# Patient Record
Sex: MALE | Race: WHITE | NOT HISPANIC OR LATINO | Employment: OTHER | URBAN - METROPOLITAN AREA
[De-identification: names, ages, dates, MRNs, and addresses within clinical notes are randomized per-mention and may not be internally consistent; named-entity substitution may affect disease eponyms.]

---

## 2017-01-03 ENCOUNTER — GENERIC CONVERSION - ENCOUNTER (OUTPATIENT)
Dept: OTHER | Facility: OTHER | Age: 67
End: 2017-01-03

## 2017-01-27 ENCOUNTER — ALLSCRIPTS OFFICE VISIT (OUTPATIENT)
Dept: OTHER | Facility: OTHER | Age: 67
End: 2017-01-27

## 2017-04-11 ENCOUNTER — GENERIC CONVERSION - ENCOUNTER (OUTPATIENT)
Dept: OTHER | Facility: OTHER | Age: 67
End: 2017-04-11

## 2017-05-19 ENCOUNTER — TRANSCRIBE ORDERS (OUTPATIENT)
Dept: ADMINISTRATIVE | Facility: HOSPITAL | Age: 67
End: 2017-05-19

## 2017-05-19 ENCOUNTER — APPOINTMENT (OUTPATIENT)
Dept: LAB | Facility: MEDICAL CENTER | Age: 67
End: 2017-05-19
Payer: COMMERCIAL

## 2017-05-19 DIAGNOSIS — R53.83 FATIGUE, UNSPECIFIED TYPE: ICD-10-CM

## 2017-05-19 DIAGNOSIS — M35.3 POLYMYALGIA RHEUMATICA (HCC): ICD-10-CM

## 2017-05-19 DIAGNOSIS — M35.3 POLYMYALGIA RHEUMATICA (HCC): Primary | ICD-10-CM

## 2017-05-19 DIAGNOSIS — E55.9 UNSPECIFIED VITAMIN D DEFICIENCY: ICD-10-CM

## 2017-05-19 LAB
25(OH)D3 SERPL-MCNC: 30.5 NG/ML (ref 30–100)
ALBUMIN SERPL BCP-MCNC: 3.8 G/DL (ref 3.5–5)
ALP SERPL-CCNC: 63 U/L (ref 46–116)
ALT SERPL W P-5'-P-CCNC: 21 U/L (ref 12–78)
ANION GAP SERPL CALCULATED.3IONS-SCNC: 6 MMOL/L (ref 4–13)
AST SERPL W P-5'-P-CCNC: 19 U/L (ref 5–45)
BASOPHILS # BLD AUTO: 0.04 THOUSANDS/ΜL (ref 0–0.1)
BASOPHILS NFR BLD AUTO: 1 % (ref 0–1)
BILIRUB SERPL-MCNC: 0.38 MG/DL (ref 0.2–1)
BUN SERPL-MCNC: 12 MG/DL (ref 5–25)
CALCIUM SERPL-MCNC: 9.1 MG/DL (ref 8.3–10.1)
CHLORIDE SERPL-SCNC: 104 MMOL/L (ref 100–108)
CO2 SERPL-SCNC: 26 MMOL/L (ref 21–32)
CREAT SERPL-MCNC: 0.77 MG/DL (ref 0.6–1.3)
CRP SERPL QL: 7.3 MG/L
EOSINOPHIL # BLD AUTO: 0.18 THOUSAND/ΜL (ref 0–0.61)
EOSINOPHIL NFR BLD AUTO: 3 % (ref 0–6)
ERYTHROCYTE [DISTWIDTH] IN BLOOD BY AUTOMATED COUNT: 14.2 % (ref 11.6–15.1)
ERYTHROCYTE [SEDIMENTATION RATE] IN BLOOD: 7 MM/HOUR (ref 0–10)
GFR SERPL CREATININE-BSD FRML MDRD: >60 ML/MIN/1.73SQ M
GLUCOSE P FAST SERPL-MCNC: 87 MG/DL (ref 65–99)
HCT VFR BLD AUTO: 43.4 % (ref 36.5–49.3)
HGB BLD-MCNC: 14.9 G/DL (ref 12–17)
LYMPHOCYTES # BLD AUTO: 1.29 THOUSANDS/ΜL (ref 0.6–4.47)
LYMPHOCYTES NFR BLD AUTO: 20 % (ref 14–44)
MCH RBC QN AUTO: 30.3 PG (ref 26.8–34.3)
MCHC RBC AUTO-ENTMCNC: 34.3 G/DL (ref 31.4–37.4)
MCV RBC AUTO: 88 FL (ref 82–98)
MONOCYTES # BLD AUTO: 0.85 THOUSAND/ΜL (ref 0.17–1.22)
MONOCYTES NFR BLD AUTO: 13 % (ref 4–12)
NEUTROPHILS # BLD AUTO: 4.21 THOUSANDS/ΜL (ref 1.85–7.62)
NEUTS SEG NFR BLD AUTO: 63 % (ref 43–75)
NRBC BLD AUTO-RTO: 0 /100 WBCS
PLATELET # BLD AUTO: 230 THOUSANDS/UL (ref 149–390)
PMV BLD AUTO: 11.9 FL (ref 8.9–12.7)
POTASSIUM SERPL-SCNC: 4.2 MMOL/L (ref 3.5–5.3)
PROT SERPL-MCNC: 7.2 G/DL (ref 6.4–8.2)
RBC # BLD AUTO: 4.92 MILLION/UL (ref 3.88–5.62)
SODIUM SERPL-SCNC: 136 MMOL/L (ref 136–145)
TSH SERPL DL<=0.05 MIU/L-ACNC: 1.25 UIU/ML (ref 0.36–3.74)
WBC # BLD AUTO: 6.58 THOUSAND/UL (ref 4.31–10.16)

## 2017-05-19 PROCEDURE — 85025 COMPLETE CBC W/AUTO DIFF WBC: CPT

## 2017-05-19 PROCEDURE — 80053 COMPREHEN METABOLIC PANEL: CPT

## 2017-05-19 PROCEDURE — 85652 RBC SED RATE AUTOMATED: CPT

## 2017-05-19 PROCEDURE — 86430 RHEUMATOID FACTOR TEST QUAL: CPT

## 2017-05-19 PROCEDURE — 86140 C-REACTIVE PROTEIN: CPT

## 2017-05-19 PROCEDURE — 84443 ASSAY THYROID STIM HORMONE: CPT

## 2017-05-19 PROCEDURE — 82306 VITAMIN D 25 HYDROXY: CPT

## 2017-05-19 PROCEDURE — 36415 COLL VENOUS BLD VENIPUNCTURE: CPT

## 2017-05-19 PROCEDURE — 86200 CCP ANTIBODY: CPT

## 2017-05-22 LAB
CCP IGA+IGG SERPL IA-ACNC: 6 UNITS (ref 0–19)
RHEUMATOID FACT SER QL LA: NEGATIVE

## 2017-06-30 ENCOUNTER — GENERIC CONVERSION - ENCOUNTER (OUTPATIENT)
Dept: OTHER | Facility: OTHER | Age: 67
End: 2017-06-30

## 2017-07-20 ENCOUNTER — TRANSCRIBE ORDERS (OUTPATIENT)
Dept: ADMINISTRATIVE | Facility: HOSPITAL | Age: 67
End: 2017-07-20

## 2017-07-20 ENCOUNTER — APPOINTMENT (OUTPATIENT)
Dept: LAB | Facility: MEDICAL CENTER | Age: 67
End: 2017-07-20
Payer: COMMERCIAL

## 2017-07-20 DIAGNOSIS — M35.3 POLYMYALGIA RHEUMATICA (HCC): ICD-10-CM

## 2017-07-20 DIAGNOSIS — M35.3 POLYMYALGIA RHEUMATICA (HCC): Primary | ICD-10-CM

## 2017-07-20 LAB
CRP SERPL QL: <3 MG/L
ERYTHROCYTE [SEDIMENTATION RATE] IN BLOOD: 1 MM/HOUR (ref 0–10)

## 2017-07-20 PROCEDURE — 85652 RBC SED RATE AUTOMATED: CPT

## 2017-07-20 PROCEDURE — 36415 COLL VENOUS BLD VENIPUNCTURE: CPT

## 2017-07-20 PROCEDURE — 86140 C-REACTIVE PROTEIN: CPT

## 2017-08-10 ENCOUNTER — ALLSCRIPTS OFFICE VISIT (OUTPATIENT)
Dept: OTHER | Facility: OTHER | Age: 67
End: 2017-08-10

## 2017-09-06 ENCOUNTER — GENERIC CONVERSION - ENCOUNTER (OUTPATIENT)
Dept: OTHER | Facility: OTHER | Age: 67
End: 2017-09-06

## 2017-09-27 ENCOUNTER — TRANSCRIBE ORDERS (OUTPATIENT)
Dept: ADMINISTRATIVE | Facility: HOSPITAL | Age: 67
End: 2017-09-27

## 2017-09-27 ENCOUNTER — APPOINTMENT (OUTPATIENT)
Dept: LAB | Facility: MEDICAL CENTER | Age: 67
End: 2017-09-27
Attending: UROLOGY
Payer: COMMERCIAL

## 2017-09-27 DIAGNOSIS — N40.1 ENLARGED PROSTATE WITH URINARY OBSTRUCTION: ICD-10-CM

## 2017-09-27 DIAGNOSIS — N13.8 ENLARGED PROSTATE WITH URINARY OBSTRUCTION: Primary | ICD-10-CM

## 2017-09-27 DIAGNOSIS — N40.1 ENLARGED PROSTATE WITH URINARY OBSTRUCTION: Primary | ICD-10-CM

## 2017-09-27 DIAGNOSIS — N13.8 ENLARGED PROSTATE WITH URINARY OBSTRUCTION: ICD-10-CM

## 2017-09-27 LAB — PSA SERPL-MCNC: 2.9 NG/ML (ref 0–4)

## 2017-09-27 PROCEDURE — 84153 ASSAY OF PSA TOTAL: CPT

## 2017-09-29 ENCOUNTER — GENERIC CONVERSION - ENCOUNTER (OUTPATIENT)
Dept: OTHER | Facility: OTHER | Age: 67
End: 2017-09-29

## 2017-10-12 ENCOUNTER — ALLSCRIPTS OFFICE VISIT (OUTPATIENT)
Dept: OTHER | Facility: OTHER | Age: 67
End: 2017-10-12

## 2017-10-12 DIAGNOSIS — N40.1 ENLARGED PROSTATE WITH LOWER URINARY TRACT SYMPTOMS (LUTS): ICD-10-CM

## 2017-10-12 LAB
BILIRUB UR QL STRIP: NORMAL
CLARITY UR: NORMAL
COLOR UR: YELLOW
GLUCOSE (HISTORICAL): NORMAL
HGB UR QL STRIP.AUTO: NORMAL
KETONES UR STRIP-MCNC: NORMAL MG/DL
LEUKOCYTE ESTERASE UR QL STRIP: NORMAL
NITRITE UR QL STRIP: NORMAL
PH UR STRIP.AUTO: 5.5 [PH]
PROT UR STRIP-MCNC: NORMAL MG/DL
SP GR UR STRIP.AUTO: 1
UROBILINOGEN UR QL STRIP.AUTO: 0.2

## 2018-01-10 NOTE — RESULT NOTES
Verified Results  (1) WOUND CULTURE [id number] 04:53PM Denae Junior    Order Number: [id number]     Test Name Result Flag Reference   CLINICAL REPORT (Report)     Test:        Wound culture  Specimen Type:    Wound  Specimen Date:   7/29/2016 4:53 PM  Result Date:    7/31/2016 12:00 PM  Result Status:   Final result  Resulting Lab:   [address] [address]            Tel: [phone]      CULTURE                                       ------------------                                   2+ Growth of Mixed Skin Jazmine      STAIN                                        ------------------                                   Rare Polys    No bacteria seen
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
## 2018-01-12 VITALS
BODY MASS INDEX: 21.86 KG/M2 | HEIGHT: 66 IN | WEIGHT: 136 LBS | DIASTOLIC BLOOD PRESSURE: 82 MMHG | SYSTOLIC BLOOD PRESSURE: 124 MMHG

## 2018-01-13 VITALS
HEART RATE: 64 BPM | RESPIRATION RATE: 18 BRPM | DIASTOLIC BLOOD PRESSURE: 80 MMHG | HEIGHT: 66 IN | WEIGHT: 135 LBS | BODY MASS INDEX: 21.69 KG/M2 | SYSTOLIC BLOOD PRESSURE: 120 MMHG

## 2018-01-13 NOTE — MISCELLANEOUS
Message   Recorded as Task   Date: 09/06/2017 01:12 PM, Created By: Regulo Lui   Task Name: Miscellaneous   Assigned To: Devang ERAZO,TEAM   Regarding Patient: Tatyana Blackwood, Status: Active   Comment:    Regulo Lui - 06 Sep 2017 1:12 PM     TASK CREATED  Caller: Self; [phone] (Home)  Pt has an appointment with Dr Swati Esquivel in October and is requesting a PSA lab order to be sent to his home address  Maral Collins - 06 Sep 2017 1:15 PM     TASK EDITED  MAILED  Active Problems    1  Acute bronchitis (466 0) (J20 9)   2  Arthralgia of multiple joints (719 49) (M25 50)   3  Bunion (727 1) (M21 619)   4  History of Chronic use of steroids (V58 65)   5  History of Closed Compression Fracture Of The Sacrum/Coccyx (805 6)   6  Colon cancer screening (V76 51) (Z12 11)   7  Degeneration of cervical intervertebral disc (722 4) (M50 90)   8  Dysphagia (787 20) (R13 10)   9  History of Emergency Tracheostomy   10  History of ENT Surgical Result - Throat Larynx Vocal Cord Mobility   11  Foreign body of right thumb (915 6) (S60 351A)   12  Fracture Of T11-T12 Vertebral Body (805 2)   13  History of pneumothorax (V12 69) (Z87 09)   14  History of polymyalgia rheumatica (V13 59) (Z87 39)   15  History of rheumatic fever (V12 09) (Z86 79)   16  Hoarseness (784 42) (R49 0)   17  Hyperlipidemia (272 4) (E78 5)   18  Insect bite of forearm (913 4,E906 4) (A75 721B,Y35  XXXA)   19  History of Nephrolithiasis (V13 01)   20  History of Subdural hematoma (432 1) (I62 00)    Current Meds   1  BL Vitamin E 400 UNIT CAPS; 1 DAILY Recorded   2  PredniSONE (Brian) 5 MG TABS; Therapy: (Recorded:10Aug2017) to Recorded   3  Vitamin C 500 MG Oral Tablet; Take 1 tablet daily Recorded   4  Vitamin D 1000 UNIT CAPS; 2 daily Recorded    Allergies    1   No Known Drug Allergies    Signatures   Electronically signed by : Sammy Vale RN; Sep  6 2017  1:15PM EST                       (Author)

## 2018-01-14 VITALS
BODY MASS INDEX: 23.5 KG/M2 | TEMPERATURE: 97 F | HEART RATE: 68 BPM | WEIGHT: 146.25 LBS | DIASTOLIC BLOOD PRESSURE: 72 MMHG | SYSTOLIC BLOOD PRESSURE: 130 MMHG | HEIGHT: 66 IN

## 2018-01-17 NOTE — RESULT NOTES
Verified Results  (1) URIC ACID 21Jun2016 08:09AM [address] [employer] Order Number: [id number]  TW Order Number: [id number] Order Number: [id number] Order Number: [id number]     Test Name Result Flag Reference   URIC ACID 4 8 mg/dL  4 2-8 0   Specimen collection should occur prior to Metamizole administration due to the potential for falsely depressed results  (1) COMPREHENSIVE METABOLIC PANEL 41HXG0431 55:85FK [address] [employer] Order Number: [id number]  TW Order Number: [id number] Order Number: [id number] Order Number: [id number]     Test Name Result Flag Reference   GLUCOSE,RANDM 91 mg/dL     If the patient is fasting, the ADA then defines impaired fasting glucose as > 100 mg/dL and diabetes as > or equal to 123 mg/dL  SODIUM 139 mmol/L  136-145   POTASSIUM 4 1 mmol/L  3 5-5 3   CHLORIDE 103 mmol/L  100-108   CARBON DIOXIDE 27 mmol/L  21-32   ANION GAP (CALC) 9 mmol/L  4-13   BLOOD UREA NITROGEN 19 mg/dL  5-25   CREATININE 1 00 mg/dL  0 60-1 30   Standardized to IDMS reference method   CALCIUM 9 0 mg/dL  8 3-10 1   BILI, TOTAL 0 61 mg/dL  0 20-1 00   ALK PHOSPHATAS 50 U/L     ALT (SGPT) 27 U/L  12-78   AST(SGOT) 28 U/L  5-45   ALBUMIN 4 0 g/dL  3 5-5 0   TOTAL PROTEIN 6 9 g/dL  6 4-8 2   eGFR Non-African American      >60 0 ml/min/1 73sq [facility] Disease Education Program recommendations are as follows:  GFR calculation is accurate only with a steady state creatinine  Chronic Kidney disease less than 60 ml/min/1 73 sq  meters  Kidney failure less than 15 ml/min/1 73 sq  meters       (1) LIPID PANEL, FASTING 21Jun2016 08:09AM [address] [employer] Order Number: [id number]  TW Order Number: [id number] Order Number: [id number] Order Number: [id number]     Test Name Result Flag Reference   CHOLESTEROL 212 mg/dL H    HDL,DIRECT 56 mg/dL  40-60   Specimen collection should occur prior to Metamizole administration due to the potential for falsely depressed results  LDL CHOLESTEROL CALCULATED 141 mg/dL H 0-100   Triglyceride:         Normal              <150 mg/dl       Borderline High    150-199 mg/dl       High               200-499 mg/dl       Very High          >499 mg/dl  Cholesterol:         Desirable        <200 mg/dl      Borderline High  200-239 mg/dl      High             >239 mg/dl  HDL Cholesterol:        High    >59 mg/dL      Low     <41 mg/dL  LDL CALCULATED:    This screening LDL is a calculated result  It does not have the accuracy of the Direct Measured LDL in the monitoring of patients with hyperlipidemia and/or statin therapy  Direct Measure LDL (BXX524) must be ordered separately in these patients  TRIGLYCERIDES 74 mg/dL  <=150   Specimen collection should occur prior to N-Acetylcysteine or Metamizole administration due to the potential for falsely depressed results

## 2018-01-17 NOTE — RESULT NOTES
Verified Results  * XR CHEST PA & LATERAL [id number] 11:10AM [facility] Order Number: [id number]   Performing Comments: past hx pneumothorax right w current bronchitis/ rhonchi right mid lung     Test Name Result Flag Reference   XR CHEST PA & LATERAL (Report)     CHEST - DUAL ENERGY     INDICATION: Cough and chest tightness  Paralyzed vocal cords  COMPARISON: None     VIEWS: PA (including soft tissue/bone algorithms) and lateral projections; 4 images     FINDINGS:        Cardiomediastinal silhouette appears unremarkable  The lungs are clear  No pneumothorax or pleural effusion  Visualized osseous structures demonstrate old healed right rib fractures  IMPRESSION:     No active pulmonary disease  Workstation performed: [id number]     Signed by:    Ciarra Gonzalez MD   1/3/17

## 2018-08-15 ENCOUNTER — OFFICE VISIT (OUTPATIENT)
Dept: FAMILY MEDICINE CLINIC | Facility: CLINIC | Age: 68
End: 2018-08-15
Payer: COMMERCIAL

## 2018-08-15 VITALS
RESPIRATION RATE: 18 BRPM | TEMPERATURE: 98.1 F | DIASTOLIC BLOOD PRESSURE: 80 MMHG | BODY MASS INDEX: 21.69 KG/M2 | SYSTOLIC BLOOD PRESSURE: 138 MMHG | OXYGEN SATURATION: 97 % | HEART RATE: 72 BPM | WEIGHT: 135 LBS | HEIGHT: 66 IN

## 2018-08-15 DIAGNOSIS — Z11.59 NEED FOR HEPATITIS C SCREENING TEST: ICD-10-CM

## 2018-08-15 DIAGNOSIS — R10.84 GENERALIZED ABDOMINAL PAIN: Primary | ICD-10-CM

## 2018-08-15 DIAGNOSIS — E78.2 MIXED HYPERLIPIDEMIA: ICD-10-CM

## 2018-08-15 DIAGNOSIS — R97.20 ELEVATED PSA: ICD-10-CM

## 2018-08-15 DIAGNOSIS — M35.3 PMR (POLYMYALGIA RHEUMATICA) (HCC): ICD-10-CM

## 2018-08-15 PROBLEM — R35.1 NOCTURIA: Status: ACTIVE | Noted: 2017-10-12

## 2018-08-15 PROBLEM — N13.8 BENIGN PROSTATIC HYPERPLASIA WITH URINARY OBSTRUCTION: Status: ACTIVE | Noted: 2017-09-06

## 2018-08-15 PROBLEM — M25.50 ARTHRALGIA OF MULTIPLE JOINTS: Status: ACTIVE | Noted: 2017-01-27

## 2018-08-15 PROBLEM — N40.1 BENIGN PROSTATIC HYPERPLASIA WITH URINARY OBSTRUCTION: Status: ACTIVE | Noted: 2017-09-06

## 2018-08-15 PROBLEM — J38.3 VOCAL CORD DYSFUNCTION: Status: ACTIVE | Noted: 2018-08-15

## 2018-08-15 PROCEDURE — 99214 OFFICE O/P EST MOD 30 MIN: CPT | Performed by: FAMILY MEDICINE

## 2018-08-15 PROCEDURE — 1101F PT FALLS ASSESS-DOCD LE1/YR: CPT | Performed by: FAMILY MEDICINE

## 2018-08-15 PROCEDURE — 3725F SCREEN DEPRESSION PERFORMED: CPT | Performed by: FAMILY MEDICINE

## 2018-08-15 PROCEDURE — 3008F BODY MASS INDEX DOCD: CPT | Performed by: FAMILY MEDICINE

## 2018-08-15 PROCEDURE — 1160F RVW MEDS BY RX/DR IN RCRD: CPT | Performed by: FAMILY MEDICINE

## 2018-08-15 RX ORDER — CHOLECALCIFEROL (VITAMIN D3) 25 MCG
CAPSULE ORAL DAILY
COMMUNITY

## 2018-08-15 RX ORDER — MULTIVIT-MIN/IRON/FOLIC ACID/K 18-600-40
1 CAPSULE ORAL DAILY
COMMUNITY

## 2018-08-15 RX ORDER — VITAMIN E 268 MG
CAPSULE ORAL DAILY
COMMUNITY

## 2018-08-15 NOTE — PROGRESS NOTES
Assessment/Plan:    Elevated PSA  Follow-up with Urology    Mixed hyperlipidemia  Check a fasting lipid    PMR (polymyalgia rheumatica) (HCC)  Clinically stable  Continue to monitor clinically  Diagnoses and all orders for this visit:    Generalized abdominal pain  -     CT abdomen pelvis w contrast; Future  -     CBC and differential; Future  -     Comprehensive metabolic panel; Future  -     Amylase; Future    Need for hepatitis C screening test  -     Hepatitis C antibody; Future    Mixed hyperlipidemia  -     Lipid Panel with Direct LDL reflex; Future    PMR (polymyalgia rheumatica) (HCC)  -     CBC and differential; Future    Elevated PSA    Other orders  -     Cholecalciferol (VITAMIN D-3) 1000 units CAPS; Take by mouth daily  -     Ascorbic Acid (VITAMIN C) 500 MG CAPS; Take 1 tablet by mouth daily  -     vitamin E, tocopherol, (vitamin E) 400 units capsule; Take by mouth daily          Subjective:      Patient ID: Gaby Hernandez is a 69 y o  male  HPI  Patient presents today for complaint of a 6 month history of abdominal pain  This has been intermittent  It seems to be worse when he is moving positions and engaging his core  He remains extremely active with all types of physical activities and has been doing that without any type of chest pain, shortness of breath, palpitations or lightheadedness  He has had no change in bowel habits such as constipation or diarrhea  He has had no melena or bright red blood per rectum  He has had some weight loss recently which may be more than he anticipated  He admits to having a very good diet and exercising routinely so this was not very concerning to him  He is extremely concerned about pancreatic cancer as a friend of his just passed away from this  He did have an ultrasound of his right upper quadrant done by his dermatologist yesterday which was normal   He has history of hyperlipidemia but denies chest pain, shortness of breath or palpitations  He has a history of PMR and currently denies any diffuse myalgias  He has seen Rheumatology in the past  The following portions of the patient's history were reviewed and updated as appropriate: allergies, current medications, past family history, past medical history, past social history, past surgical history and problem list     Review of Systems   Constitutional: Positive for unexpected weight change  Negative for appetite change, chills, fatigue and fever  HENT: Negative for trouble swallowing  Eyes: Negative for visual disturbance  Respiratory: Negative for cough, chest tightness, shortness of breath and wheezing  Cardiovascular: Negative for chest pain  Gastrointestinal: Positive for abdominal pain  Negative for abdominal distention, blood in stool, constipation, diarrhea, nausea and rectal pain  Endocrine: Negative for polyuria  Genitourinary: Negative for difficulty urinating and flank pain  Musculoskeletal: Negative for arthralgias and myalgias  Skin: Negative for rash  Neurological: Negative for dizziness and light-headedness  Hematological: Negative for adenopathy  Does not bruise/bleed easily  Psychiatric/Behavioral: Negative for sleep disturbance  Objective:      /80   Pulse 72   Temp 98 1 °F (36 7 °C)   Resp 18   Ht 5' 6" (1 676 m)   Wt 61 2 kg (135 lb)   SpO2 97%   BMI 21 79 kg/m²          Physical Exam   Constitutional: He is oriented to person, place, and time  He appears well-developed and well-nourished  No distress  HENT:   Head: Normocephalic and atraumatic  Right Ear: External ear normal    Left Ear: External ear normal    Mouth/Throat: Oropharynx is clear and moist  No oropharyngeal exudate  Eyes: EOM are normal  Pupils are equal, round, and reactive to light  Right eye exhibits no discharge  Left eye exhibits no discharge  No scleral icterus  Neck: Normal carotid pulses present  Carotid bruit is not present   No tracheal deviation, no edema and no erythema present  No thyromegaly present  Cardiovascular: Normal rate, regular rhythm and normal heart sounds  Exam reveals no gallop  No murmur heard  Pulmonary/Chest: Effort normal  No stridor  No tachypnea  No respiratory distress  He has no wheezes  He has no rales  Abdominal: Soft  Bowel sounds are normal  He exhibits no distension and no mass  There is no hepatosplenomegaly  There is tenderness (He has some pain to palpation in the right upper quadrant and epigastrium  )  There is no rebound, no guarding and no CVA tenderness  Musculoskeletal: Normal range of motion  He exhibits no edema, tenderness or deformity  Lymphadenopathy:     He has no cervical adenopathy  Neurological: He is alert and oriented to person, place, and time  He displays normal reflexes  No cranial nerve deficit  He exhibits normal muscle tone  Coordination normal    Skin: Skin is warm  No rash noted  He is not diaphoretic  No erythema  No pallor  Psychiatric: His speech is normal and behavior is normal  Judgment and thought content normal  His mood appears not anxious  Cognition and memory are normal  He does not exhibit a depressed mood

## 2018-08-16 ENCOUNTER — APPOINTMENT (OUTPATIENT)
Dept: LAB | Facility: CLINIC | Age: 68
End: 2018-08-16
Payer: COMMERCIAL

## 2018-08-16 DIAGNOSIS — M35.3 PMR (POLYMYALGIA RHEUMATICA) (HCC): ICD-10-CM

## 2018-08-16 DIAGNOSIS — E78.2 MIXED HYPERLIPIDEMIA: ICD-10-CM

## 2018-08-16 DIAGNOSIS — Z11.59 NEED FOR HEPATITIS C SCREENING TEST: ICD-10-CM

## 2018-08-16 DIAGNOSIS — R10.84 GENERALIZED ABDOMINAL PAIN: ICD-10-CM

## 2018-08-16 LAB
ALBUMIN SERPL BCP-MCNC: 4 G/DL (ref 3.5–5)
ALP SERPL-CCNC: 50 U/L (ref 46–116)
ALT SERPL W P-5'-P-CCNC: 35 U/L (ref 12–78)
AMYLASE SERPL-CCNC: 84 IU/L (ref 25–115)
ANION GAP SERPL CALCULATED.3IONS-SCNC: 4 MMOL/L (ref 4–13)
AST SERPL W P-5'-P-CCNC: 20 U/L (ref 5–45)
BASOPHILS # BLD AUTO: 0.04 THOUSANDS/ΜL (ref 0–0.1)
BASOPHILS NFR BLD AUTO: 1 % (ref 0–1)
BILIRUB SERPL-MCNC: 0.58 MG/DL (ref 0.2–1)
BUN SERPL-MCNC: 16 MG/DL (ref 5–25)
CALCIUM SERPL-MCNC: 9 MG/DL (ref 8.3–10.1)
CHLORIDE SERPL-SCNC: 105 MMOL/L (ref 100–108)
CHOLEST SERPL-MCNC: 201 MG/DL (ref 50–200)
CO2 SERPL-SCNC: 28 MMOL/L (ref 21–32)
CREAT SERPL-MCNC: 0.78 MG/DL (ref 0.6–1.3)
EOSINOPHIL # BLD AUTO: 0.08 THOUSAND/ΜL (ref 0–0.61)
EOSINOPHIL NFR BLD AUTO: 2 % (ref 0–6)
ERYTHROCYTE [DISTWIDTH] IN BLOOD BY AUTOMATED COUNT: 14.8 % (ref 11.6–15.1)
GFR SERPL CREATININE-BSD FRML MDRD: 92 ML/MIN/1.73SQ M
GLUCOSE P FAST SERPL-MCNC: 89 MG/DL (ref 65–99)
HCT VFR BLD AUTO: 44.5 % (ref 36.5–49.3)
HDLC SERPL-MCNC: 60 MG/DL (ref 40–60)
HGB BLD-MCNC: 14.5 G/DL (ref 12–17)
IMM GRANULOCYTES # BLD AUTO: 0.03 THOUSAND/UL (ref 0–0.2)
IMM GRANULOCYTES NFR BLD AUTO: 1 % (ref 0–2)
LDLC SERPL CALC-MCNC: 129 MG/DL (ref 0–100)
LYMPHOCYTES # BLD AUTO: 1.22 THOUSANDS/ΜL (ref 0.6–4.47)
LYMPHOCYTES NFR BLD AUTO: 24 % (ref 14–44)
MCH RBC QN AUTO: 29.2 PG (ref 26.8–34.3)
MCHC RBC AUTO-ENTMCNC: 32.6 G/DL (ref 31.4–37.4)
MCV RBC AUTO: 90 FL (ref 82–98)
MONOCYTES # BLD AUTO: 0.56 THOUSAND/ΜL (ref 0.17–1.22)
MONOCYTES NFR BLD AUTO: 11 % (ref 4–12)
NEUTROPHILS # BLD AUTO: 3.14 THOUSANDS/ΜL (ref 1.85–7.62)
NEUTS SEG NFR BLD AUTO: 61 % (ref 43–75)
NRBC BLD AUTO-RTO: 0 /100 WBCS
PLATELET # BLD AUTO: 218 THOUSANDS/UL (ref 149–390)
PMV BLD AUTO: 11.9 FL (ref 8.9–12.7)
POTASSIUM SERPL-SCNC: 4.1 MMOL/L (ref 3.5–5.3)
PROT SERPL-MCNC: 7.2 G/DL (ref 6.4–8.2)
RBC # BLD AUTO: 4.97 MILLION/UL (ref 3.88–5.62)
SODIUM SERPL-SCNC: 137 MMOL/L (ref 136–145)
TRIGL SERPL-MCNC: 58 MG/DL
WBC # BLD AUTO: 5.07 THOUSAND/UL (ref 4.31–10.16)

## 2018-08-16 PROCEDURE — 80053 COMPREHEN METABOLIC PANEL: CPT

## 2018-08-16 PROCEDURE — 85025 COMPLETE CBC W/AUTO DIFF WBC: CPT

## 2018-08-16 PROCEDURE — 80061 LIPID PANEL: CPT

## 2018-08-16 PROCEDURE — 36415 COLL VENOUS BLD VENIPUNCTURE: CPT

## 2018-08-16 PROCEDURE — 82150 ASSAY OF AMYLASE: CPT

## 2018-08-16 PROCEDURE — 86803 HEPATITIS C AB TEST: CPT

## 2018-08-17 LAB — HCV AB SER QL: NORMAL

## 2018-08-29 ENCOUNTER — HOSPITAL ENCOUNTER (OUTPATIENT)
Dept: CT IMAGING | Facility: HOSPITAL | Age: 68
Discharge: HOME | End: 2018-08-29
Payer: COMMERCIAL

## 2018-08-29 DIAGNOSIS — R10.84 GENERALIZED ABDOMINAL PAIN: ICD-10-CM

## 2018-08-29 PROCEDURE — 74177 CT ABD & PELVIS W/CONTRAST: CPT

## 2018-08-29 RX ADMIN — IOHEXOL 100 ML: 350 INJECTION, SOLUTION INTRAVENOUS at 19:59

## 2018-09-05 DIAGNOSIS — R10.9 ABDOMINAL PAIN, UNSPECIFIED ABDOMINAL LOCATION: Primary | ICD-10-CM

## 2018-09-15 PROBLEM — M25.519 SHOULDER JOINT PAIN: Status: ACTIVE | Noted: 2018-09-15

## 2018-09-15 PROBLEM — R79.89 ABNORMAL C-REACTIVE PROTEIN: Status: ACTIVE | Noted: 2018-09-15

## 2018-09-15 PROBLEM — M25.559 HIP PAIN: Status: ACTIVE | Noted: 2018-09-15

## 2018-09-15 PROBLEM — M65.30 ACQUIRED TRIGGER FINGER: Status: ACTIVE | Noted: 2018-09-15

## 2018-09-15 PROBLEM — M17.10 OSTEOARTHRITIS OF KNEE: Status: ACTIVE | Noted: 2018-09-15

## 2018-09-15 PROBLEM — M21.619 BUNION: Status: ACTIVE | Noted: 2018-09-15

## 2018-09-15 PROBLEM — R70.0 ESR RAISED: Status: ACTIVE | Noted: 2018-09-15

## 2018-09-15 PROBLEM — M22.40 CHONDROMALACIA PATELLAE: Status: ACTIVE | Noted: 2018-09-15

## 2018-09-15 PROBLEM — M17.9 OSTEOARTHRITIS OF KNEE: Status: ACTIVE | Noted: 2018-09-15

## 2018-09-15 PROBLEM — R26.2 DISABILITY OF WALKING: Status: ACTIVE | Noted: 2018-09-15

## 2018-09-15 PROBLEM — M11.9: Status: ACTIVE | Noted: 2018-09-15

## 2018-09-15 NOTE — PROGRESS NOTES
[place] [facility] [place] [facility]  FAMILY PRACTICE OFFICE VISIT       NAME: Ivis Vizcaino  AGE: 77 y o  SEX: male       : 1950        MRN: [id number]    DATE: 2018  TIME: 10:08 AM    Assessment and Plan     Problem List Items Addressed This Visit     Benign prostatic hyperplasia with urinary obstruction       He will continue to follow with Urology  He reports he will be getting labs done prior to his appointment next month  Mixed hyperlipidemia       We reviewed that his calculated ASCVD risk today is 13 5% based on last month's labs  He does not wish to take any medication for his cholesterol  He was encouraged to limit his intake of fatty food including red meat, cheese, fried food, and butter  We will continue to monitor  PMR (polymyalgia rheumatica) ([facility])       No recent issues per patient - will continue to monitor  He also continue to follow with Dr Chetna Coleman  Other Visit Diagnoses     Medicare annual wellness visit, subsequent    -  Primary    Flu vaccine need        Relevant Orders    influenza vaccine, [id number], high-dose, PF 0 5 mL, for patients 65 yr+ (FLUZONE HIGH-DOSE) (Completed)    Need for pneumococcal vaccination        Relevant Orders    PNEUMOCOCCAL POLYSACCHARIDE VACCINE 23-VALENT =>3YO SQ IM (Completed)    Need for shingles vaccine        Relevant Medications    Zoster Vac Recomb Adjuvanted ([address]) 50 MCG SUSR    Need for Tdap vaccination        Relevant Medications    tetanus-diphtheria-acellular pertussis (BOOSTRIX) injection          PMR (polymyalgia rheumatica) ([facility] [address] )    No recent issues per patient - will continue to monitor  He also continue to follow with Dr Chetna Coleman  Mixed hyperlipidemia    We reviewed that his calculated ASCVD risk today is 13 5% based on last month's labs  He does not wish to take any medication for his cholesterol    He was encouraged to limit his intake of fatty food including red meat, cheese, fried food, and butter  We will continue to monitor  Benign prostatic hyperplasia with urinary obstruction    He will continue to follow with Urology  He reports he will be getting labs done prior to his appointment next month  Chief Complaint     Chief Complaint   Patient presents with    Medicare Wellness Visit       History of Present Illness   Michael Dodge is a 77y o -year-old male who presents for AWV  Seen last month by Dr Tj Ortiz and had labs ordered for screening - okay besides mild elevation of cholesterol with LDL of 129 - ASCVD risk is 13 5% though - he would prefer not to be     Has BPH but last PSA was a year ago and was 2 9 - will be seeing Urology next month and will be repeating the PSA    PMR has been okay - denies any recent flares    Seen about 1 month ago for abdominal pain by Dr Tj Ortiz - concerned about pancreatic cancer due to a friend passing from this - was sent for CT and was okay - notes that the pain has improved - notes that it had been going on for months but no noted trigger - notes that he had also a normal US checking for AAA     Has ongoing dysphagia - notes that he had esophagram 2 years ago that was okay - notes that he has learned to live with it to an extent           Review of Systems   Review of Systems   Constitutional: Negative for chills and fever  HENT: Negative for rhinorrhea and sore throat  Eyes: Negative for visual disturbance  Respiratory: Negative for cough, shortness of breath and wheezing  Cardiovascular: Negative for chest pain, palpitations and leg swelling  Gastrointestinal: Negative for abdominal pain, constipation, diarrhea, nausea and vomiting  Endocrine: Negative for polydipsia and polyuria  Genitourinary: Negative for dysuria  Musculoskeletal: Negative for arthralgias, myalgias and neck pain (but cracking sounds)  Skin: Negative for rash  Neurological: Negative for dizziness, syncope and headaches     Hematological: Does not bruise/bleed easily  Psychiatric/Behavioral: Negative for dysphoric mood  The patient is not nervous/anxious          Active Problem List     Patient Active Problem List   Diagnosis    Arthralgia of multiple joints    Benign prostatic hyperplasia with urinary obstruction    DDD (degenerative disc disease), cervical    Elevated PSA    Closed fracture of dorsal (thoracic) vertebra (HCC)    Mixed hyperlipidemia    Nocturia    Subarachnoid hemorrhage (HCC)    Vocal cord dysfunction    PM[facility]a) ([facility])    Acquired trigger finger    Bunion    Chondromalacia patellae    Abnormal C-reactive protein    Crystal arthropathy of ankle and foot    ESR raised    Hip pain    Osteoarthritis of knee    Shoulder joint pain    Disability of walking    Kidney stone    Shortness of breath    Vocal cord paralysis         Past Medical History:  Past Medical History:   Diagnosis Date    Abscess of right thigh     Last Assessed: 7/29/2016    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    Closed compression fracture of sacrum ([facility])     Contusion of lung     Current chronic use of steroids     off 9/14    Inguinal hernia     Last Assessed: 11/7/2014    Nephrolithiasis     Pneumothorax, right     Last Assessed: 12/30/2016    Polymyalgia rheumatica ([facility] [address] ) 2013    Rheumatic fever     on Pcn x yrs    Rib fracture     Subdural hematoma ([address] ) 02/2010       Past Surgical History:  Past Surgical History:   Procedure Laterality Date    COLONOSCOPY      Complete: Melodie Douglas    COLONOSCOPY  2010    ENDOTRACHEAL INTUBATION EMERGENT      INGUINAL HERNIA REPAIR Right     THROAT SURGERY      throat Larynx Vocal Cord Mobility - twice    TRACHEOSTOMY      Emergency       Family History:  Family History   Problem Relation Age of Onset    Diabetes Mother     Lung cancer Mother     Nephrolithiasis Father     Stomach cancer Maternal Grandmother        Social History:  Social History     Social History    Marital status: Single     Spouse name: N/A    Number of children: N/A    Years of education: N/A     Occupational History    Not on file  Social History Main Topics    Smoking status: Never Smoker    Smokeless tobacco: Never Used    Alcohol use Yes      Comment: social-a few beers per week    Drug use: No    Sexual activity: Not on file     Other Topics Concern    Not on file     Social History Narrative    Caffeine use     per [employer]    Exercises regularly           Objective     Vitals:    09/20/18 0903   BP: 112/80   BP Location: Left arm   Patient Position: Sitting   Cuff Size: Standard   Pulse: 62   Temp: 97 5 °F (36 4 °C)   SpO2: 98%   Weight: 61 8 kg (136 lb 4 oz)   Height: 5' 5 8" (1 671 m)     Wt Readings from Last 3 Encounters:   09/20/18 61 8 kg (136 lb 4 oz)   08/15/18 61 2 kg (135 lb)   10/12/17 61 7 kg (136 lb)       Physical Exam   Constitutional: He appears well-developed and well-nourished  HENT:   Head: Normocephalic and atraumatic  Right Ear: Hearing, tympanic membrane, external ear and ear canal normal    Left Ear: Hearing, tympanic membrane, external ear and ear canal normal    Nose: Nose normal    Mouth/Throat: Uvula is midline, oropharynx is clear and moist and mucous membranes are normal    PND noted, hoarse voice   Eyes: Conjunctivae and lids are normal  Pupils are equal, round, and reactive to light  Neck: Trachea normal and normal range of motion  Neck supple  Carotid bruit is not present  No thyroid mass and no thyromegaly present  Cardiovascular: Normal rate, regular rhythm, S1 normal, S2 normal, normal heart sounds, intact distal pulses and normal pulses  No murmur heard  Pulses:       Radial pulses are 2+ on the right side, and 2+ on the left side  Posterior tibial pulses are 2+ on the right side, and 2+ on the left side     Pulmonary/Chest: Effort normal and breath sounds normal  He has no decreased breath sounds  He has no wheezes  He has no rhonchi  He has no rales  Abdominal: Soft  Normal appearance and bowel sounds are normal  He exhibits no distension  There is no splenomegaly or hepatomegaly  There is no tenderness  No hernia  Musculoskeletal: He exhibits no edema  Lymphadenopathy:     He has no cervical adenopathy  Neurological: He is alert  He has normal strength  No sensory deficit  Skin: Skin is warm, dry and intact  No rash noted  Psychiatric: He has a normal mood and affect   His speech is normal and behavior is normal        Pertinent Laboratory/Diagnostic Studies:  Results for orders placed or performed in visit on 08/16/18   Hepatitis C antibody   Result Value Ref Range    Hepatitis C Ab Non-reactive Non-reactive   CBC and differential   Result Value Ref Range    WBC 5 07 4 31 - 10 16 Thousand/uL    RBC 4 97 3 88 - 5 62 Million/uL    Hemoglobin 14 5 12 0 - 17 0 g/dL    Hematocrit 44 5 36 5 - 49 3 %    MCV 90 82 - 98 fL    MCH 29 2 26 8 - 34 3 pg    MCHC 32 6 31 4 - 37 4 g/dL    RDW 14 8 11 6 - 15 1 %    MPV 11 9 8 9 - 12 7 fL    Platelets 256 788 - 516 Thousands/uL    nRBC 0 /100 WBCs    Neutrophils Relative 61 43 - 75 %    Immat GRANS % 1 0 - 2 %    Lymphocytes Relative 24 14 - 44 %    Monocytes Relative 11 4 - 12 %    Eosinophils Relative 2 0 - 6 %    Basophils Relative 1 0 - 1 %    Neutrophils Absolute 3 14 1 85 - 7 62 Thousands/µL    Immature Grans Absolute 0 03 0 00 - 0 20 Thousand/uL    Lymphocytes Absolute 1 22 0 60 - 4 47 Thousands/µL    Monocytes Absolute 0 56 0 17 - 1 22 Thousand/µL    Eosinophils Absolute 0 08 0 00 - 0 61 Thousand/µL    Basophils Absolute 0 04 0 00 - 0 10 Thousands/µL   Comprehensive metabolic panel   Result Value Ref Range    Sodium 137 136 - 145 mmol/L    Potassium 4 1 3 5 - 5 3 mmol/L    Chloride 105 100 - 108 mmol/L    CO2 28 21 - 32 mmol/L    ANION GAP 4 4 - 13 mmol/L    BUN 16 5 - 25 mg/dL    Creatinine 0 78 0 60 - 1 30 mg/dL    Glucose, Fasting 89 65 - 99 mg/dL    Calcium 9 0 8 3 - 10 1 mg/dL    AST 20 5 - 45 U/L    ALT 35 12 - 78 U/L    Alkaline Phosphatase 50 46 - 116 U/L    Total Protein 7 2 6 4 - 8 2 g/dL    Albumin 4 0 3 5 - 5 0 g/dL    Total Bilirubin 0 58 0 20 - 1 00 mg/dL    eGFR 92 ml/min/1 73sq m   Lipid Panel with Direct LDL reflex   Result Value Ref Range    Cholesterol 201 (H) 50 - 200 mg/dL    Triglycerides 58 <=150 mg/dL    HDL, Direct 60 40 - 60 mg/dL    LDL Calculated 129 (H) 0 - 100 mg/dL   Amylase   Result Value Ref Range    Amylase 84 25 - 115 IU/L       Orders Placed This Encounter   Procedures    influenza vaccine, [id number], high-dose, PF 0 5 mL, for patients 65 yr+ (FLUZONE HIGH-DOSE)    PNEUMOCOCCAL POLYSACCHARIDE VACCINE 23-VALENT =>3YO SQ IM       ALLERGIES:  No Known Allergies    Current Medications     Current Outpatient Prescriptions   Medication Sig Dispense Refill    Ascorbic Acid (VITAMIN C) 500 MG CAPS Take 1 tablet by mouth daily      Calcium-Magnesium-Vitamin D (CALCIUM 500 PO) 1000iu      Cholecalciferol (VITAMIN D-3) 1000 units CAPS Take by mouth daily      Cranberry 1000 MG CAPS Take 1 capsule by mouth daily      vitamin E, tocopherol, (vitamin E) 400 units capsule Take by mouth daily      tetanus-diphtheria-acellular pertussis (BOOSTRIX) injection Inject 0 5 mL into a muscle once for 1 dose 0 5 mL 0    Zoster Vac Recomb Adjuvanted (SHINGRIX) 50 MCG SUSR Inject 50 mcg into a muscle once for 1 dose 1 each 0     No current facility-administered medications for this visit            Health Maintenance     Health Maintenance   Topic Date Due    Medicare Annual Wellness Visit (AWV)  1950    DXA SCAN  1950    CRC Screening: Colonoscopy  1950    DTaP,Tdap,and Td Vaccines (1 - Tdap) 09/10/1969    Pneumococcal PPSV23/PCV13 65+ Years / Low and Medium Risk (2 of 2 - PPSV23) 01/16/2018    INFLUENZA VACCINE  09/01/2018    Fall Risk  09/20/2019    Depression Screening PHQ  09/20/2019     Immunization History Administered Date(s) Administered    Influenza, high dose seasonal 0 5 mL 09/20/2018    Pneumococcal Conjugate 13-Valent 01/16/2017    Pneumococcal Polysaccharide PPV23 09/20/2018    Zoster 06/09/2005       Qiana Lau PA-C  9/20/2018 10:08 AM  [facility]

## 2018-09-17 RX ORDER — AZELASTINE HCL 205.5 UG/1
SPRAY NASAL EVERY 12 HOURS
COMMUNITY
End: 2018-09-20

## 2018-09-17 RX ORDER — POLYETHYLENE GLYCOL 3350, SODIUM CHLORIDE, SODIUM BICARBONATE, POTASSIUM CHLORIDE 420; 11.2; 5.72; 1.48 G/4L; G/4L; G/4L; G/4L
POWDER, FOR SOLUTION ORAL
COMMUNITY
End: 2018-09-20

## 2018-09-17 RX ORDER — PREDNISONE 1 MG/1
TABLET ORAL
COMMUNITY
End: 2018-09-20

## 2018-09-17 RX ORDER — PREDNISONE 10 MG/1
TABLET ORAL
COMMUNITY
End: 2018-09-20

## 2018-09-17 RX ORDER — GUAIFENESIN AND CODEINE PHOSPHATE 100; 10 MG/5ML; MG/5ML
SOLUTION ORAL
COMMUNITY
End: 2018-09-20

## 2018-09-17 RX ORDER — HYDROCODONE BITARTRATE AND ACETAMINOPHEN 5; 325 MG/1; MG/1
TABLET ORAL
COMMUNITY
End: 2018-09-20

## 2018-09-17 RX ORDER — FLUTICASONE PROPIONATE 50 MCG
SPRAY, SUSPENSION (ML) NASAL DAILY
COMMUNITY
End: 2018-09-20

## 2018-09-17 RX ORDER — DOXYCYCLINE HYCLATE 100 MG/1
CAPSULE ORAL
COMMUNITY
End: 2018-09-20

## 2018-09-17 RX ORDER — COVID-19 ANTIGEN TEST
KIT MISCELLANEOUS
COMMUNITY
End: 2018-09-20

## 2018-09-20 ENCOUNTER — OFFICE VISIT (OUTPATIENT)
Dept: FAMILY MEDICINE CLINIC | Facility: CLINIC | Age: 68
End: 2018-09-20
Payer: COMMERCIAL

## 2018-09-20 VITALS
TEMPERATURE: 97.5 F | SYSTOLIC BLOOD PRESSURE: 112 MMHG | DIASTOLIC BLOOD PRESSURE: 80 MMHG | WEIGHT: 136.25 LBS | OXYGEN SATURATION: 98 % | BODY MASS INDEX: 21.9 KG/M2 | HEIGHT: 66 IN | HEART RATE: 62 BPM

## 2018-09-20 DIAGNOSIS — N13.8 BENIGN PROSTATIC HYPERPLASIA WITH URINARY OBSTRUCTION: ICD-10-CM

## 2018-09-20 DIAGNOSIS — N40.1 BENIGN PROSTATIC HYPERPLASIA WITH URINARY OBSTRUCTION: ICD-10-CM

## 2018-09-20 DIAGNOSIS — Z23 NEED FOR SHINGLES VACCINE: ICD-10-CM

## 2018-09-20 DIAGNOSIS — M35.3 PMR (POLYMYALGIA RHEUMATICA) (HCC): ICD-10-CM

## 2018-09-20 DIAGNOSIS — Z00.00 MEDICARE ANNUAL WELLNESS VISIT, SUBSEQUENT: Primary | ICD-10-CM

## 2018-09-20 DIAGNOSIS — Z23 FLU VACCINE NEED: ICD-10-CM

## 2018-09-20 DIAGNOSIS — E78.2 MIXED HYPERLIPIDEMIA: ICD-10-CM

## 2018-09-20 DIAGNOSIS — Z23 NEED FOR PNEUMOCOCCAL VACCINATION: ICD-10-CM

## 2018-09-20 DIAGNOSIS — Z23 NEED FOR TDAP VACCINATION: ICD-10-CM

## 2018-09-20 PROCEDURE — 90662 IIV NO PRSV INCREASED AG IM: CPT

## 2018-09-20 PROCEDURE — 90732 PPSV23 VACC 2 YRS+ SUBQ/IM: CPT

## 2018-09-20 PROCEDURE — G0008 ADMIN INFLUENZA VIRUS VAC: HCPCS

## 2018-09-20 PROCEDURE — 1170F FXNL STATUS ASSESSED: CPT | Performed by: PHYSICIAN ASSISTANT

## 2018-09-20 PROCEDURE — 1125F AMNT PAIN NOTED PAIN PRSNT: CPT | Performed by: PHYSICIAN ASSISTANT

## 2018-09-20 PROCEDURE — 1101F PT FALLS ASSESS-DOCD LE1/YR: CPT | Performed by: PHYSICIAN ASSISTANT

## 2018-09-20 PROCEDURE — G0009 ADMIN PNEUMOCOCCAL VACCINE: HCPCS

## 2018-09-20 PROCEDURE — 99213 OFFICE O/P EST LOW 20 MIN: CPT | Performed by: PHYSICIAN ASSISTANT

## 2018-09-20 PROCEDURE — G0439 PPPS, SUBSEQ VISIT: HCPCS | Performed by: PHYSICIAN ASSISTANT

## 2018-09-20 NOTE — PROGRESS NOTES
Assessment and Plan:    Problem List Items Addressed This Visit     Benign prostatic hyperplasia with urinary obstruction       He will continue to follow with Urology  He reports he will be getting labs done prior to his appointment next month  Mixed hyperlipidemia       We reviewed that his calculated ASCVD risk today is 13 5% based on last month's labs  He does not wish to take any medication for his cholesterol  He was encouraged to limit his intake of fatty food including red meat, cheese, fried food, and butter  We will continue to monitor  PMR (polymyalgia rheumatica) ([facility])       No recent issues per patient - will continue to monitor  He also continue to follow with Dr Saira Ruffin  Other Visit Diagnoses     Medicare annual wellness visit, subsequent    -  Primary    Flu vaccine need        Relevant Orders    influenza vaccine, [id number], high-dose, PF 0 5 mL, for patients 65 yr+ (FLUZONE HIGH-DOSE) (Completed)    Need for pneumococcal vaccination        Relevant Orders    PNEUMOCOCCAL POLYSACCHARIDE VACCINE 23-VALENT =>3YO SQ IM (Completed)    Need for shingles vaccine        Relevant Medications    Zoster Vac Recomb Adjuvanted ([address]) 50 MCG SUSR    Need for Tdap vaccination        Relevant Medications    tetanus-diphtheria-acellular pertussis ([address]) injection        Health Maintenance Due   Topic Date Due    Medicare Annual Wellness Visit (AWV)  1950    DXA SCAN  1950    CRC Screening: Colonoscopy  1950    DTaP,Tdap,and Td Vaccines (1 - Tdap) 09/10/1969    Pneumococcal PPSV23/PCV13 65+ Years / Low and Medium Risk (2 of 2 - PPSV23) 01/16/2018    INFLUENZA VACCINE  09/01/2018         HPI:  Gaby Hernandez is a 77 y o  male here for his Subsequent Wellness Visit      Patient Active Problem List   Diagnosis    Arthralgia of multiple joints    Benign prostatic hyperplasia with urinary obstruction    DDD (degenerative disc disease), cervical    Elevated PSA    Closed fracture of dorsal (thoracic) vertebra (HCC)    Mixed hyperlipidemia    Nocturia    Subarachnoid hemorrhage (HCC)    Vocal cord dysfunction    PMR (polymyalgia rheumatica) (HCC)    Acquired trigger finger    Bunion    Chondromalacia patellae    Abnormal C-reactive protein    Crystal arthropathy of ankle and foot    ESR raised    Hip pain    Osteoarthritis of knee    Shoulder joint pain    Disability of walking    Kidney stone    Shortness of breath    Vocal cord paralysis     Past Medical History:   Diagnosis Date    Abscess of right thigh     Last Assessed: 7/29/2016    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    Closed compression fracture of sacrum (HCC)     Contusion of lung     Current chronic use of steroids     off 9/14    Inguinal hernia     Last Assessed: 11/7/2014    Nephrolithiasis     Pneumothorax, right     Last Assessed: 12/30/2016    Polymyalgia rheumatica ([facility] [address] ) 2013    Rheumatic fever     on Pcn x yrs    Rib fracture     Subdural hematoma ([facility] [address] ) 02/2010     Past Surgical History:   Procedure Laterality Date    COLONOSCOPY      Complete: Elissa Kidd    COLONOSCOPY  2010    ENDOTRACHEAL INTUBATION EMERGENT      INGUINAL HERNIA REPAIR Right     THROAT SURGERY      throat Larynx Vocal Cord Mobility - twice    TRACHEOSTOMY      Emergency     Family History   Problem Relation Age of Onset    Diabetes Mother     Lung cancer Mother     Nephrolithiasis Father     Stomach cancer Maternal Grandmother      History   Smoking Status    Never Smoker   Smokeless Tobacco    Never Used     History   Alcohol Use    Yes     Comment: social-a few beers per week      History   Drug Use No       Current Outpatient Prescriptions   Medication Sig Dispense Refill    Ascorbic Acid (VITAMIN C) 500 MG CAPS Take 1 tablet by mouth daily      Calcium-Magnesium-Vitamin D (CALCIUM 500 PO) 1000iu      Cholecalciferol (VITAMIN D-3) 1000 units CAPS Take by mouth daily      Cranberry 1000 MG CAPS Take 1 capsule by mouth daily      vitamin E, tocopherol, (vitamin E) 400 units capsule Take by mouth daily      tetanus-diphtheria-acellular pertussis (BOOSTRIX) injection Inject 0 5 mL into a muscle once for 1 dose 0 5 mL 0    Zoster Vac Recomb Adjuvanted (SHINGRIX) 50 MCG SUSR Inject 50 mcg into a muscle once for 1 dose 1 each 0     No current facility-administered medications for this visit  No Known Allergies  Immunization History   Administered Date(s) Administered    Influenza, high dose seasonal 0 5 mL 09/20/2018    Pneumococcal Conjugate 13-Valent 01/16/2017    Pneumococcal Polysaccharide PPV23 09/20/2018    Zoster 06/09/2005       Patient Care Team:  Vega Donis MD as PCP - General  Carmelina Azul MD    Medicare Screening Tests and Risk Assessments:  Terri Wilkins is here for his Subsequent Wellness visit  Health Risk Assessment:  Patient rates overall health as very good  Patient feels that their physical health rating is Same  Eyesight was rated as Same  Hearing was rated as Slightly worse  Patient feels that their emotional and mental health rating is Same  Pain experienced by patient in the last 7 days has been None  Emotional/Mental Health:  Patient has been feeling nervous/anxious  PHQ-9 Depression Screening:    Frequency of the following problems over the past two weeks:      1  Little interest or pleasure in doing things: 0 - not at all      2  Feeling down, depressed, or hopeless: 0 - not at all  PHQ-2 Score: 0          Broken Bones/Falls: Fall Risk Assessment:    In the past year, patient has experienced: No history of falling in past year          Bladder/Bowel:  Patient reports no loss of bowel control  Immunizations:  Patient has not had a flu vaccination within the last year  Patient has not received a pneumonia shot  Patient has received a shingles shot        Home Safety:  Patient does not have trouble with stairs inside or outside of their home  Patient currently reports that there are no safety hazards present in home, working smoke alarms, working carbon monoxide detectors  Preventative Screenings:   prostate cancer screen performed, colon cancer screen completed, glaucoma eye exam completed,     Nutrition:  Current diet: Regular with servings of the following:    Medications:  Patient is currently taking over-the-counter supplements  Patient is able to manage medications  Lifestyle Choices:  Patient reports no tobacco use  Patient has not smoked or used tobacco in the past   Patient reports alcohol use  Alcohol use per week: 5  Patient drives a vehicle  Patient wears seat belt  Current level of exercise of physical activity described by patient as: High scuba dive, street bycicle, rowing machine, snow ski , water ski   Activities of Daily Living:  Can get out of bed by his or her self, able to dress self, able to make own meals, able to do own shopping, able to bathe self, can do own laundry/housekeeping, can manage own money, pay bills and track expenses    Previous Hospitalizations:  No hospitalization or ED visit in past 12 months        Advanced Directives:  Patient has decided on a power of   Patient has spoken to designated power of   Patient has completed advanced directive  Additional Comments: Mariangel Shows   Phone # [phone]    Preventative Screening/Counseling:      Cardiovascular:      General: Screening Current          Diabetes:      General: Screening Current          Colorectal Cancer:      Due for studies: Colonoscopy - Low Risk      Comments:  Will be going to Dr Jah Valadez in November        Prostate Cancer:      General: Screening Current      Comments: Sees Uro yearly        Osteoporosis:      General: Screening Not Indicated          AAA:      General: Screening Current      Comments: Reports recent AAA US that was negative        Glaucoma: General: Screening Current          Hepatitis C:      General: Screening Current        Advanced Directives:   Patient has living will for healthcare, Information on ACP and/or AD not provided  Immunizations:      Influenza: Influenza Due Today      Pneumococcal: Pneumococcal Due Today      Shingrix: Risks & Benefits Discussed      TDAP: Risks & Benefits Discussed  Additional Comments:   Recommendations for the new shingles vaccine, Shingrix, were discussed  The patient was encouraged to check with insurance for coverage of this vaccine and then have administered through pharmacy if able  Also given script for Tdap to get through pharmacy

## 2018-09-20 NOTE — ASSESSMENT & PLAN NOTE
No recent issues per patient - will continue to monitor  He also continue to follow with Dr Bianca Jacobson

## 2018-09-20 NOTE — ASSESSMENT & PLAN NOTE
He will continue to follow with Urology  He reports he will be getting labs done prior to his appointment next month

## 2018-09-20 NOTE — ASSESSMENT & PLAN NOTE
We reviewed that his calculated ASCVD risk today is 13 5% based on last month's labs  He does not wish to take any medication for his cholesterol  He was encouraged to limit his intake of fatty food including red meat, cheese, fried food, and butter  We will continue to monitor

## 2018-10-01 ENCOUNTER — APPOINTMENT (OUTPATIENT)
Dept: LAB | Facility: MEDICAL CENTER | Age: 68
End: 2018-10-01
Attending: UROLOGY
Payer: COMMERCIAL

## 2018-10-01 DIAGNOSIS — N40.1 ENLARGED PROSTATE WITH LOWER URINARY TRACT SYMPTOMS (LUTS): ICD-10-CM

## 2018-10-01 LAB — PSA SERPL-MCNC: 3.9 NG/ML (ref 0–4)

## 2018-10-01 PROCEDURE — 36415 COLL VENOUS BLD VENIPUNCTURE: CPT

## 2018-10-01 PROCEDURE — 84153 ASSAY OF PSA TOTAL: CPT

## 2018-10-12 ENCOUNTER — OFFICE VISIT (OUTPATIENT)
Dept: UROLOGY | Facility: MEDICAL CENTER | Age: 68
End: 2018-10-12
Payer: COMMERCIAL

## 2018-10-12 VITALS
DIASTOLIC BLOOD PRESSURE: 80 MMHG | WEIGHT: 137 LBS | BODY MASS INDEX: 22.02 KG/M2 | SYSTOLIC BLOOD PRESSURE: 122 MMHG | HEIGHT: 66 IN

## 2018-10-12 DIAGNOSIS — N40.1 BPH WITH OBSTRUCTION/LOWER URINARY TRACT SYMPTOMS: Primary | ICD-10-CM

## 2018-10-12 DIAGNOSIS — N13.8 BPH WITH OBSTRUCTION/LOWER URINARY TRACT SYMPTOMS: Primary | ICD-10-CM

## 2018-10-12 LAB
SL AMB  POCT GLUCOSE, UA: NORMAL
SL AMB LEUKOCYTE ESTERASE,UA: NORMAL
SL AMB POCT BILIRUBIN,UA: NORMAL
SL AMB POCT BLOOD,UA: NORMAL
SL AMB POCT CLARITY,UA: CLEAR
SL AMB POCT COLOR,UA: YELLOW
SL AMB POCT KETONES,UA: NORMAL
SL AMB POCT NITRITE,UA: NORMAL
SL AMB POCT PH,UA: 6
SL AMB POCT SPECIFIC GRAVITY,UA: 1
SL AMB POCT URINE PROTEIN: NORMAL
SL AMB POCT UROBILINOGEN: 0.2

## 2018-10-12 PROCEDURE — 81003 URINALYSIS AUTO W/O SCOPE: CPT | Performed by: UROLOGY

## 2018-10-12 PROCEDURE — 4040F PNEUMOC VAC/ADMIN/RCVD: CPT | Performed by: UROLOGY

## 2018-10-12 PROCEDURE — 99213 OFFICE O/P EST LOW 20 MIN: CPT | Performed by: UROLOGY

## 2018-10-12 NOTE — PROGRESS NOTES
Assessment/Plan:  Doing well  PSA normal  FU 1 yr  No problem-specific Assessment & Plan notes found for this encounter  Diagnoses and all orders for this visit:    BPH with obstruction/lower urinary tract symptoms  -     POCT urine dip auto non-scope          Subjective:      Patient ID: Davie Weems is a 77 y o  male  FU for moderate BPH, no signif  Change in sx since last visit  Noct x 1-2, daytime good stream, no incont or hesitancy  The following portions of the patient's history were reviewed and updated as appropriate: allergies, current medications, past family history, past medical history, past social history, past surgical history and problem list     Review of Systems   All other systems reviewed and are negative  Objective:      /80   Ht 5' 6" (1 676 m)   Wt 62 1 kg (137 lb)   BMI 22 11 kg/m²          Physical Exam   Constitutional: He is oriented to person, place, and time  He appears well-developed and well-nourished  No distress  HENT:   Head: Normocephalic and atraumatic  Eyes: Conjunctivae are normal    Cardiovascular: Normal rate and regular rhythm  Pulmonary/Chest: Effort normal and breath sounds normal  No respiratory distress  He has no wheezes  Abdominal: Soft  Bowel sounds are normal  He exhibits no distension and no mass  There is no tenderness  Genitourinary:   Genitourinary Comments: Penis and testes wnl  Prostate min enlarged, smooth   Neurological: He is alert and oriented to person, place, and time  Skin: Skin is warm and dry  He is not diaphoretic

## 2019-08-13 ENCOUNTER — OFFICE VISIT (OUTPATIENT)
Dept: FAMILY MEDICINE CLINIC | Facility: CLINIC | Age: 69
End: 2019-08-13
Payer: COMMERCIAL

## 2019-08-13 VITALS
OXYGEN SATURATION: 97 % | RESPIRATION RATE: 20 BRPM | HEART RATE: 70 BPM | TEMPERATURE: 98 F | WEIGHT: 134.4 LBS | SYSTOLIC BLOOD PRESSURE: 120 MMHG | HEIGHT: 66 IN | BODY MASS INDEX: 21.6 KG/M2 | DIASTOLIC BLOOD PRESSURE: 78 MMHG

## 2019-08-13 DIAGNOSIS — F41.9 ANXIETY: Primary | ICD-10-CM

## 2019-08-13 DIAGNOSIS — Z13.1 ENCOUNTER FOR SCREENING EXAMINATION FOR IMPAIRED GLUCOSE REGULATION AND DIABETES MELLITUS: ICD-10-CM

## 2019-08-13 DIAGNOSIS — R53.83 OTHER FATIGUE: ICD-10-CM

## 2019-08-13 PROCEDURE — 99213 OFFICE O/P EST LOW 20 MIN: CPT | Performed by: INTERNAL MEDICINE

## 2019-08-13 PROCEDURE — 1160F RVW MEDS BY RX/DR IN RCRD: CPT | Performed by: INTERNAL MEDICINE

## 2019-08-13 PROCEDURE — 1036F TOBACCO NON-USER: CPT | Performed by: INTERNAL MEDICINE

## 2019-08-13 PROCEDURE — 3008F BODY MASS INDEX DOCD: CPT | Performed by: INTERNAL MEDICINE

## 2019-08-13 RX ORDER — AMOXICILLIN 500 MG/1
CAPSULE ORAL
Refills: 0 | COMMUNITY
Start: 2019-06-06 | End: 2021-11-03 | Stop reason: CLARIF

## 2019-08-13 NOTE — PROGRESS NOTES
Assessment/Plan:       Diagnoses and all orders for this visit:    Anxiety  -     TSH, 3rd generation with Free T4 reflex    Encounter for screening examination for impaired glucose regulation and diabetes mellitus  -     Comprehensive metabolic panel    Other fatigue  -     TSH, 3rd generation with Free T4 reflex  -     Comprehensive metabolic panel  -     CBC and differential     Arnoldo Oneil was seen and examined in the office today  His symptoms could possibly represent anxiety with the upcoming closing he has  We discussed that we could get blood work as well as it has been some time  Will let him know of the results and he will monitor his symptoms  Closing on the property is soon and if this is related to anxiety, this hopefully will resolve as well  Otherwise no other changes were made  Subjective:      Patient ID: José Miguel Willson is a 77 y o  male  Arnoldo Oneil is here today for complaints of not feeling well  He reports noticing that he has been more anxious and not sure if it's related to this  He reports he is selling a property in the [facility] (closing is at the end of August)  He has been noticing this for the also 1-2 months  He reports trouble falling asleep and decreased appetite as well  He thought he lost weight  He reports being interested in testing for diabetes as he has a family history as well  He denies any other systemic complaints  The following portions of the patient's history were reviewed and updated as appropriate: allergies, current medications, past family history, past medical history, past social history, past surgical history and problem list     Review of Systems   Constitutional: Positive for appetite change and fatigue  Negative for fever  Respiratory: Negative for cough, choking, shortness of breath and wheezing  Cardiovascular: Negative for chest pain, palpitations and leg swelling  Gastrointestinal: Negative for abdominal pain, nausea and vomiting  Psychiatric/Behavioral: Positive for sleep disturbance  The patient is nervous/anxious  Objective:      /78   Pulse 70   Temp 98 °F (36 7 °C)   Resp 20   Ht 5' 6" (1 676 m)   Wt 61 kg (134 lb 6 4 oz)   SpO2 97%   BMI 21 69 kg/m²          Physical Exam   Constitutional: He is oriented to person, place, and time  He appears well-developed and well-nourished  No distress  HENT:   Head: Normocephalic and atraumatic  Eyes: Conjunctivae and EOM are normal  Right eye exhibits no discharge  Left eye exhibits no discharge  No scleral icterus  Neck: Normal range of motion  Cardiovascular: Normal rate, regular rhythm and normal heart sounds  No murmur heard  Pulmonary/Chest: Effort normal and breath sounds normal  No respiratory distress  He has no wheezes  Abdominal: Soft  Bowel sounds are normal  There is no tenderness  Musculoskeletal: Normal range of motion  He exhibits no edema  Neurological: He is alert and oriented to person, place, and time  Skin: Skin is warm and dry  He is not diaphoretic  Psychiatric: He has a normal mood and affect  His speech is normal and behavior is normal  Judgment and thought content normal    Vitals reviewed

## 2019-08-14 ENCOUNTER — APPOINTMENT (OUTPATIENT)
Dept: LAB | Facility: CLINIC | Age: 69
End: 2019-08-14
Payer: COMMERCIAL

## 2019-08-14 LAB
ALBUMIN SERPL BCP-MCNC: 3.8 G/DL (ref 3.5–5)
ALP SERPL-CCNC: 54 U/L (ref 46–116)
ALT SERPL W P-5'-P-CCNC: 26 U/L (ref 12–78)
ANION GAP SERPL CALCULATED.3IONS-SCNC: 6 MMOL/L (ref 4–13)
AST SERPL W P-5'-P-CCNC: 15 U/L (ref 5–45)
BASOPHILS # BLD AUTO: 0.04 THOUSANDS/ΜL (ref 0–0.1)
BASOPHILS NFR BLD AUTO: 1 % (ref 0–1)
BILIRUB SERPL-MCNC: 0.51 MG/DL (ref 0.2–1)
BUN SERPL-MCNC: 21 MG/DL (ref 5–25)
CALCIUM SERPL-MCNC: 8.7 MG/DL (ref 8.3–10.1)
CHLORIDE SERPL-SCNC: 104 MMOL/L (ref 100–108)
CO2 SERPL-SCNC: 26 MMOL/L (ref 21–32)
CREAT SERPL-MCNC: 0.91 MG/DL (ref 0.6–1.3)
EOSINOPHIL # BLD AUTO: 0.13 THOUSAND/ΜL (ref 0–0.61)
EOSINOPHIL NFR BLD AUTO: 2 % (ref 0–6)
ERYTHROCYTE [DISTWIDTH] IN BLOOD BY AUTOMATED COUNT: 14.8 % (ref 11.6–15.1)
GFR SERPL CREATININE-BSD FRML MDRD: 85 ML/MIN/1.73SQ M
GLUCOSE P FAST SERPL-MCNC: 96 MG/DL (ref 65–99)
HCT VFR BLD AUTO: 45 % (ref 36.5–49.3)
HGB BLD-MCNC: 15 G/DL (ref 12–17)
IMM GRANULOCYTES # BLD AUTO: 0.02 THOUSAND/UL (ref 0–0.2)
IMM GRANULOCYTES NFR BLD AUTO: 0 % (ref 0–2)
LYMPHOCYTES # BLD AUTO: 1.7 THOUSANDS/ΜL (ref 0.6–4.47)
LYMPHOCYTES NFR BLD AUTO: 30 % (ref 14–44)
MCH RBC QN AUTO: 29.8 PG (ref 26.8–34.3)
MCHC RBC AUTO-ENTMCNC: 33.3 G/DL (ref 31.4–37.4)
MCV RBC AUTO: 89 FL (ref 82–98)
MONOCYTES # BLD AUTO: 0.73 THOUSAND/ΜL (ref 0.17–1.22)
MONOCYTES NFR BLD AUTO: 13 % (ref 4–12)
NEUTROPHILS # BLD AUTO: 3.1 THOUSANDS/ΜL (ref 1.85–7.62)
NEUTS SEG NFR BLD AUTO: 54 % (ref 43–75)
NRBC BLD AUTO-RTO: 0 /100 WBCS
PLATELET # BLD AUTO: 230 THOUSANDS/UL (ref 149–390)
PMV BLD AUTO: 11.8 FL (ref 8.9–12.7)
POTASSIUM SERPL-SCNC: 3.9 MMOL/L (ref 3.5–5.3)
PROT SERPL-MCNC: 6.9 G/DL (ref 6.4–8.2)
RBC # BLD AUTO: 5.04 MILLION/UL (ref 3.88–5.62)
SODIUM SERPL-SCNC: 136 MMOL/L (ref 136–145)
TSH SERPL DL<=0.05 MIU/L-ACNC: 1.75 UIU/ML (ref 0.36–3.74)
WBC # BLD AUTO: 5.72 THOUSAND/UL (ref 4.31–10.16)

## 2019-08-14 PROCEDURE — 84443 ASSAY THYROID STIM HORMONE: CPT | Performed by: INTERNAL MEDICINE

## 2019-08-14 PROCEDURE — 80053 COMPREHEN METABOLIC PANEL: CPT | Performed by: INTERNAL MEDICINE

## 2019-08-14 PROCEDURE — 85025 COMPLETE CBC W/AUTO DIFF WBC: CPT | Performed by: INTERNAL MEDICINE

## 2019-08-14 PROCEDURE — 36415 COLL VENOUS BLD VENIPUNCTURE: CPT | Performed by: INTERNAL MEDICINE

## 2019-08-30 ENCOUNTER — OFFICE VISIT (OUTPATIENT)
Dept: FAMILY MEDICINE CLINIC | Facility: CLINIC | Age: 69
End: 2019-08-30
Payer: COMMERCIAL

## 2019-08-30 VITALS
HEIGHT: 66 IN | HEART RATE: 70 BPM | WEIGHT: 133.4 LBS | SYSTOLIC BLOOD PRESSURE: 148 MMHG | DIASTOLIC BLOOD PRESSURE: 82 MMHG | OXYGEN SATURATION: 98 % | BODY MASS INDEX: 21.44 KG/M2

## 2019-08-30 DIAGNOSIS — F41.9 ANXIETY: Primary | ICD-10-CM

## 2019-08-30 PROCEDURE — 99214 OFFICE O/P EST MOD 30 MIN: CPT | Performed by: FAMILY MEDICINE

## 2019-08-30 RX ORDER — ALPRAZOLAM 0.5 MG/1
0.5 TABLET ORAL EVERY 4 HOURS PRN
Qty: 20 TABLET | Refills: 0 | Status: SHIPPED | OUTPATIENT
Start: 2019-08-30 | End: 2019-10-07

## 2019-08-30 NOTE — PATIENT INSTRUCTIONS
We discussed acute on chronic anxiety, I did give prescription for alprazolam 0 5 mg to use every 4 hours as needed over the holiday weekend, we discussed that if he notes any worsening he should proceed to the emergency room  He will avoid alcohol, avoid driving while using medication  We reviewed his recent CMP/CBC/TSH were unremarkable  Re-evaluate here next week, we discussed possibly adding long-term medication, consider SSRI, consider low dose Seroquel, consider Psych consult  Consider brain imaging

## 2019-08-30 NOTE — PROGRESS NOTES
FAMILY PRACTICE OFFICE VISIT  [facility]  [id number]   [address] [place], [address]      NAME: Jade Medellin  AGE: 77 y o  SEX: male  : 1950   MRN: [id number]    DATE: 2019  TIME: 12:50 PM    Assessment and Plan     Problem List Items Addressed This Visit     None      Visit Diagnoses     Anxiety    -  Primary    Relevant Medications    ALPRAZolam (XANAX) 0 5 mg tablet          Patient Instructions   We discussed acute on chronic anxiety, I did give prescription for alprazolam 0 5 mg to use every 4 hours as needed over the holiday weekend, we discussed that if he notes any worsening he should proceed to the emergency room  He will avoid alcohol, avoid driving while using medication  We reviewed his recent CMP/CBC/TSH were unremarkable  Re-evaluate here next week, we discussed possibly adding long-term medication, consider SSRI, consider low dose Seroquel, consider Psych consult  Consider brain imaging  Chief Complaint     Chief Complaint   Patient presents with    Anxiety       History of Present Illness   Jade Medellin is a 77y o -year-old male who I had last seen in , he is in today for an acute visit, notes significant anxiety/can't sit still "  He had been seen here  by Dr Deanna Ward, he did do blood work which was unremarkable  Symptoms have been present for the last month, he was attributing symptoms to selling family home in the [place]  Did have closing the other day, symptoms persist   Was at breakfast with a friend who is a pharmacist who advised he get checked  Prior psychiatric history, relates in [facility] he had undergone a divorce, mom  any had seen Psychiatry, was treated with Prozac for unknown duration, did not like using it      Has some difficulty falling asleep but did sleep through the night last night  Has decreased appetite, has dropped a few lb    Denies significant alcohol use, no drug use, no new supplement use  No HI/SI-does not feel he is depressed  Has noted some issues also with balance, has had no falls or head trauma, no focal weakness  Support system is girlfriend along with best friend  Review of Systems   Review of Systems   Constitutional: Negative for chills, fatigue and fever  HENT: Negative for congestion and mouth sores  Chronic hoarseness related to remote skiing accident, unchanged  No new issues with swallowing   Eyes: Negative for visual disturbance  Respiratory: Negative for chest tightness and wheezing  No increased cough or with shortness of breath verses his baseline   Cardiovascular: Negative for chest pain and leg swelling  Gastrointestinal: Negative for abdominal pain, blood in stool, nausea and vomiting  No change in bowel   Genitourinary: Negative for dysuria and hematuria  Neurological: Negative for dizziness, seizures, syncope, weakness, light-headedness and headaches  Hematological: Does not bruise/bleed easily  Psychiatric/Behavioral: Negative for confusion, self-injury and suicidal ideas  The patient is nervous/anxious          Active Problem List     Patient Active Problem List   Diagnosis    Arthralgia of multiple joints    BPH with obstruction/lower urinary tract symptoms    DDD (degenerative disc disease), cervical    Elevated PSA    [facility]osed [facility] dorsal [facility]thoracic[facility] vertebra ([facility])    Mixed hyperlipidemia    Nocturia    Subarachnoid hemorrhage (HCC)    Vocal cord dysfunction    PMR [facility]polymyalgia rheumatica) ([facility])    Acquired trigger finger    Bunion    Chondromalacia patellae    Abnormal C-reactive protein    Crystal arthropathy of ankle and foot    ESR raised    Hip pain    Osteoarthritis of knee    Shoulder joint pain    Disability of walking    Kidney stone    Vocal cord paralysis       Past Medical History:  Reviewed    Past Surgical History:  Reviewed    Family History:  Reviewed    Social History:  Reviewed    Objective     Vitals:    08/30/19 1124   BP: 148/82   BP Location: Left arm   Patient Position: Sitting   Cuff Size: Large   Pulse: 70   SpO2: 98%   Weight: 60 5 kg (133 lb 6 4 oz)   Height: 5' 6" (1 676 m)     Body mass index is 21 53 kg/m²  BP Readings from Last 3 Encounters:   08/30/19 148/82   08/13/19 120/78   10/12/18 122/80       Wt Readings from Last 3 Encounters:   08/30/19 60 5 kg (133 lb 6 4 oz)   08/13/19 61 kg (134 lb 6 4 oz)   10/12/18 62 1 kg (137 lb)       Physical Exam   Constitutional: He is oriented to person, place, and time  Anxious 58-year-old male, occasionally arises from table to pace, at 1 point felt may need to leave room due to anxiety/closed in sensation  Answers appear appropriate, he is well-kept, has no focal neurological deficit but does have frequent position changes throughout visit  No cerebellar dysfunction  Denies harmful/suicidal intent  Eyes: Pupils are equal, round, and reactive to light  Conjunctivae and EOM are normal  No scleral icterus  Cardiovascular: Normal rate, regular rhythm and normal heart sounds  Pulmonary/Chest: Effort normal and breath sounds normal    Musculoskeletal: He exhibits no edema  Lymphadenopathy:     He has no cervical adenopathy  Neurological: He is alert and oriented to person, place, and time         ALLERGIES:  No Known Allergies    Current Medications     Current Outpatient Medications   Medication Sig Dispense Refill    Ascorbic Acid (VITAMIN C) 500 MG CAPS Take 1 tablet by mouth daily      Cholecalciferol (VITAMIN D-3) 1000 units CAPS Take by mouth daily      vitamin E, tocopherol, (vitamin E) 400 units capsule Take by mouth daily      ALPRAZolam (XANAX) 0 5 mg tablet Take 1 tablet (0 5 mg total) by mouth every 4 (four) hours as needed for anxiety 20 tablet 0    amoxicillin (AMOXIL) 500 mg capsule TAKE 4 CAPSULES BY MOUTH 1 HOUR PRIOR TO APPT  0     No current facility-administered medications for this visit  No orders of the defined types were placed in this encounter          Earnestine Robles DO

## 2019-09-03 ENCOUNTER — OFFICE VISIT (OUTPATIENT)
Dept: FAMILY MEDICINE CLINIC | Facility: CLINIC | Age: 69
End: 2019-09-03
Payer: COMMERCIAL

## 2019-09-03 VITALS
DIASTOLIC BLOOD PRESSURE: 80 MMHG | HEIGHT: 66 IN | SYSTOLIC BLOOD PRESSURE: 140 MMHG | HEART RATE: 80 BPM | WEIGHT: 132.8 LBS | RESPIRATION RATE: 18 BRPM | OXYGEN SATURATION: 98 % | BODY MASS INDEX: 21.34 KG/M2 | TEMPERATURE: 98 F

## 2019-09-03 DIAGNOSIS — F41.1 GENERALIZED ANXIETY DISORDER: Primary | ICD-10-CM

## 2019-09-03 PROCEDURE — 99213 OFFICE O/P EST LOW 20 MIN: CPT | Performed by: INTERNAL MEDICINE

## 2019-09-03 RX ORDER — SERTRALINE HYDROCHLORIDE 25 MG/1
25 TABLET, FILM COATED ORAL DAILY
Qty: 30 TABLET | Refills: 1 | Status: SHIPPED | OUTPATIENT
Start: 2019-09-03 | End: 2019-09-10 | Stop reason: SINTOL

## 2019-09-03 NOTE — PROGRESS NOTES
Assessment/Plan:     Diagnoses and all orders for this visit:    Generalized anxiety disorder  -     sertraline (ZOLOFT) 25 mg tablet; Take 1 tablet (25 mg total) by mouth daily      Mikaela Crenshaw was seen and examined in the office today  We discussed his overall anxiety and does note some low mood as well  He has been on Prozac in the past but will try Zoloft with a follow up in 4 weeks  He is not interested in talking to anyone at this time  Otherwise no other changes were made  Subjective:      Patient ID: Maldonado Alvarez is a 77 y o  male  Mikaela Crenshaw is here today for a follow up on his anxiety  He was seen recently by Dr Ruby Cheung and given the underlying anxiety that did not improve after closure, he was started on Xanax  He has been taking it sparingly  He did feel some relief from it but did note some sleepiness  The following portions of the patient's history were reviewed and updated as appropriate: allergies, current medications, past family history, past medical history, past social history, past surgical history and problem list     Review of Systems   Constitutional: Negative for unexpected weight change  Respiratory: Negative for cough and chest tightness  Cardiovascular: Negative for chest pain  Gastrointestinal: Negative for abdominal pain, diarrhea, nausea and vomiting  Neurological: Negative for dizziness and headaches  Psychiatric/Behavioral: Positive for dysphoric mood  The patient is nervous/anxious  Objective:      /80   Pulse 80   Temp 98 °F (36 7 °C)   Resp 18   Ht 5' 6" (1 676 m)   Wt 60 2 kg (132 lb 12 8 oz)   SpO2 98%   BMI 21 43 kg/m²          Physical Exam   Constitutional: He is oriented to person, place, and time  He appears well-developed and well-nourished  No distress  HENT:   Head: Normocephalic and atraumatic  Eyes: Conjunctivae and EOM are normal  Right eye exhibits no discharge  Left eye exhibits no discharge  No scleral icterus     Neck: Normal range of motion  Cardiovascular: Normal rate, regular rhythm and normal heart sounds  No murmur heard  Pulmonary/Chest: Effort normal and breath sounds normal  No respiratory distress  He has no wheezes  Lymphadenopathy:     He has no cervical adenopathy  Neurological: He is alert and oriented to person, place, and time  Skin: Skin is warm and dry  He is not diaphoretic  Psychiatric: He has a normal mood and affect  His speech is normal and behavior is normal  Judgment and thought content normal    Vitals reviewed

## 2019-09-09 ENCOUNTER — OFFICE VISIT (OUTPATIENT)
Dept: FAMILY MEDICINE CLINIC | Facility: CLINIC | Age: 69
End: 2019-09-09
Payer: COMMERCIAL

## 2019-09-09 VITALS
WEIGHT: 132 LBS | HEIGHT: 66 IN | OXYGEN SATURATION: 97 % | SYSTOLIC BLOOD PRESSURE: 122 MMHG | BODY MASS INDEX: 21.21 KG/M2 | DIASTOLIC BLOOD PRESSURE: 70 MMHG | HEART RATE: 66 BPM

## 2019-09-09 DIAGNOSIS — F41.1 GENERALIZED ANXIETY DISORDER: Primary | ICD-10-CM

## 2019-09-09 PROCEDURE — 99213 OFFICE O/P EST LOW 20 MIN: CPT | Performed by: PHYSICIAN ASSISTANT

## 2019-09-09 NOTE — ASSESSMENT & PLAN NOTE
Encouraged to continue with the sertraline 25 mg daily since it only caused dizziness one day  He has otherwise been fine with it  He was encouraged to use the alprazolam (1/2 tablet though) if needed for uncontrolled symptoms  He was encouraged to keep appointment in a few weeks for follow-up  He was directed to call if his symptoms worsen or fail to improve  We reviewed that the sertraline should start to improve his anxiety gradually over the next few weeks

## 2019-09-09 NOTE — PATIENT INSTRUCTIONS
Generalized anxiety disorder  Encouraged to continue with the sertraline 25 mg daily since it only caused dizziness one day  He has otherwise been fine with it  He was encouraged to use the alprazolam (1/2 tablet though) if needed for uncontrolled symptoms  He was encouraged to keep appointment in a few weeks for follow-up  He was directed to call if his symptoms worsen or fail to improve  We reviewed that the sertraline should start to improve his anxiety gradually over the next few weeks

## 2019-09-09 NOTE — PROGRESS NOTES
FAMILY PRACTICE OFFICE VISIT  [facility]       NAME: Kevin Martinez  AGE: 77 y o  SEX: male       : 1950        MRN: [id number]    DATE: 2019  TIME: 3:17 PM    Assessment and Plan     Problem List Items Addressed This Visit        Other    Generalized anxiety disorder - Primary     Encouraged to continue with the sertraline 25 mg daily since it only caused dizziness one day  He has otherwise been fine with it  He was encouraged to use the alprazolam (1/2 tablet though) if needed for uncontrolled symptoms  He was encouraged to keep appointment in a few weeks for follow-up  He was directed to call if his symptoms worsen or fail to improve  We reviewed that the sertraline should start to improve his anxiety gradually over the next few weeks  Chief Complaint     Chief Complaint   Patient presents with    Follow-up     Anxiety        History of Present Illness   Kevin Martinez is a 77y o -year-old male who presents for anxiety  Patient was seen several times in the last few weeks for anxiety  This was initially attributed to his selling of a home  He had labs to rule out a medical cause of the symptoms but these were unremarkable  On his second visit for this, he was given alprazolam to use as needed  Then when seen for the third time 6 days ago,  He was started on Zoloft  He reports that he has been getting some dizziness from it  He felt good on the second day of the medication but notes that he has         Review of Systems   Review of Systems   Respiratory: Negative for shortness of breath  Cardiovascular: Negative for chest pain and palpitations  Gastrointestinal: Negative for nausea and vomiting  Neurological: Negative for dizziness and headaches  Psychiatric/Behavioral: Negative for dysphoric mood  The patient is nervous/anxious          Active Problem List     Patient Active Problem List   Diagnosis    Arthralgia of multiple joints    BPH with obstruction/lower urinary tract symptoms    DDD (degenerative disc disease), cervical    Elevated PSA    Closed fracture of dorsal (thoracic) vertebra (HCC)    Mixed hyperlipidemia    Nocturia    Subarachnoid hemorrhage (HCC)    Vocal cord dysfunction    PMR [facility]algia [facility]umatica) ([facility])    Acquired trigger finger    Bunion    Chondromalacia patellae    Abnormal C-reactive protein    Crystal arthropathy of ankle and foot    ESR raised    Hip pain    Osteoarthritis of knee    Shoulder joint pain    Disability of walking    Kidney stone    Vocal cord paralysis    Generalized anxiety disorder         Past Medical History:  Past Medical History:   Diagnosis Date    Abdominal pain     Abscess of right thigh     Last Assessed: 7/29/2016    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    BPH with obstruction/lower urinary tract symptoms     Closed compression fracture of sacrum (HCC)     Contusion of lung     Current chronic use of steroids     off 9/14    Elevated PSA     Incomplete bladder emptying     Inguinal hernia     Last Assessed: 11/7/2014    Kidney stone     Nephrolithiasis     Nocturia     Pneumothorax, right     Last Assessed: 12/30/2016    Polymyalgia rheumatica ([address] ) 2013    Rheumatic fever     on Pcn x yrs    Rib fracture     Right flank pain     Subdural hematoma ([address] ) 02/2010    Vocal cord paralysis     Weak urinary stream        Past Surgical History:  Past Surgical History:   Procedure Laterality Date    COLONOSCOPY      Complete: Patience Lapidus    COLONOSCOPY  2010    ENDOTRACHEAL INTUBATION EMERGENT      INGUINAL HERNIA REPAIR Right     THROAT SURGERY      throat Larynx Vocal Cord Mobility - twice    TRACHEOSTOMY      Emergency       Family History:  Family History   Problem Relation Age of Onset    Diabetes Mother     Lung cancer Mother     Nephrolithiasis Father     Stomach cancer Maternal Grandmother     Cancer Family     Diabetes Family     Urolithiasis Family        Social History:  Social History     Socioeconomic History    Marital status:      Spouse name: Not on file    Number of children: Not on file    Years of education: Not on file    Highest education level: Not on file   Occupational History    Not on file   Social Needs    Financial resource strain: Not on file    Food insecurity:     Worry: Not on file     Inability: Not on file    Transportation needs:     Medical: Not on file     Non-medical: Not on file   Tobacco Use    Smoking status: Never Smoker    Smokeless tobacco: Never Used   Substance and Sexual Activity    Alcohol use: Yes     Comment: Drinks socially; "a few beers a week"      Drug use: No    Sexual activity: Not on file   Lifestyle    Physical activity:     Days per week: Not on file     Minutes per session: Not on file    Stress: Not on file   Relationships    Social connections:     Talks on phone: Not on file     Gets together: Not on file     Attends Christian service: Not on file     Active member of club or organization: Not on file     Attends meetings of clubs or organizations: Not on file     Relationship status: Not on file    Intimate partner violence:     Fear of current or ex partner: Not on file     Emotionally abused: Not on file     Physically abused: Not on file     Forced sexual activity: Not on file   Other Topics Concern    Not on file   Social History Narrative    Caffeine use     per [employer]    Exercises regularly       Objective     Vitals:    09/09/19 1439   BP: 122/70   BP Location: Left arm   Patient Position: Sitting   Cuff Size: Standard   Pulse: 66   SpO2: 97%   Weight: 59 9 kg (132 lb)   Height: 5' 6" (1 676 m)     Wt Readings from Last 3 Encounters:   09/09/19 59 9 kg (132 lb)   09/03/19 60 2 kg (132 lb 12 8 oz)   08/30/19 60 5 kg (133 lb 6 4 oz)       Physical Exam   Constitutional: He appears well-developed and well-nourished  No distress  Neck: Neck supple  No thyromegaly present  Cardiovascular: Normal rate, regular rhythm, normal heart sounds and intact distal pulses  No murmur heard  Pulmonary/Chest: Effort normal and breath sounds normal  He has no wheezes  He has no rales  Lymphadenopathy:     He has no cervical adenopathy  Psychiatric:   Mildly anxious   Vitals reviewed        Pertinent Laboratory/Diagnostic Studies:  Lab Results   Component Value Date    GLUCOSE 114 10/13/2014    BUN 21 08/14/2019    CREATININE 0 91 08/14/2019    CALCIUM 8 7 08/14/2019     10/13/2014    K 3 9 08/14/2019    CO2 26 08/14/2019     08/14/2019     Lab Results   Component Value Date    ALT 26 08/14/2019    AST 15 08/14/2019    ALKPHOS 54 08/14/2019    BILITOT 0 5 10/13/2014       Lab Results   Component Value Date    WBC 5 72 08/14/2019    HGB 15 0 08/14/2019    HCT 45 0 08/14/2019    MCV 89 08/14/2019     08/14/2019     Lab Results   Component Value Date    TRIG 58 08/16/2018     Lab Results   Component Value Date    HDL 60 08/16/2018     Lab Results   Component Value Date    LDLCALC 129 (H) 08/16/2018     Results for orders placed or performed in visit on 08/13/19   TSH, 3rd generation with Free T4 reflex   Result Value Ref Range    TSH 3RD GENERATON 1 750 0 358 - 3 740 uIU/mL   Comprehensive metabolic panel   Result Value Ref Range    Sodium 136 136 - 145 mmol/L    Potassium 3 9 3 5 - 5 3 mmol/L    Chloride 104 100 - 108 mmol/L    CO2 26 21 - 32 mmol/L    ANION GAP 6 4 - 13 mmol/L    BUN 21 5 - 25 mg/dL    Creatinine 0 91 0 60 - 1 30 mg/dL    Glucose, Fasting 96 65 - 99 mg/dL    Calcium 8 7 8 3 - 10 1 mg/dL    AST 15 5 - 45 U/L    ALT 26 12 - 78 U/L    Alkaline Phosphatase 54 46 - 116 U/L    Total Protein 6 9 6 4 - 8 2 g/dL    Albumin 3 8 3 5 - 5 0 g/dL    Total Bilirubin 0 51 0 20 - 1 00 mg/dL    eGFR 85 ml/min/1 73sq m   CBC and differential   Result Value Ref Range    WBC 5 72 4 31 - 10 16 Thousand/uL    RBC 5 04 3 88 - 5 62 Million/uL    Hemoglobin 15 0 12 0 - 17 0 g/dL    Hematocrit 45 0 36 5 - 49 3 %    MCV 89 82 - 98 fL    MCH 29 8 26 8 - 34 3 pg    MCHC 33 3 31 4 - 37 4 g/dL    RDW 14 8 11 6 - 15 1 %    MPV 11 8 8 9 - 12 7 fL    Platelets 125 557 - 206 Thousands/uL    nRBC 0 /100 WBCs    Neutrophils Relative 54 43 - 75 %    Immat GRANS % 0 0 - 2 %    Lymphocytes Relative 30 14 - 44 %    Monocytes Relative 13 (H) 4 - 12 %    Eosinophils Relative 2 0 - 6 %    Basophils Relative 1 0 - 1 %    Neutrophils Absolute 3 10 1 85 - 7 62 Thousands/µL    Immature Grans Absolute 0 02 0 00 - 0 20 Thousand/uL    Lymphocytes Absolute 1 70 0 60 - 4 47 Thousands/µL    Monocytes Absolute 0 73 0 17 - 1 22 Thousand/µL    Eosinophils Absolute 0 13 0 00 - 0 61 Thousand/µL    Basophils Absolute 0 04 0 00 - 0 10 Thousands/µL         ALLERGIES:  No Known Allergies    Current Medications     Current Outpatient Medications   Medication Sig Dispense Refill    amoxicillin (AMOXIL) 500 mg capsule TAKE 4 CAPSULES BY MOUTH 1 HOUR PRIOR TO APPT  0    Ascorbic Acid (VITAMIN C) 500 MG CAPS Take 1 tablet by mouth daily      Cholecalciferol (VITAMIN D-3) 1000 units CAPS Take by mouth daily      sertraline (ZOLOFT) 25 mg tablet Take 1 tablet (25 mg total) by mouth daily 30 tablet 1    vitamin E, tocopherol, (vitamin E) 400 units capsule Take by mouth daily      ALPRAZolam (XANAX) 0 5 mg tablet Take 1 tablet (0 5 mg total) by mouth every 4 (four) hours as needed for anxiety (Patient not taking: Reported on 9/9/2019) 20 tablet 0     No current facility-administered medications for this visit            Health Maintenance     Health Maintenance   Topic Date Due    DXA SCAN  1950    DTaP,Tdap,and Td Vaccines (1 - Tdap) 09/10/1969    INFLUENZA VACCINE  10/01/2019 (Originally 7/1/2019)    Fall Risk  09/20/2019    Depression Screening PHQ  09/20/2019    Medicare Annual Wellness Visit (AWV)  09/20/2019    BMI: Adult 09/09/2020    CRC Screening: Colonoscopy  12/15/2020    Hepatitis C Screening  Completed    Pneumococcal Vaccine: 65+ Years  Completed    Pneumococcal Vaccine: Pediatrics (0 to 5 Years) and At-Risk Patients (6 to 59 Years)  Aged Out    HEPATITIS B VACCINES  Aged [employer] History   Administered Date(s) Administered    INFLUENZA 09/20/2018    Influenza, high dose seasonal 0 5 mL 09/20/2018    Pneumococcal Conjugate 13-Valent 01/16/2017    Pneumococcal Polysaccharide PPV23 09/20/2018    Zoster 06/09/2005, 06/09/2005       Lesly Chavira PA-C  9/9/2019 3:17 PM  [facility]

## 2019-09-10 ENCOUNTER — TELEPHONE (OUTPATIENT)
Dept: FAMILY MEDICINE CLINIC | Facility: CLINIC | Age: 69
End: 2019-09-10

## 2019-09-10 ENCOUNTER — OFFICE VISIT (OUTPATIENT)
Dept: FAMILY MEDICINE CLINIC | Facility: CLINIC | Age: 69
End: 2019-09-10
Payer: COMMERCIAL

## 2019-09-10 VITALS
BODY MASS INDEX: 21.41 KG/M2 | HEIGHT: 66 IN | OXYGEN SATURATION: 99 % | WEIGHT: 133.25 LBS | SYSTOLIC BLOOD PRESSURE: 132 MMHG | DIASTOLIC BLOOD PRESSURE: 80 MMHG | HEART RATE: 62 BPM

## 2019-09-10 DIAGNOSIS — F41.1 GENERALIZED ANXIETY DISORDER: Primary | ICD-10-CM

## 2019-09-10 PROCEDURE — 99213 OFFICE O/P EST LOW 20 MIN: CPT | Performed by: PHYSICIAN ASSISTANT

## 2019-09-10 PROCEDURE — 3008F BODY MASS INDEX DOCD: CPT | Performed by: PHYSICIAN ASSISTANT

## 2019-09-10 NOTE — PATIENT INSTRUCTIONS
STOP taking sertraline  There is no need to wean since you took this medication so briefly  You can use a 1/2 tablet of alprazolam if needed for severe anxiety/agitation  Otherwise we will hold off on new medication to clear the slate and reassess  Please call with an update on Friday to let us know how you are doing

## 2019-09-10 NOTE — PROGRESS NOTES
FAMILY PRACTICE OFFICE VISIT  [facility] - [facility] PRIMARY CARE       NAME: Jefferson Hsu  AGE: 68 y o  SEX: male       : 1950        MRN: [id number]    DATE: 9/10/2019  TIME: 1:58 PM    Assessment and Plan     Problem List Items Addressed This Visit        Other    Generalized anxiety disorder - Primary     Patient continues with anxiety but has been having significant dizziness with the Zoloft  Will stop the Zoloft and see how he does over the next few days  Will look for resolution of the dizziness and also reassess level of anxiety  Patient was asked to call with an update in 3 days  He was told to take a 1/2 tablet of the Xanax if needed for relief of severe anxiety  He should not drive after taking this medication though  Generalized anxiety disorder  Patient continues with anxiety but has been having significant dizziness with the Zoloft  Will stop the Zoloft and see how he does over the next few days  Will look for resolution of the dizziness and also reassess level of anxiety  Patient was asked to call with an update in 3 days  He was told to take a 1/2 tablet of the Xanax if needed for relief of severe anxiety  He should not drive after taking this medication though  Patient Instructions   STOP taking sertraline  There is no need to wean since you took this medication so briefly  You can use a 1/2 tablet of alprazolam if needed for severe anxiety/agitation  Otherwise we will hold off on new medication to clear the slate and reassess  Please call with an update on Friday to let us know how you are doing  Chief Complaint     Chief Complaint   Patient presents with    Follow-up     Anxiety       History of Present Illness   Jefferson Hsu is a 68y o -year-old male who presents for follow-up on anxiety  He notes that he has trouble with ongoing dizziness   Notes that he had not been honest yesterday about how much dizziness he was experiencing  He notes that he has been dizzy Thursday, Friday, Saturday, and Monday  He reports that the first day of taking Zoloft, he felt good with the medication but after the first day and a half the dizziness began  It occurs after he takes the Zoloft in the morning at 9 am and then resolves towards the end of the day  He notes that some mornings, he wakes up okay and others he wakes up feeling anxious/agitated  He has not taken any xanax since starting the Zoloft last week  He had trouble with fatigue from the Xanax - felt very tired  Review of Systems   Review of Systems   Respiratory: Negative for shortness of breath  Cardiovascular: Negative for chest pain and palpitations  Neurological: Positive for dizziness (off balance)  Psychiatric/Behavioral: Positive for dysphoric mood  The patient is nervous/anxious          Active Problem List     Patient Active Problem List   Diagnosis    Arthralgia of multiple joints    BPH with obstruction/lower urinary tract symptoms    DDD (degenerative disc disease), cervical    Elevated PSA    Closed fracture of dorsal (thoracic) vertebra ([facility])    Mixed hyperlipidemia    Nocturia    Subarachnoid hemorrhage (HCC)    Vocal cord dysfunction    PMR (polymyalgia rheumatica) ([facility])    Acquired trigger finger    Bunion    Chondromalacia patellae    Abnormal C-reactive protein    Crystal arthropathy of ankle and foot    ESR raised    Hip pain    Osteoarthritis of knee    Shoulder joint pain    Disability of walking    Kidney stone    Vocal cord paralysis    Generalized anxiety disorder         Past Medical History:  Past Medical History:   Diagnosis Date    Abdominal pain     Abscess of right thigh     Last Assessed: 7/29/2016    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    BPH with obstruction/lower urinary tract symptoms     Closed compression fracture of sacrum (HCC)     Contusion of lung     Current chronic use of steroids     off 9/14    Elevated PSA     Incomplete bladder emptying     Inguinal hernia     Last Assessed: 11/7/2014    Kidney stone     Nephrolithiasis     Nocturia     Pneumothorax, right     Last Assessed: 12/30/2016    Polymyalgia rheumatica ([facility] [address] ) 2013    Rheumatic fever     on Pcn x yrs    Rib fracture     Right flank pain     Subdural hematoma ([facility] [address] ) 02/2010    Vocal cord paralysis     Weak urinary stream        Past Surgical History:  Past Surgical History:   Procedure Laterality Date    COLONOSCOPY      Complete: Joe Fede    COLONOSCOPY  2010    ENDOTRACHEAL INTUBATION EMERGENT      INGUINAL HERNIA REPAIR Right     THROAT SURGERY      throat Larynx Vocal Cord Mobility - twice    TRACHEOSTOMY      Emergency       Family History:  Family History   Problem Relation Age of Onset    Diabetes Mother     Lung cancer Mother     Nephrolithiasis Father     Stomach cancer Maternal Grandmother     Cancer Family     Diabetes Family     Urolithiasis Family        Social History:  Social History     Socioeconomic History    Marital status:      Spouse name: Not on file    Number of children: Not on file    Years of education: Not on file    Highest education level: Not on file   Occupational History    Not on file   Social Needs    Financial resource strain: Not on file    Food insecurity:     Worry: Not on file     Inability: Not on file    Transportation needs:     Medical: Not on file     Non-medical: Not on file   Tobacco Use    Smoking status: Never Smoker    Smokeless tobacco: Never Used   Substance and Sexual Activity    Alcohol use: Yes     Comment: Drinks socially; "a few beers a week"      Drug use: No    Sexual activity: Not on file   Lifestyle    Physical activity:     Days per week: Not on file     Minutes per session: Not on file    Stress: Not on file   Relationships    Social connections:     Talks on phone: Not on file     Gets together: Not on file Attends [employer] service: Not on file     Active member of club or organization: Not on file     Attends meetings of clubs or organizations: Not on file     Relationship status: Not on file    Intimate partner violence:     Fear of current or ex partner: Not on file     Emotionally abused: Not on file     Physically abused: Not on file     Forced sexual activity: Not on file   Other Topics Concern    Not on file   Social History Narrative    Caffeine use     per [employer]    Exercises regularly       Objective     Vitals:    09/10/19 1303   BP: 132/80   BP Location: Left arm   Patient Position: Sitting   Cuff Size: Standard   Pulse: 62   SpO2: 99%   Weight: 60 4 kg (133 lb 4 oz)   Height: 5' 6" (1 676 m)     Wt Readings from Last 3 Encounters:   09/10/19 60 4 kg (133 lb 4 oz)   09/09/19 59 9 kg (132 lb)   09/03/19 60 2 kg (132 lb 12 8 oz)       Physical Exam   Constitutional: He appears well-developed and well-nourished  No distress  Neck: Neck supple  No thyromegaly present  Cardiovascular: Normal rate, regular rhythm, normal heart sounds and intact distal pulses  No murmur heard  Pulmonary/Chest: Effort normal and breath sounds normal  He has no wheezes  He has no rales  Musculoskeletal: He exhibits no edema  Lymphadenopathy:     He has no cervical adenopathy  Neurological: He displays a negative Romberg sign  Gait (unsteady upon initially standing up then stabilizes) abnormal    Psychiatric:   Anxious   Vitals reviewed        Pertinent Laboratory/Diagnostic Studies:  Lab Results   Component Value Date    GLUCOSE 114 10/13/2014    BUN 21 08/14/2019    CREATININE 0 91 08/14/2019    CALCIUM 8 7 08/14/2019     10/13/2014    K 3 9 08/14/2019    CO2 26 08/14/2019     08/14/2019     Lab Results   Component Value Date    ALT 26 08/14/2019    AST 15 08/14/2019    ALKPHOS 54 08/14/2019    BILITOT 0 5 10/13/2014       Lab Results   Component Value Date    WBC 5 72 08/14/2019    HGB 15 0 08/14/2019    HCT 45 0 08/14/2019    MCV 89 08/14/2019     08/14/2019     Lab Results   Component Value Date    TRIG 58 08/16/2018     Lab Results   Component Value Date    HDL 60 08/16/2018     Lab Results   Component Value Date    LDLCALC 129 (H) 08/16/2018     Results for orders placed or performed in visit on 08/13/19   TSH, 3rd generation with Free T4 reflex   Result Value Ref Range    TSH 3RD GENERATON 1 750 0 358 - 3 740 uIU/mL   Comprehensive metabolic panel   Result Value Ref Range    Sodium 136 136 - 145 mmol/L    Potassium 3 9 3 5 - 5 3 mmol/L    Chloride 104 100 - 108 mmol/L    CO2 26 21 - 32 mmol/L    ANION GAP 6 4 - 13 mmol/L    BUN 21 5 - 25 mg/dL    Creatinine 0 91 0 60 - 1 30 mg/dL    Glucose, Fasting 96 65 - 99 mg/dL    Calcium 8 7 8 3 - 10 1 mg/dL    AST 15 5 - 45 U/L    ALT 26 12 - 78 U/L    Alkaline Phosphatase 54 46 - 116 U/L    Total Protein 6 9 6 4 - 8 2 g/dL    Albumin 3 8 3 5 - 5 0 g/dL    Total Bilirubin 0 51 0 20 - 1 00 mg/dL    eGFR 85 ml/min/1 73sq m   CBC and differential   Result Value Ref Range    WBC 5 72 4 31 - 10 16 Thousand/uL    RBC 5 04 3 88 - 5 62 Million/uL    Hemoglobin 15 0 12 0 - 17 0 g/dL    Hematocrit 45 0 36 5 - 49 3 %    MCV 89 82 - 98 fL    MCH 29 8 26 8 - 34 3 pg    MCHC 33 3 31 4 - 37 4 g/dL    RDW 14 8 11 6 - 15 1 %    MPV 11 8 8 9 - 12 7 fL    Platelets 648 464 - 585 Thousands/uL    nRBC 0 /100 WBCs    Neutrophils Relative 54 43 - 75 %    Immat GRANS % 0 0 - 2 %    Lymphocytes Relative 30 14 - 44 %    Monocytes Relative 13 (H) 4 - 12 %    Eosinophils Relative 2 0 - 6 %    Basophils Relative 1 0 - 1 %    Neutrophils Absolute 3 10 1 85 - 7 62 Thousands/µL    Immature Grans Absolute 0 02 0 00 - 0 20 Thousand/uL    Lymphocytes Absolute 1 70 0 60 - 4 47 Thousands/µL    Monocytes Absolute 0 73 0 17 - 1 22 Thousand/µL    Eosinophils Absolute 0 13 0 00 - 0 61 Thousand/µL    Basophils Absolute 0 04 0 00 - 0 10 Thousands/µL         ALLERGIES:  No Known Allergies    Current Medications     Current Outpatient Medications   Medication Sig Dispense Refill    amoxicillin (AMOXIL) 500 mg capsule TAKE 4 CAPSULES BY MOUTH 1 HOUR PRIOR TO APPT  0    Ascorbic Acid (VITAMIN C) 500 MG CAPS Take 1 tablet by mouth daily      Cholecalciferol (VITAMIN D-3) 1000 units CAPS Take by mouth daily      vitamin E, tocopherol, (vitamin E) 400 units capsule Take by mouth daily      ALPRAZolam (XANAX) 0 5 mg tablet Take 1 tablet (0 5 mg total) by mouth every 4 (four) hours as needed for anxiety (Patient not taking: Reported on 9/9/2019) 20 tablet 0     No current facility-administered medications for this visit            Health Maintenance     Health Maintenance   Topic Date Due    DXA SCAN  1950    DTaP,Tdap,and Td Vaccines (1 - Tdap) 09/10/1969    INFLUENZA VACCINE  10/01/2019 (Originally 7/1/2019)    Fall Risk  09/20/2019    Medicare Annual Wellness Visit (AWV)  09/20/2019    Depression Screening PHQ  09/09/2020    BMI: Adult  09/09/2020    CRC Screening: Colonoscopy  12/15/2020    Hepatitis C Screening  Completed    Pneumococcal Vaccine: 65+ Years  Completed    Pneumococcal Vaccine: Pediatrics (0 to 5 Years) and At-Risk Patients (6 to 59 Years)  Aged Out    HEPATITIS B VACCINES  Aged [employer] History   Administered Date(s) Administered    INFLUENZA 09/20/2018    Influenza, high dose seasonal 0 5 mL 09/20/2018    Pneumococcal Conjugate 13-Valent 01/16/2017    Pneumococcal Polysaccharide PPV23 09/20/2018    Zoster 06/09/2005, 06/09/2005       Noni Solis PA-C  9/10/2019 1:58 PM  [facility]

## 2019-09-10 NOTE — ASSESSMENT & PLAN NOTE
Patient continues with anxiety but has been having significant dizziness with the Zoloft  Will stop the Zoloft and see how he does over the next few days  Will look for resolution of the dizziness and also reassess level of anxiety  Patient was asked to call with an update in 3 days  He was told to take a 1/2 tablet of the Xanax if needed for relief of severe anxiety  He should not drive after taking this medication though

## 2019-09-13 ENCOUNTER — TELEPHONE (OUTPATIENT)
Dept: FAMILY MEDICINE CLINIC | Facility: CLINIC | Age: 69
End: 2019-09-13

## 2019-09-13 NOTE — TELEPHONE ENCOUNTER
----- Message from Ophelia Malhotra PA-C sent at 9/10/2019  2:02 PM EDT -----  Regarding: follow-up on anxiety  Please call the patient to follow-up on his anxiety and dizziness if he has not called us by Friday afternoon with an update  He was directed on Tuesday to stop taking Zoloft to see if the dizziness resolved  We had discussed considering a new daily medication for anxiety if needed based upon how his anxiety level is off of the Zoloft

## 2019-09-13 NOTE — TELEPHONE ENCOUNTER
Spoke with pt this morning, pt stopped his zoloft on Tuesday 09/10/2019,  Pt is doing better and he does not want try any other medication

## 2019-10-07 ENCOUNTER — TELEPHONE (OUTPATIENT)
Dept: FAMILY MEDICINE CLINIC | Facility: CLINIC | Age: 69
End: 2019-10-07

## 2019-10-07 ENCOUNTER — APPOINTMENT (OUTPATIENT)
Dept: LAB | Facility: CLINIC | Age: 69
End: 2019-10-07
Payer: COMMERCIAL

## 2019-10-07 ENCOUNTER — OFFICE VISIT (OUTPATIENT)
Dept: FAMILY MEDICINE CLINIC | Facility: CLINIC | Age: 69
End: 2019-10-07
Payer: COMMERCIAL

## 2019-10-07 VITALS
SYSTOLIC BLOOD PRESSURE: 128 MMHG | RESPIRATION RATE: 18 BRPM | HEART RATE: 68 BPM | HEIGHT: 66 IN | TEMPERATURE: 97.4 F | DIASTOLIC BLOOD PRESSURE: 82 MMHG | BODY MASS INDEX: 21.65 KG/M2 | WEIGHT: 134.7 LBS | OXYGEN SATURATION: 97 %

## 2019-10-07 DIAGNOSIS — Z23 NEED FOR INFLUENZA VACCINATION: ICD-10-CM

## 2019-10-07 DIAGNOSIS — Z23 NEED FOR TDAP VACCINATION: ICD-10-CM

## 2019-10-07 DIAGNOSIS — Z12.5 SCREENING PSA (PROSTATE SPECIFIC ANTIGEN): ICD-10-CM

## 2019-10-07 DIAGNOSIS — F41.1 GENERALIZED ANXIETY DISORDER: Primary | ICD-10-CM

## 2019-10-07 DIAGNOSIS — Z23 NEED FOR SHINGLES VACCINE: ICD-10-CM

## 2019-10-07 PROBLEM — M65.30 ACQUIRED TRIGGER FINGER: Status: RESOLVED | Noted: 2018-09-15 | Resolved: 2019-10-07

## 2019-10-07 LAB — PSA SERPL-MCNC: 3.8 NG/ML (ref 0–4)

## 2019-10-07 PROCEDURE — G0103 PSA SCREENING: HCPCS | Performed by: INTERNAL MEDICINE

## 2019-10-07 PROCEDURE — 99213 OFFICE O/P EST LOW 20 MIN: CPT | Performed by: INTERNAL MEDICINE

## 2019-10-07 PROCEDURE — 36415 COLL VENOUS BLD VENIPUNCTURE: CPT | Performed by: INTERNAL MEDICINE

## 2019-10-07 PROCEDURE — G0008 ADMIN INFLUENZA VIRUS VAC: HCPCS

## 2019-10-07 PROCEDURE — 90662 IIV NO PRSV INCREASED AG IM: CPT

## 2019-10-07 NOTE — TELEPHONE ENCOUNTER
Attempted to call pt at [phone] several times to let him know his two scripts, TDAP BOOSTER and SHINGRIX, are up front ready for  but his phone was busy

## 2019-10-07 NOTE — PROGRESS NOTES
Assessment/Plan:     Diagnoses and all orders for this visit:    Generalized anxiety disorder  -Feels much better and would like to remain off of medication for now  Will monitor  Need for influenza vaccination  -     influenza vaccine, 6290-2411, high-dose, PF 0 5 mL (FLUZONE HIGH-DOSE)    Screening PSA (prostate specific antigen)  -     PSA, Total Screen  Would like a PSA done as well  Need for Tdap vaccination  -     Cancel: TDAP VACCINE GREATER THAN OR EQUAL TO 8YO IM; Future  -     tetanus-diphtheria-acellular pertussis ([address]) injection; Inject 0 5 mL into a muscle once for 1 dose    Need for shingles vaccine  -     Cancel: Zoster Vaccine Recombinant IM; Future  -     Zoster Vac Recomb Adjuvanted (SHINGRIX) 50 MCG/0 5ML SUSR; Inject 0 5 mL into a muscle once for 1 dose Repeat dose in 2 to 6 months    Other orders  -     Cancel: PSA, Total Screen        Subjective:      Patient ID: Louisa Reardon is a 68 y o  male  Luisito No is here today for a short follow up on his anxiety  Recent notes were reviewed  He was taken off of Zoloft and reports that dizziness feels much better  He reports feeling that his anxiety is better as well and has not had to take the Xanax  He would like to continue to remain off of medication  He is largely without complaints today  The following portions of the patient's history were reviewed and updated as appropriate: allergies, current medications, past family history, past medical history, past social history, past surgical history and problem list     Review of Systems   Constitutional: Negative for chills  Respiratory: Negative for cough, chest tightness and shortness of breath  Cardiovascular: Negative for chest pain  Gastrointestinal: Negative for abdominal pain, diarrhea, nausea and vomiting  Psychiatric/Behavioral: Negative for dysphoric mood and sleep disturbance  The patient is not nervous/anxious            Objective:      /82   Pulse 68   Temp Sharda Villatoro ) 97 4 °F (36 3 °C)   Resp 18   Ht 5' 6" (1 676 m)   Wt 61 1 kg (134 lb 11 2 oz)   SpO2 97%   BMI 21 74 kg/m²          Physical Exam   Constitutional: He is oriented to person, place, and time  He appears well-developed and well-nourished  No distress  Cardiovascular: Normal rate, regular rhythm and normal heart sounds  Pulmonary/Chest: Effort normal and breath sounds normal    Neurological: He is alert and oriented to person, place, and time  Skin: Skin is warm and dry  He is not diaphoretic  Psychiatric: He has a normal mood and affect   His behavior is normal  Judgment and thought content normal

## 2019-10-08 ENCOUNTER — TELEPHONE (OUTPATIENT)
Dept: FAMILY MEDICINE CLINIC | Facility: CLINIC | Age: 69
End: 2019-10-08

## 2019-10-08 NOTE — TELEPHONE ENCOUNTER
Pt received his flu on 10/07 later on that day pt stated that he felt a rush of heat into his forehead then the white of his eye's became red , he got a low grade temp of 99 6 Tobias blunt's to thinks this is all from the flu shot I did tell him that I don't seem to think so but I will ask you    Pt does feel better today but the white of his eyes is still red pt wants to know what should he do for this

## 2019-10-08 NOTE — TELEPHONE ENCOUNTER
Its likely not all from the flu shot  Sometimes people will note a low grade fever but I would just watch symptoms for now

## 2019-11-07 NOTE — PROGRESS NOTES
FAMILY PRACTICE ACUTE OFFICE VISIT  [facility] - [facility] PRIMARY CARE       NAME: Eugenia Bennett  AGE: 68 y o  SEX: male       : 1950        MRN: [id number]    DATE: 2019  TIME: 5:51 PM    Assessment and Plan     Problem List Items Addressed This Visit     None      Visit Diagnoses     Benign paroxysmal positional vertigo, unspecified laterality    -  Primary    Not interested in meclizine  Try repositioning maneuvers at home  Referred to PT  Consider ENT if persists  Relevant Orders    Ambulatory referral to Physical Therapy              Chief Complaint     Chief Complaint   Patient presents with    Dizziness       History of Present Illness   Eugenia Bennett is a 68y o -year-old male who presents for dizziness  Patient presents today for evaluation of dizziness  Of note, he was having difficulty with dizziness a few months ago and it was thought to be possibly a side effect of Zoloft that he had recently been started on  He is now no longer taking Zoloft or alprazolam which he had previously also had available for use  He has not been using them for the last 2 months  He felt better about a week after stopping them  He notes that he had a flu shot a month ago on 10/7  He reports that he went to  bag of garbage that night and had heat rush to head and notes that the whites of his eyes were bloody looking  He reports that he had blurry vision and had trouble sleeping but the next day it resolved  He has not had any recurrence  He has since been exercising without problem  He saw the ophthalmologist 2 weeks ago and had full exam  He notes that he has had dizziness and nausea  He notes that it happens multiple times a day as well as times without the glasses or contacts  He reports that he has the episodes for a variety of time frames but usually brief  He is not spinning but does feel off balance   He is concerned because this never happened prior to trying the Zoloft and Xanax  Dizziness   Associated symptoms include nausea  Pertinent negatives include no chest pain, congestion, headaches, numbness or weakness  Review of Systems   Review of Systems   HENT: Positive for hearing loss (chronic), rhinorrhea (chronic for years in the morning primarily) and tinnitus (chronic for 35-40 years)  Negative for congestion, ear discharge, ear pain and postnasal drip  Eyes: Negative for visual disturbance (no recent change)  Respiratory: Negative for shortness of breath  Cardiovascular: Negative for chest pain and palpitations  Gastrointestinal: Positive for nausea  Musculoskeletal: Negative for gait problem  Neurological: Positive for dizziness  Negative for syncope, weakness, numbness and headaches  Psychiatric/Behavioral: The patient is not nervous/anxious          Active Problem List     Patient Active Problem List   Diagnosis    Arthralgia of multiple joints    BPH with obstruction/lower urinary tract symptoms    DDD (degenerative disc disease), cervical    Elevated PSA    Mixed hyperlipidemia    Nocturia    Subarachnoid hemorrhage (HCC)    Vocal cord dysfunction    PMR (polym[facility]algia rheumatica) ([facility])    Bunion    Chondromalacia patellae    Abnormal C-reactive protein    Crystal arthropathy of ankle and foot    ESR raised    Hip pain    Osteoarthritis of knee    Shoulder joint pain    Disability of walking    Kidney stone    Vocal cord paralysis    Generalized anxiety disorder         Social History:  Social History     Socioeconomic History    Marital status:      Spouse name: Not on file    Number of children: Not on file    Years of education: Not on file    Highest education level: Not on file   Occupational History    Not on file   Social Needs    Financial resource strain: Not on file    Food insecurity:     Worry: Not on file     Inability: Not on file    Transportation needs:     Medical: Not on file Non-medical: Not on file   Tobacco Use    Smoking status: Never Smoker    Smokeless tobacco: Never Used   Substance and Sexual Activity    Alcohol use: Yes     Comment: Drinks socially; "a few beers a week"   Drug use: No    Sexual activity: Not on file   Lifestyle    Physical activity:     Days per week: Not on file     Minutes per session: Not on file    Stress: Not on file   Relationships    Social connections:     Talks on phone: Not on file     Gets together: Not on file     Attends [employer] service: Not on file     Active member of club or organization: Not on file     Attends meetings of clubs or organizations: Not on file     Relationship status: Not on file    Intimate partner violence:     Fear of current or ex partner: Not on file     Emotionally abused: Not on file     Physically abused: Not on file     Forced sexual activity: Not on file   Other Topics Concern    Not on file   Social History Narrative    Caffeine use     per [employer]    Exercises regularly       Objective     Vitals:    11/08/19 1458   BP: 134/80   BP Location: Left arm   Patient Position: Sitting   Cuff Size: Standard   Pulse: 64   Temp: (!) 97 2 °F (36 2 °C)   SpO2: 97%   Weight: 65 1 kg (143 lb 8 oz)   Height: 5' 6" (1 676 m)     Wt Readings from Last 3 Encounters:   11/08/19 65 1 kg (143 lb 8 oz)   10/07/19 61 1 kg (134 lb 11 2 oz)   09/10/19 60 4 kg (133 lb 4 oz)       Physical Exam   Constitutional: He appears well-developed and well-nourished  No distress  Eyes: Pupils are equal, round, and reactive to light  Right eye exhibits no discharge  Left eye exhibits no discharge  Right conjunctiva is injected (mild)  Left conjunctiva is injected (mild)  Neck: Normal range of motion  Neck supple  No thyromegaly present  Cardiovascular: Normal rate, regular rhythm, normal heart sounds and intact distal pulses  No murmur heard  Pulmonary/Chest: Effort normal and breath sounds normal  He has no wheezes  He has no rales  Audible upper airway constriction noted from vocal cord paralysis   Musculoskeletal: Normal range of motion  He exhibits no edema  Lymphadenopathy:     He has no cervical adenopathy  Neurological: He is alert  He has normal strength  No sensory deficit  He displays a negative Romberg sign  Coordination and gait normal    Positive Jeannette-Hallpike bilaterally but worse on the right   Skin: Skin is warm and dry  Psychiatric: He has a normal mood and affect  Vitals reviewed  ALLERGIES:  Allergies   Allergen Reactions    Zoloft [Sertraline]        Current Medications     Current Outpatient Medications   Medication Sig Dispense Refill    amoxicillin (AMOXIL) 500 mg capsule TAKE 4 CAPSULES BY MOUTH 1 HOUR PRIOR TO APPT  0    Ascorbic Acid (VITAMIN C) 500 MG CAPS Take 1 tablet by mouth daily      Cholecalciferol (VITAMIN D-3) 1000 units CAPS Take by mouth daily      vitamin E, tocopherol, (vitamin E) 400 units capsule Take by mouth daily       No current facility-administered medications for this visit            Oksana De La Fuente PA-C  11/8/2019 5:51 PM  [facility]

## 2019-11-08 ENCOUNTER — OFFICE VISIT (OUTPATIENT)
Dept: FAMILY MEDICINE CLINIC | Facility: CLINIC | Age: 69
End: 2019-11-08
Payer: COMMERCIAL

## 2019-11-08 VITALS
BODY MASS INDEX: 23.06 KG/M2 | OXYGEN SATURATION: 97 % | HEART RATE: 64 BPM | SYSTOLIC BLOOD PRESSURE: 134 MMHG | TEMPERATURE: 97.2 F | HEIGHT: 66 IN | DIASTOLIC BLOOD PRESSURE: 80 MMHG | WEIGHT: 143.5 LBS

## 2019-11-08 DIAGNOSIS — H81.10 BENIGN PAROXYSMAL POSITIONAL VERTIGO, UNSPECIFIED LATERALITY: Primary | ICD-10-CM

## 2019-11-08 PROCEDURE — 99213 OFFICE O/P EST LOW 20 MIN: CPT | Performed by: PHYSICIAN ASSISTANT

## 2019-11-13 ENCOUNTER — EVALUATION (OUTPATIENT)
Dept: PHYSICAL THERAPY | Facility: CLINIC | Age: 69
End: 2019-11-13
Payer: COMMERCIAL

## 2019-11-13 DIAGNOSIS — H81.10 BENIGN PAROXYSMAL POSITIONAL VERTIGO, UNSPECIFIED LATERALITY: Primary | ICD-10-CM

## 2019-11-13 DIAGNOSIS — R42 DIZZINESS: ICD-10-CM

## 2019-11-13 PROCEDURE — 97161 PT EVAL LOW COMPLEX 20 MIN: CPT | Performed by: PHYSICAL THERAPIST
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
## 2019-11-14 ENCOUNTER — EVALUATION (OUTPATIENT)
Dept: PHYSICAL THERAPY | Facility: REHABILITATION | Age: 69
End: 2019-11-14
Payer: COMMERCIAL

## 2019-11-14 DIAGNOSIS — R42 DIZZINESS: ICD-10-CM

## 2019-11-14 DIAGNOSIS — H81.10 BPPV (BENIGN PAROXYSMAL POSITIONAL VERTIGO), UNSPECIFIED LATERALITY: Primary | ICD-10-CM

## 2019-11-14 PROCEDURE — 97112 NEUROMUSCULAR REEDUCATION: CPT
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
## 2019-11-21 ENCOUNTER — OFFICE VISIT (OUTPATIENT)
Dept: PHYSICAL THERAPY | Facility: REHABILITATION | Age: 69
End: 2019-11-21
Payer: COMMERCIAL

## 2019-11-21 DIAGNOSIS — H81.10 BENIGN PAROXYSMAL POSITIONAL VERTIGO, UNSPECIFIED LATERALITY: ICD-10-CM

## 2019-11-21 DIAGNOSIS — R42 DIZZINESS: Primary | ICD-10-CM

## 2019-11-21 DIAGNOSIS — H81.10 BPPV (BENIGN PAROXYSMAL POSITIONAL VERTIGO), UNSPECIFIED LATERALITY: ICD-10-CM

## 2019-11-21 PROCEDURE — 97112 NEUROMUSCULAR REEDUCATION: CPT

## 2019-11-21 NOTE — PROGRESS NOTES
Daily Note     Today's date: 2019  Patient name: Jamir Hsieh  : 1950  MRN: [id number]  Referring provider: Aren Sanz , *  Dx:   Encounter Diagnosis     ICD-10-CM    1  Dizziness R42    2  BPPV (benign paroxysmal positional vertigo), unspecified laterality H81 10    3  Benign paroxysmal positional vertigo, unspecified laterality H81 10      Start Time: 1108  Stop Time: 1150  Total time in clinic (min): 42 minutes     Subjective: Patient reports he is continuing to have the dizziness, only with certain movements  He went to his dermatologist and was telling him about what is going on  He is worried that there could be something else since he has never had dizziness before        Objective: See treatment diary below    VBI screening questions/test:   - negative for screening questions (diplopia, drop attacks, vertigo, dysphagia, dysarthria, numbness, nystagmus, headache, difficulty walking/ataxia)  - negative for testing (performed test in sitting)     Head shake test with goggles: (-) for nystagmus, (+) for symptoms after test     Orthostatic BP testing:   Sittin/82 mmHg  Standin/84 mmHg  Patient denies lightheadedness    Precautions: N/A    Manual                                                     Exercise Diary  19       Static balance FT EO with HT/HN 3 x 30 sec     Semi tandem EC on foam 3 x 30 sec        Dynamic balance Walking with HT/HN 2 x 60 ft each       Vestibular habituation exercises   VOR with ambulation    VOR cancel                                                                                                                                       HEP  FT firm with HT/HN 3 x 30 sec each    Walking with HT/HN     Education on vestibular disorders and treatment with physical therapy           Modalities                                     Assessment:  Performed VBI screening and BP assessment at start of session to rule out cardiovascular cause of dizziness, as patient was very concerned after researching possible causes for dizziness  Also performed head shake test to assess for vestibular hypofunction, noted no nystagmus with testing today however patient reported symptoms following test   Patient's symptoms still appear to be consistent with a hypofunction, with the mechanism of injury, history of hearing loss/tinnitius, and symptoms with exercises stimulating the vestibular system  Patient presents today with continued dizziness with static/dynamic balance exercises while challenging the vestibular system with EC/head turning activities  Spent time educating patient on vestibular disorders, specifically vestibular hypofunction and treatment  Patient would benefit from continued PT to improve balance, vestibular integration, and decrease dizziness symptoms especially in stimulating environments  Will continue to progress treatment as tolerated, if symptoms do not resolve in a few weeks will refer to ENT for further testing  Plan: Progress treatment as tolerated  Continue with vestibular habituation exercises  Progress static/dynamic balance

## 2019-11-27 ENCOUNTER — OFFICE VISIT (OUTPATIENT)
Dept: PHYSICAL THERAPY | Facility: REHABILITATION | Age: 69
End: 2019-11-27
Payer: COMMERCIAL

## 2019-11-27 DIAGNOSIS — R42 DIZZINESS: ICD-10-CM

## 2019-11-27 DIAGNOSIS — H81.10 BENIGN PAROXYSMAL POSITIONAL VERTIGO, UNSPECIFIED LATERALITY: ICD-10-CM

## 2019-11-27 DIAGNOSIS — H81.10 BPPV (BENIGN PAROXYSMAL POSITIONAL VERTIGO), UNSPECIFIED LATERALITY: Primary | ICD-10-CM

## 2019-11-27 PROCEDURE — 97112 NEUROMUSCULAR REEDUCATION: CPT

## 2019-11-27 NOTE — PROGRESS NOTES
Daily Note     Today's date: 2019  Patient name: Jamir Hsieh  : 1950  MRN: [id number]  Referring provider: Aren Sanz , *  Dx:   Encounter Diagnosis     ICD-10-CM    1  BPPV (benign paroxysmal positional vertigo), unspecified laterality H81 10    2  Dizziness R42    3  Benign paroxysmal positional vertigo, unspecified laterality H81 10      Start Time: 9679  Stop Time: 8488  Total time in clinic (min): 50 minutes     Subjective: Patient reports he is doing okay today  No changes in the dizziness since last session  He has been doing his exercises at home every day  He notices dizziness with the exercises, however it subsides quickly  Objective: See treatment diary below      Precautions: N/A    Manual                                                     Exercise Diary  19      Static balance FT EO with HT/HN 3 x 30 sec     Semi tandem EC on foam 3 x 30 sec  FT EO with HT/HN in front of mirror - 3 x 30 sec     Semi tandem EC on foam 3 x 30 sec      Dynamic balance Walking with HT/HN 2 x 60 ft each Walking with HT/HN (performed on TM today)     Tandem walking fwd/back 60 ft each    Tandem walking fwd with HT 40 ft x 2       Vestibular habituation exercises   VOR with ambulation 60 ft x 2 each HT/HN     VOR cancel with ambulation 60 ft x 2 each HT/HN                           TM   1 8 mph,    5 % incline 11 minutes (last 6 minutes with HT/HN for 30 seconds)                                                                                                               HEP  FT firm with HT/HN 3 x 30 sec each    Walking with HT/HN     Education on vestibular disorders and treatment with physical therapy Added VOR x 1 with busy background (mirror) 3 x 30 sec each HT/HN           Modalities                                     Assessment:  Patient tolerated session well today    Performed TM walking with HT/HN today and patient tolerated well with mild increase in dizziness reported  Patient demonstrated improvements in dizziness symptoms compared to previous session, as he did not request a break after static balance exercises today  Patient reported increased dizziness with VOR x 1 and VOR cancel exercises with ambulation, however dizziness subsided very quickly following  Added VOR exercises to HEP and patient is very compliant with HEP over past week  Patient would benefit from continued PT to improve balance, vestibular integration, and decrease dizziness symptoms especially in stimulating environments  Plan: Progress treatment as tolerated  Continue with vestibular habituation exercises  Progress static/dynamic balance  Add VR training at next visit

## 2019-12-04 ENCOUNTER — OFFICE VISIT (OUTPATIENT)
Dept: PHYSICAL THERAPY | Facility: REHABILITATION | Age: 69
End: 2019-12-04
Payer: COMMERCIAL

## 2019-12-04 DIAGNOSIS — H81.10 BENIGN PAROXYSMAL POSITIONAL VERTIGO, UNSPECIFIED LATERALITY: ICD-10-CM

## 2019-12-04 DIAGNOSIS — H81.10 BPPV (BENIGN PAROXYSMAL POSITIONAL VERTIGO), UNSPECIFIED LATERALITY: Primary | ICD-10-CM

## 2019-12-04 DIAGNOSIS — R42 DIZZINESS: ICD-10-CM

## 2019-12-04 PROCEDURE — 97112 NEUROMUSCULAR REEDUCATION: CPT

## 2019-12-04 NOTE — PROGRESS NOTES
Daily Note/Progress Update    Today's date: 2019  Patient name: Kiera Guadarrama  : 1950  MRN: [id number]  Referring provider: Ulises nAgelo , *  Dx:   Encounter Diagnosis     ICD-10-CM    1  BPPV (benign paroxysmal positional vertigo), unspecified laterality H81 10    2  Dizziness R42    3  Benign paroxysmal positional vertigo, unspecified laterality H81 10      Start Time: 1050  Stop Time: 1140  Total time in clinic (min): 50 minutes   INTERVENTION COMMENTS:  Diagnosis: BPPV (benign paroxysmal positional vertigo), unspecified laterality [H81 10]  Insurance: Payor: [address] REP / Plan: [employer] PPO  [address] REP / Product Type: Medicare PPO /   4 of 24 visits, Re-assessment performed today    Subjective: Patient reports he has an appointment with the audiologist on Friday  He notices over the past few days the dizziness has been getting much better  He has done the exercises everyday  He reports increased dizziness with his exercises over the past week, however the last few days  He went to the grocery store on Saturday and noticed the dizziness has been better than before  Objective: See treatment diary below    Functional Gait Assessment  Initial Eval (): 23/30    3/3 Gait level surface (4 8 sec)   3/3 Change in gait speed   2/3 Gait with horizontal head turns  3/3 Gait with vertical head turns  2/3 Gait and pivot turn  2/3 Step over obstacle  2/3 Gait with narrow base of support  2/3 Gait with eyes closed  3/3 Ambulating backwards  3/3 Steps   -  Total score  (less than 22/30 indicates increased risk of fall)        Initial Eval ():    Tandem EO: 16 seconds, 15 seconds  Tandem EC: 10 seconds, 2 seconds     19:   Tandem EO: 30 seconds  Tandem EC: 25 seconds     Precautions: N/A    Manual                                                     Exercise Diary  19     Static balance FT EO with HT/HN 3 x 30 sec     Semi tandem EC on foam 3 x 30 sec  FT EO with HT/HN in front of mirror - 3 x 30 sec     Semi tandem EC on foam 3 x 30 sec FT EO foam with HT/HN in front of mirror - 2 x 30 sec each     Semi tandem EC on foam 2 x 30 sec      Dynamic balance Walking with HT/HN 2 x 60 ft each Walking with HT/HN (performed on TM today)     Tandem walking fwd/back 60 ft each    Tandem walking fwd with HT 40 ft x 2  Walking with HT/HN (performed on TM)     Tandem walking fwd with HT/HN 2 x 30 ft each         Vestibular habituation exercises   VOR with ambulation 60 ft x 2 each HT/HN     VOR cancel with ambulation 60 ft x 2 each HT/HN      VOR with ambulation 30 ft x 4 each HT/HN     VOR cancel with ambulation 30 ft x 2 each HT/HN     Walking while tossing ball up and down with head movements 2 x 30 ft      Optokinetic  training    Standing on foam x 2 minutes              TM   1 8 mph,    5 % incline 11 minutes (last 6 minutes with HT/HN for 30 seconds)  2 0 - 2 4 mph, 1% incline, x 12 minutes (with HT/HN for 45 seconds throughout)                                                                                                              HEP  FT firm with HT/HN 3 x 30 sec each    Walking with HT/HN     Education on vestibular disorders and treatment with physical therapy Added VOR x 1 with busy background (mirror) 3 x 30 sec each HT/HN           Modalities                                     Assessment:  Patient tolerated session well today  Patient is reporting mild improvements in dizziness over the past few days with his exercises as well as walking through the grocery store  Re-assessed FGA and static balance today  Patient demonstrated improvements with static and dynamic balance with testing today, with reduction in dizziness symptoms throughout testing  Patient still with reports of dizziness and unsteadiness during VOR and VOR cancellation exercises with ambulation    Also noticing reduction of dizziness symptoms with EC and head turning balance activities, indicating improvement in vestibular habituation  Patient continues to demonstrate unsteadiness Patient is very active and has been very compliant with HEP  Anticipate patient will only require a few more visits as long as dizziness symptoms continue to subside  Patient will benefit from continued PT for 1x a week for 2-3 more weeks to improve dynamic balance and vestibular integration to decrease frequency of dizziness/off balance symptoms with daily activities  STG Goals: (2 weeks)  1  Patient will be compliant with HEP  - MET   2  Patient will report reduced onset of dizziness to only 2x a week with walking and performing household chores  - Not met - progressing  3  Patient will improve static balance by improving tandem balance EO to 30 seconds  - MET      LTG Goals: (4-6 weeks)  1  Patient will report no dizziness when grocery shopping or ambulating in stimulating/busy environments  Not Met - progressing  2  Patient will perform aerobic exercise 3-5 x a week without increase in dizziness symptoms  MET   3  Patient will improve dynamic balance per increasing FGA score to at least 26/30  Not met - Progressing  Plan: Progress treatment as tolerated  Progress VR training and vestibular habituation exercises at future visits  Decrease UE support on [facility] [place], PT  12/4/19

## 2019-12-11 ENCOUNTER — APPOINTMENT (OUTPATIENT)
Dept: PHYSICAL THERAPY | Facility: REHABILITATION | Age: 69
End: 2019-12-11
Payer: COMMERCIAL

## 2019-12-13 ENCOUNTER — OFFICE VISIT (OUTPATIENT)
Dept: PHYSICAL THERAPY | Facility: REHABILITATION | Age: 69
End: 2019-12-13
Payer: COMMERCIAL

## 2019-12-13 DIAGNOSIS — H81.10 BPPV (BENIGN PAROXYSMAL POSITIONAL VERTIGO), UNSPECIFIED LATERALITY: Primary | ICD-10-CM

## 2019-12-13 DIAGNOSIS — H81.10 BENIGN PAROXYSMAL POSITIONAL VERTIGO, UNSPECIFIED LATERALITY: ICD-10-CM

## 2019-12-13 DIAGNOSIS — R42 DIZZINESS: ICD-10-CM

## 2019-12-13 PROCEDURE — 97112 NEUROMUSCULAR REEDUCATION: CPT

## 2019-12-13 NOTE — PROGRESS NOTES
Daily Note    Today's date: 2019  Patient name: Jan Clark  : 1950  MRN: [id number]  Referring provider: Milena Singletary , *  Dx:   Encounter Diagnosis     ICD-10-CM    1  BPPV (benign paroxysmal positional vertigo), unspecified laterality H81 10    2  Dizziness R42    3  Benign paroxysmal positional vertigo, unspecified laterality H81 10      Start Time: 1250  Stop Time: 1345  Total time in clinic (min): 55 minutes     INTERVENTION COMMENTS:  Diagnosis: BPPV (benign paroxysmal positional vertigo), unspecified laterality [H81 10]  Insurance: Payor: [address] REP / Plan: [employer] PPO  [address] REP / Product Type: Medicare PPO /   5 of 24 visits    Subjective: Patient reports he is getting very anxious about his dizziness  He is worried he will not be able to ski this winter  He went to the audiologist and they are recommending hearing aids  He has not noticed any increased dizziness with daily activities, besides walking through [facility] last week  He does get occasional dizziness with PT exercises, but has been doing them religiously       Objective: See treatment diary below    Precautions: N/A    Manual                                                     Exercise Diary  19    Static balance FT EO with HT/HN 3 x 30 sec     Semi tandem EC on foam 3 x 30 sec  FT EO with HT/HN in front of mirror - 3 x 30 sec     Semi tandem EC on foam 3 x 30 sec FT EO foam with HT/HN in front of mirror - 2 x 30 sec each     Semi tandem EC on foam 2 x 30 sec  Semi-tandem on foam EO with HT/HN - 2 x 45 sec each     FT EC foam with HT/HN 45 sec each     Dynamic balance Walking with HT/HN 2 x 60 ft each Walking with HT/HN (performed on TM today)     Tandem walking fwd/back 60 ft each    Tandem walking fwd with HT 40 ft x 2  Walking with HT/HN (performed on TM)     Tandem walking fwd with HT/HN 2 x 30 ft each     Walking with HT/HN (performed on TM)         Vestibular habituation exercises   VOR with ambulation 60 ft x 2 each HT/HN     VOR cancel with ambulation 60 ft x 2 each HT/HN      VOR with ambulation 30 ft x 4 each HT/HN     VOR cancel with ambulation 30 ft x 2 each HT/HN     Walking while tossing ball up and down with head movements 2 x 30 ft  VOR cancel with ambulation 30 ft x 2 each HT/HN     VOR with ambulation 30 ft x 2 fwd/backwards    Tandem walking while tossing ball with therapist 2 x 30 ft     Optokinetic  training    Standing on foam x 2 minutes  FT VOR (HT/HN) with optokinetic background  (1 min each)             TM   1 8 mph,    5 % incline 11 minutes (last 6 minutes with HT/HN for 30 seconds)  2 0 - 2 4 mph, 1% incline, x 12 minutes (with HT/HN for 45 seconds throughout)  2 2-2 4 mph,  2% incline, x 12 minutes (with HT/HN 1 min each x 2)                                                                                                             HEP  FT firm with HT/HN 3 x 30 sec each    Walking with HT/HN     Education on vestibular disorders and treatment with physical therapy Added VOR x 1 with busy background (mirror) 3 x 30 sec each HT/HN   Progressed VOR with ambulation         Modalities                                     Assessment:  Patient tolerated session well today  Patient reports increased anxiety and depression today regarding his dizziness and not being able to ski in the winter  Progressed VOR and static/dynamic balance exercises today  Patient tolerated well, reporting increased dizziness with EC with HT/HN and with backwards ambulation with VOR  Patient continually reporting frustration with his physical capabilities, as he is not as physically fit as he was years ago  Spent time educating patient on vestibular disorders and rehabilitation as well as progress made with PT so far  Discussed making appointment with neurologist to discuss symptom management as well as discuss referral to cognitive behavioral therapy     Patient will benefit from continued PT for 1x a week for 2-3 more weeks to improve dynamic balance and vestibular integration to decrease frequency of dizziness/off balance symptoms with daily activities  Recommending referral to neurology and cognitive behavioral therapy  Plan: Progress treatment as tolerated  Progress VOR and dynamic balance exercises            Saadia Cartwright, PT  12/13/19

## 2019-12-18 ENCOUNTER — OFFICE VISIT (OUTPATIENT)
Dept: PHYSICAL THERAPY | Facility: REHABILITATION | Age: 69
End: 2019-12-18
Payer: COMMERCIAL

## 2019-12-18 DIAGNOSIS — H81.10 BPPV (BENIGN PAROXYSMAL POSITIONAL VERTIGO), UNSPECIFIED LATERALITY: Primary | ICD-10-CM

## 2019-12-18 DIAGNOSIS — H81.10 BENIGN PAROXYSMAL POSITIONAL VERTIGO, UNSPECIFIED LATERALITY: ICD-10-CM

## 2019-12-18 DIAGNOSIS — R42 DIZZINESS: ICD-10-CM

## 2019-12-18 PROCEDURE — 97112 NEUROMUSCULAR REEDUCATION: CPT

## 2019-12-18 NOTE — PROGRESS NOTES
Daily Note    Today's date: 2019  Patient name: Colten Busby  : 1950  MRN: [id number]  Referring provider: Kashmir Linder , *  Dx:   Encounter Diagnosis     ICD-10-CM    1  BPPV (benign paroxysmal positional vertigo), unspecified laterality H81 10    2  Dizziness R42    3  Benign paroxysmal positional vertigo, unspecified laterality H81 10      Start Time: 958  Stop Time: 104  Total time in clinic (min): 44 minutes     INTERVENTION COMMENTS:  Diagnosis: BPPV (benign paroxysmal positional vertigo), unspecified laterality [H81 10]  Insurance: Payor: [address] [place] [address] REP / Plan: [employer] PPO  [address]  REP / Product Type: Medicare PPO /   6 of 24 visits    Subjective: Patient reports he did his exercises today and is noticing an increase in dizziness this morning  Otherwise, he has been doing well  He went to the grocery store over the weekend and noticed an improvement in dizziness symptoms compared to previous time       Objective: See treatment diary below    Precautions: N/A    Manual                                                     Exercise Diary  19   Static balance FT EO with HT/HN 3 x 30 sec     Semi tandem EC on foam 3 x 30 sec  FT EO with HT/HN in front of mirror - 3 x 30 sec     Semi tandem EC on foam 3 x 30 sec FT EO foam with HT/HN in front of mirror - 2 x 30 sec each     Semi tandem EC on foam 2 x 30 sec  Semi-tandem on foam EO with HT/HN - 2 x 45 sec each     FT EC foam with HT/HN 45 sec each  Semi-tandem on foam EO - 2 x 45 sec each      FT EC foam with HT/HN - 2 x 45 sec each   Dynamic balance Walking with HT/HN 2 x 60 ft each Walking with HT/HN (performed on TM today)     Tandem walking fwd/back 60 ft each    Tandem walking fwd with HT 40 ft x 2  Walking with HT/HN (performed on TM)     Tandem walking fwd with HT/HN 2 x 30 ft each     Walking with HT/HN (performed on TM)      Walking with HT/HN (performed on TM)    Vestibular habituation exercises   VOR with ambulation 60 ft x 2 each HT/HN     VOR cancel with ambulation 60 ft x 2 each HT/HN      VOR with ambulation 30 ft x 4 each HT/HN     VOR cancel with ambulation 30 ft x 2 each HT/HN     Walking while tossing ball up and down with head movements 2 x 30 ft  VOR cancel with ambulation 30 ft x 2 each HT/HN     VOR with ambulation 30 ft x 2 fwd/backwards    Tandem walking while tossing ball with therapist 2 x 30 ft  VOR cancel with ambulation 30 ft x 2 HT/HN both fwd/backwards      VOR with ambulation 30 ft x 2 fwd/backwards    Tandem walking while tossing ball with therapist 2 x 30 ft     FT VOR HT/HN with moving background (windown facing outside) 2 x 45 sec     Optokinetic  training    Standing on foam x 2 minutes  FT VOR (HT/HN) with optokinetic background  (1 min each)             TM   1 8 mph,    5 % incline 11 minutes (last 6 minutes with HT/HN for 30 seconds)  2 0 - 2 4 mph, 1% incline, x 12 minutes (with HT/HN for 45 seconds throughout)  2 2-2 4 mph,  2% incline, x 12 minutes (with HT/HN 1 min each x 2)  2 2 - 2 6 mph, 2% incline x 10 minutes (with HT/HN 1 min each x 2)                                                                                                            HEP  FT firm with HT/HN 3 x 30 sec each    Walking with HT/HN     Education on vestibular disorders and treatment with physical therapy Added VOR x 1 with busy background (mirror) 3 x 30 sec each HT/HN   Progressed VOR with ambulation  Verbally reviewed HEP with instruction to increase HT/HN speed with VOR exercises        Modalities                                     Assessment:  Patient tolerated session well today  Tolerated progression of TM speed with HT/HN with reports of mild dizziness after turning head while walking  Progressed VOR exercise background to a moving background with window    Continued to perform higher level dynamic balance tasks today, patient with good balance reactions however continues to be frustrated by performance  Discussed recommendation for cognitive behavioral therapy to discuss symptoms and strategies to limit anxiety regarding aging/functional activities  Verbally reviewed HEP with instruction to perform every day over the next week  Anticipate patient will only need 2 more sessions of PT as he is progressing well  Patient will benefit from continued PT to improve dynamic balance and vestibular integration to decrease frequency of dizziness/off balance symptoms with daily activities  Plan: Progress treatment as tolerated  Progress VOR and dynamic balance exercises  VOR x 2 next session            Ziyad Villa, PT  12/18/19

## 2020-01-03 ENCOUNTER — EVALUATION (OUTPATIENT)
Dept: PHYSICAL THERAPY | Facility: REHABILITATION | Age: 70
End: 2020-01-03
Payer: COMMERCIAL

## 2020-01-03 DIAGNOSIS — H81.10 BPPV (BENIGN PAROXYSMAL POSITIONAL VERTIGO), UNSPECIFIED LATERALITY: Primary | ICD-10-CM

## 2020-01-03 DIAGNOSIS — R42 DIZZINESS: ICD-10-CM

## 2020-01-03 DIAGNOSIS — H81.10 BENIGN PAROXYSMAL POSITIONAL VERTIGO, UNSPECIFIED LATERALITY: ICD-10-CM

## 2020-01-03 PROCEDURE — 97112 NEUROMUSCULAR REEDUCATION: CPT

## 2020-01-03 NOTE — PROGRESS NOTES
Re-Evaluation/Discharge Note    Today's date: 1/3/2020  Patient name: Jan Clark  : 1950  MRN: [id number]  Referring provider: Milena Singletary , *  Dx:   Encounter Diagnosis     ICD-10-CM    1  BPPV (benign paroxysmal positional vertigo), unspecified laterality H81 10    2  Dizziness R42    3  Benign paroxysmal positional vertigo, unspecified laterality H81 10      Start Time: 817  Stop Time: 910  Total time in clinic (min): 53 minutes     INTERVENTION COMMENTS:  Diagnosis: BPPV (benign paroxysmal positional vertigo), unspecified laterality [H81 10]  Insurance: Payor: [address] REP / Plan: [employer] PPO  [address] REP / Product Type: Medicare PPO /   Re-Evaluation performed today (1/3/20)    Subjective: Patient reports he is doing well  He gets occasional dizziness with doing his exercises and when in the grocery store  His balance feels good and he has been doing his regular exercises  He went skiing twice and did not notice any dizziness or difficulty with his balance  He is feeling better about his symptoms than he did a few weeks ago  Reports he only will notice occasional dizziness when walking in the grocery store, however this is variable depending on the day  He does not feel limited with any ADLs, household/community activities, as well as regular exercise        Objective: See treatment diary below    1/3/20:  VOR Horizontal: Normal, no symptoms  VOR vertical: Normal, no symptoms   VOR Cx: Normal, no symptoms     Dynamic Visual Acuity: IE: 4 line difference, abnormal (no dizziness symptoms); 1/3/20: 2 line difference     Functional Gait Assessment  Initial Eval ():  - 25/30   1/3/20:   3/3 Gait level surface  3/3 Change in gait speed  3/3 Gait with horizontal head turns  3/3 Gait with vertical head turns  3/3 Gait and pivot turn  3/3 Step over obstacle  3/3 Gait with narrow base of support  2/3 Gait with eyes closed  3/3 Ambulating backwards  3/3 Steps  29/30 Total score (less than 22/30 indicates increased risk of fall)       Initial Eval (11/14):    Tandem EO: 16 seconds, 15 seconds  Tandem EC: 10 seconds, 2 seconds      12/4/19:   Tandem EO: 30 seconds  Tandem EC: 25 seconds     1/2/20:   Tandem EO: 30 seconds   Tandem EC: 30 seconds     Precautions: N/A    Manual                                                     Exercise Diary  11/21/19 11/27/19 12/4/19 12/13/19 12/18/19 1/3/20   Static balance FT EO with HT/HN 3 x 30 sec     Semi tandem EC on foam 3 x 30 sec  FT EO with HT/HN in front of mirror - 3 x 30 sec     Semi tandem EC on foam 3 x 30 sec FT EO foam with HT/HN in front of mirror - 2 x 30 sec each     Semi tandem EC on foam 2 x 30 sec  Semi-tandem on foam EO with HT/HN - 2 x 45 sec each     FT EC foam with HT/HN 45 sec each  Semi-tandem on foam EO - 2 x 45 sec each      FT EC foam with HT/HN - 2 x 45 sec each Testing performed (above)   Dynamic balance Walking with HT/HN 2 x 60 ft each Walking with HT/HN (performed on TM today)     Tandem walking fwd/back 60 ft each    Tandem walking fwd with HT 40 ft x 2  Walking with HT/HN (performed on TM)     Tandem walking fwd with HT/HN 2 x 30 ft each     Walking with HT/HN (performed on TM)      Walking with HT/HN (performed on TM)  Testing performed (above)   Vestibular habituation exercises   VOR with ambulation 60 ft x 2 each HT/HN     VOR cancel with ambulation 60 ft x 2 each HT/HN      VOR with ambulation 30 ft x 4 each HT/HN     VOR cancel with ambulation 30 ft x 2 each HT/HN     Walking while tossing ball up and down with head movements 2 x 30 ft  VOR cancel with ambulation 30 ft x 2 each HT/HN     VOR with ambulation 30 ft x 2 fwd/backwards    Tandem walking while tossing ball with therapist 2 x 30 ft  VOR cancel with ambulation 30 ft x 2 HT/HN both fwd/backwards      VOR with ambulation 30 ft x 2 fwd/backwards    Tandem walking while tossing ball with therapist 2 x 30 ft     FT VOR HT/HN with moving background (windown facing outside) 2 x 45 sec   VOR x 2 (standing FT) 2 x 30 sec      Testing (above)    Optokinetic  training    Standing on foam x 2 minutes  FT VOR (HT/HN) with optokinetic background  (1 min each)               TM   1 8 mph,    5 % incline 11 minutes (last 6 minutes with HT/HN for 30 seconds)  2 0 - 2 4 mph, 1% incline, x 12 minutes (with HT/HN for 45 seconds throughout)  2 2-2 4 mph,  2% incline, x 12 minutes (with HT/HN 1 min each x 2)  2 2 - 2 6 mph, 2% incline x 10 minutes (with HT/HN 1 min each x 2)                                                                                                                          HEP  FT firm with HT/HN 3 x 30 sec each    Walking with HT/HN     Education on vestibular disorders and treatment with physical therapy Added VOR x 1 with busy background (mirror) 3 x 30 sec each HT/HN   Progressed VOR with ambulation  Verbally reviewed HEP with instruction to increase HT/HN speed with VOR exercises  Reviewed HEP for VOR, VOR Cx with ambulation, backwards tandem walking (added VOR x 2 today)        Modalities                                      Assessment:  Re-evaluation performed today  Patient with significant improvements in dizziness symptoms, static/dynamic balance per tandem balance/FGA testing, and vestibular function per DVA testing  Patient scored nearly perfect score on FGA, only losing a point for eyes closed walking with no subjective reports of dizziness  Patient also scored within normal limits with DVA testing, indicating improvements in VOR function  He has been very compliant with HEP for vestibular adaptation/habituation to increase efficiency and function  Patient subjectively reports improvement in function as he was able to go skiing twice over the past two weeks with no reports of dizziness/imbalance  Reviewed and progressed HEP (added VOR x 2) for added vestibular challenge to continue at home     Discussed recommendation for cognitive behavioral therapy as patient has been very anxious about symptoms and frustrated with his aging  Patient expressed that he will contact PCP if he feels he will benefit from CBT, however at this time he thinks he is doing better  Patient able to acknowledge progress with PT and felt satisfied with his decrease in dizziness symptoms  Patient discharged from physical therapy at this session, with instruction to call if dizziness symptoms return in the future  STG Goals: (2 weeks)  1  Patient will be compliant with HEP  - MET   2  Patient will report reduced onset of dizziness to only 2x a week with walking and performing household chores  MET  3  Patient will improve static balance by improving tandem balance EO to 30 seconds  - MET      LTG Goals: (4-6 weeks)  1  Patient will report no dizziness when grocery shopping or ambulating in stimulating/busy environments  Partially Met (will still report occasional dizziness in the grocery store)   2  Patient will perform aerobic exercise 3-5 x a week without increase in dizziness symptoms  MET   3  Patient will improve dynamic balance per increasing FGA score to at least 26/30  MET    Plan: Discharged this visit  Recommended to continue with daily HEP and to contact PCP/PT if symptoms return or worsen           Elna Cabot, PT  1/3/19

## 2020-01-08 ENCOUNTER — APPOINTMENT (OUTPATIENT)
Dept: PHYSICAL THERAPY | Facility: REHABILITATION | Age: 70
End: 2020-01-08
Payer: COMMERCIAL

## 2020-04-09 ENCOUNTER — APPOINTMENT (OUTPATIENT)
Dept: LAB | Facility: CLINIC | Age: 70
End: 2020-04-09
Payer: COMMERCIAL

## 2020-04-09 ENCOUNTER — TRANSCRIBE ORDERS (OUTPATIENT)
Dept: LAB | Facility: CLINIC | Age: 70
End: 2020-04-09

## 2020-04-09 DIAGNOSIS — M35.3 POLYMYALGIA RHEUMATICA (HCC): ICD-10-CM

## 2020-04-09 DIAGNOSIS — M35.3 POLYMYALGIA RHEUMATICA (HCC): Primary | ICD-10-CM

## 2020-04-09 LAB
CRP SERPL QL: 5.5 MG/L
ERYTHROCYTE [SEDIMENTATION RATE] IN BLOOD: 9 MM/HOUR (ref 0–10)

## 2020-04-09 PROCEDURE — 86140 C-REACTIVE PROTEIN: CPT

## 2020-04-09 PROCEDURE — 36415 COLL VENOUS BLD VENIPUNCTURE: CPT

## 2020-04-09 PROCEDURE — 85652 RBC SED RATE AUTOMATED: CPT

## 2020-08-18 ENCOUNTER — TELEPHONE (OUTPATIENT)
Dept: FAMILY MEDICINE CLINIC | Facility: CLINIC | Age: 70
End: 2020-08-18

## 2020-08-28 ENCOUNTER — APPOINTMENT (OUTPATIENT)
Dept: LAB | Facility: CLINIC | Age: 70
End: 2020-08-28
Payer: COMMERCIAL

## 2020-08-28 ENCOUNTER — TRANSCRIBE ORDERS (OUTPATIENT)
Dept: LAB | Facility: CLINIC | Age: 70
End: 2020-08-28

## 2020-08-28 DIAGNOSIS — Z79.899 ENCOUNTER FOR LONG-TERM (CURRENT) USE OF OTHER MEDICATIONS: Primary | ICD-10-CM

## 2020-08-28 DIAGNOSIS — R53.83 FATIGUE, UNSPECIFIED TYPE: ICD-10-CM

## 2020-08-28 DIAGNOSIS — M35.3 POLYMYALGIA RHEUMATICA (HCC): ICD-10-CM

## 2020-08-28 DIAGNOSIS — Z79.899 ENCOUNTER FOR LONG-TERM (CURRENT) USE OF OTHER MEDICATIONS: ICD-10-CM

## 2020-08-28 LAB
ALBUMIN SERPL BCP-MCNC: 4 G/DL (ref 3.5–5)
ALP SERPL-CCNC: 57 U/L (ref 46–116)
ALT SERPL W P-5'-P-CCNC: 23 U/L (ref 12–78)
ANION GAP SERPL CALCULATED.3IONS-SCNC: 6 MMOL/L (ref 4–13)
AST SERPL W P-5'-P-CCNC: 18 U/L (ref 5–45)
BASOPHILS # BLD AUTO: 0.04 THOUSANDS/ΜL (ref 0–0.1)
BASOPHILS NFR BLD AUTO: 1 % (ref 0–1)
BILIRUB SERPL-MCNC: 0.59 MG/DL (ref 0.2–1)
BUN SERPL-MCNC: 19 MG/DL (ref 5–25)
CALCIUM SERPL-MCNC: 9.3 MG/DL (ref 8.3–10.1)
CHLORIDE SERPL-SCNC: 107 MMOL/L (ref 100–108)
CO2 SERPL-SCNC: 26 MMOL/L (ref 21–32)
CREAT SERPL-MCNC: 0.88 MG/DL (ref 0.6–1.3)
CRP SERPL QL: <3 MG/L
EOSINOPHIL # BLD AUTO: 0.14 THOUSAND/ΜL (ref 0–0.61)
EOSINOPHIL NFR BLD AUTO: 2 % (ref 0–6)
ERYTHROCYTE [DISTWIDTH] IN BLOOD BY AUTOMATED COUNT: 15.5 % (ref 11.6–15.1)
ERYTHROCYTE [SEDIMENTATION RATE] IN BLOOD: 5 MM/HOUR (ref 0–19)
GFR SERPL CREATININE-BSD FRML MDRD: 86 ML/MIN/1.73SQ M
GLUCOSE SERPL-MCNC: 84 MG/DL (ref 65–140)
HCT VFR BLD AUTO: 45.5 % (ref 36.5–49.3)
HGB BLD-MCNC: 14.8 G/DL (ref 12–17)
IMM GRANULOCYTES # BLD AUTO: 0.04 THOUSAND/UL (ref 0–0.2)
IMM GRANULOCYTES NFR BLD AUTO: 1 % (ref 0–2)
LYMPHOCYTES # BLD AUTO: 1.53 THOUSANDS/ΜL (ref 0.6–4.47)
LYMPHOCYTES NFR BLD AUTO: 23 % (ref 14–44)
MCH RBC QN AUTO: 29.6 PG (ref 26.8–34.3)
MCHC RBC AUTO-ENTMCNC: 32.5 G/DL (ref 31.4–37.4)
MCV RBC AUTO: 91 FL (ref 82–98)
MONOCYTES # BLD AUTO: 0.98 THOUSAND/ΜL (ref 0.17–1.22)
MONOCYTES NFR BLD AUTO: 15 % (ref 4–12)
NEUTROPHILS # BLD AUTO: 3.89 THOUSANDS/ΜL (ref 1.85–7.62)
NEUTS SEG NFR BLD AUTO: 58 % (ref 43–75)
NRBC BLD AUTO-RTO: 0 /100 WBCS
PLATELET # BLD AUTO: 209 THOUSANDS/UL (ref 149–390)
PMV BLD AUTO: 12.1 FL (ref 8.9–12.7)
POTASSIUM SERPL-SCNC: 3.8 MMOL/L (ref 3.5–5.3)
PROT SERPL-MCNC: 7 G/DL (ref 6.4–8.2)
RBC # BLD AUTO: 5 MILLION/UL (ref 3.88–5.62)
SODIUM SERPL-SCNC: 139 MMOL/L (ref 136–145)
TSH SERPL DL<=0.05 MIU/L-ACNC: 1.09 UIU/ML (ref 0.36–3.74)
WBC # BLD AUTO: 6.62 THOUSAND/UL (ref 4.31–10.16)

## 2020-08-28 PROCEDURE — 85025 COMPLETE CBC W/AUTO DIFF WBC: CPT

## 2020-08-28 PROCEDURE — 86140 C-REACTIVE PROTEIN: CPT

## 2020-08-28 PROCEDURE — 80053 COMPREHEN METABOLIC PANEL: CPT

## 2020-08-28 PROCEDURE — 36415 COLL VENOUS BLD VENIPUNCTURE: CPT

## 2020-08-28 PROCEDURE — 84443 ASSAY THYROID STIM HORMONE: CPT

## 2020-08-28 PROCEDURE — 85652 RBC SED RATE AUTOMATED: CPT

## 2020-09-22 ENCOUNTER — APPOINTMENT (OUTPATIENT)
Dept: LAB | Facility: MEDICAL CENTER | Age: 70
End: 2020-09-22
Payer: COMMERCIAL

## 2020-09-22 ENCOUNTER — TRANSCRIBE ORDERS (OUTPATIENT)
Dept: LAB | Facility: MEDICAL CENTER | Age: 70
End: 2020-09-22

## 2020-09-22 DIAGNOSIS — Z79.899 ENCOUNTER FOR LONG-TERM (CURRENT) USE OF OTHER MEDICATIONS: ICD-10-CM

## 2020-09-22 DIAGNOSIS — M35.3 POLYMYALGIA RHEUMATICA (HCC): ICD-10-CM

## 2020-09-22 DIAGNOSIS — Z79.899 ENCOUNTER FOR LONG-TERM (CURRENT) USE OF OTHER MEDICATIONS: Primary | ICD-10-CM

## 2020-09-22 LAB
CRP SERPL QL: 4.3 MG/L
ERYTHROCYTE [SEDIMENTATION RATE] IN BLOOD: 7 MM/HOUR (ref 0–19)

## 2020-09-22 PROCEDURE — 86430 RHEUMATOID FACTOR TEST QUAL: CPT

## 2020-09-22 PROCEDURE — 86200 CCP ANTIBODY: CPT

## 2020-09-22 PROCEDURE — 36415 COLL VENOUS BLD VENIPUNCTURE: CPT

## 2020-09-22 PROCEDURE — 85652 RBC SED RATE AUTOMATED: CPT

## 2020-09-22 PROCEDURE — 86140 C-REACTIVE PROTEIN: CPT

## 2020-09-23 LAB — RHEUMATOID FACT SER QL LA: NEGATIVE

## 2020-09-24 LAB — CCP IGA+IGG SERPL IA-ACNC: 3 UNITS (ref 0–19)

## 2020-09-30 ENCOUNTER — TELEPHONE (OUTPATIENT)
Dept: UROLOGY | Facility: MEDICAL CENTER | Age: 70
End: 2020-09-30

## 2020-09-30 DIAGNOSIS — N13.8 BPH WITH OBSTRUCTION/LOWER URINARY TRACT SYMPTOMS: Primary | ICD-10-CM

## 2020-09-30 DIAGNOSIS — N40.1 BPH WITH OBSTRUCTION/LOWER URINARY TRACT SYMPTOMS: Primary | ICD-10-CM

## 2020-09-30 NOTE — TELEPHONE ENCOUNTER
Called patient - he is told that a script was placed in Epic  He will go to [place] [id number] for his testing

## 2020-09-30 NOTE — TELEPHONE ENCOUNTER
Patient last seen Dr Boggs Points 2018  Patient has an upcoming fup on 10/30  Patient is requesting PSA script to be mailed to his home address   Patient can be reached at [phone]

## 2020-10-20 ENCOUNTER — APPOINTMENT (OUTPATIENT)
Dept: LAB | Facility: CLINIC | Age: 70
End: 2020-10-20
Payer: COMMERCIAL

## 2020-10-20 DIAGNOSIS — N40.1 BPH WITH OBSTRUCTION/LOWER URINARY TRACT SYMPTOMS: ICD-10-CM

## 2020-10-20 DIAGNOSIS — N13.8 BPH WITH OBSTRUCTION/LOWER URINARY TRACT SYMPTOMS: ICD-10-CM

## 2020-10-20 LAB — PSA SERPL-MCNC: 4.5 NG/ML (ref 0–4)

## 2020-10-20 PROCEDURE — 36415 COLL VENOUS BLD VENIPUNCTURE: CPT

## 2020-10-20 PROCEDURE — G0103 PSA SCREENING: HCPCS

## 2020-10-30 ENCOUNTER — OFFICE VISIT (OUTPATIENT)
Dept: UROLOGY | Facility: MEDICAL CENTER | Age: 70
End: 2020-10-30
Payer: COMMERCIAL

## 2020-10-30 VITALS
HEIGHT: 66 IN | TEMPERATURE: 97.3 F | HEART RATE: 72 BPM | WEIGHT: 147 LBS | BODY MASS INDEX: 23.63 KG/M2 | DIASTOLIC BLOOD PRESSURE: 76 MMHG | SYSTOLIC BLOOD PRESSURE: 140 MMHG

## 2020-10-30 DIAGNOSIS — N13.8 BPH WITH URINARY OBSTRUCTION: Primary | ICD-10-CM

## 2020-10-30 DIAGNOSIS — R97.20 ELEVATED PROSTATE SPECIFIC ANTIGEN (PSA): ICD-10-CM

## 2020-10-30 DIAGNOSIS — N40.1 BPH WITH URINARY OBSTRUCTION: Primary | ICD-10-CM

## 2020-10-30 LAB
SL AMB  POCT GLUCOSE, UA: NORMAL
SL AMB LEUKOCYTE ESTERASE,UA: NORMAL
SL AMB POCT BILIRUBIN,UA: NORMAL
SL AMB POCT BLOOD,UA: NORMAL
SL AMB POCT CLARITY,UA: CLEAR
SL AMB POCT COLOR,UA: YELLOW
SL AMB POCT KETONES,UA: NORMAL
SL AMB POCT NITRITE,UA: NORMAL
SL AMB POCT PH,UA: 7
SL AMB POCT SPECIFIC GRAVITY,UA: 1.01
SL AMB POCT URINE PROTEIN: NORMAL
SL AMB POCT UROBILINOGEN: NORMAL

## 2020-10-30 PROCEDURE — 81003 URINALYSIS AUTO W/O SCOPE: CPT | Performed by: UROLOGY

## 2020-10-30 PROCEDURE — 1160F RVW MEDS BY RX/DR IN RCRD: CPT | Performed by: UROLOGY

## 2020-10-30 PROCEDURE — 3008F BODY MASS INDEX DOCD: CPT | Performed by: UROLOGY

## 2020-10-30 PROCEDURE — 99214 OFFICE O/P EST MOD 30 MIN: CPT | Performed by: UROLOGY

## 2020-10-30 RX ORDER — LIDOCAINE HCL/PF 50 MG/5 ML
SYRINGE (ML) INJECTION
COMMUNITY
End: 2021-11-03 | Stop reason: CLARIF

## 2020-10-30 RX ORDER — PREDNISONE 1 MG/1
2.5 TABLET ORAL DAILY
COMMUNITY
Start: 2020-10-26 | End: 2022-06-28

## 2021-05-05 ENCOUNTER — TELEPHONE (OUTPATIENT)
Dept: FAMILY MEDICINE CLINIC | Facility: CLINIC | Age: 71
End: 2021-05-05
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
## 2021-06-16 ENCOUNTER — TELEPHONE (OUTPATIENT)
Dept: FAMILY MEDICINE CLINIC | Facility: CLINIC | Age: 71
End: 2021-06-16

## 2021-10-22 ENCOUNTER — TELEPHONE (OUTPATIENT)
Dept: UROLOGY | Facility: AMBULATORY SURGERY CENTER | Age: 71
End: 2021-10-22

## 2021-10-29 ENCOUNTER — APPOINTMENT (OUTPATIENT)
Dept: LAB | Facility: MEDICAL CENTER | Age: 71
End: 2021-10-29
Attending: UROLOGY
Payer: COMMERCIAL

## 2021-10-29 DIAGNOSIS — R97.20 ELEVATED PROSTATE SPECIFIC ANTIGEN (PSA): ICD-10-CM

## 2021-10-29 DIAGNOSIS — M35.3 POLYMYALGIA RHEUMATICA (HCC): ICD-10-CM

## 2021-10-29 DIAGNOSIS — Z79.899 ENCOUNTER FOR LONG-TERM (CURRENT) USE OF OTHER MEDICATIONS: ICD-10-CM

## 2021-10-29 LAB
ALBUMIN SERPL BCP-MCNC: 3.7 G/DL (ref 3.5–5)
ALP SERPL-CCNC: 54 U/L (ref 46–116)
ALT SERPL W P-5'-P-CCNC: 21 U/L (ref 12–78)
ANION GAP SERPL CALCULATED.3IONS-SCNC: 4 MMOL/L (ref 4–13)
AST SERPL W P-5'-P-CCNC: 18 U/L (ref 5–45)
BASOPHILS # BLD AUTO: 0.04 THOUSANDS/ΜL (ref 0–0.1)
BASOPHILS NFR BLD AUTO: 1 % (ref 0–1)
BILIRUB SERPL-MCNC: 0.6 MG/DL (ref 0.2–1)
BUN SERPL-MCNC: 15 MG/DL (ref 5–25)
CALCIUM SERPL-MCNC: 9.1 MG/DL (ref 8.3–10.1)
CHLORIDE SERPL-SCNC: 107 MMOL/L (ref 100–108)
CO2 SERPL-SCNC: 27 MMOL/L (ref 21–32)
CREAT SERPL-MCNC: 0.98 MG/DL (ref 0.6–1.3)
CRP SERPL QL: <3 MG/L
EOSINOPHIL # BLD AUTO: 0.16 THOUSAND/ΜL (ref 0–0.61)
EOSINOPHIL NFR BLD AUTO: 3 % (ref 0–6)
ERYTHROCYTE [DISTWIDTH] IN BLOOD BY AUTOMATED COUNT: 15.1 % (ref 11.6–15.1)
ERYTHROCYTE [SEDIMENTATION RATE] IN BLOOD: 2 MM/HOUR (ref 0–19)
GFR SERPL CREATININE-BSD FRML MDRD: 76 ML/MIN/1.73SQ M
GLUCOSE P FAST SERPL-MCNC: 139 MG/DL (ref 65–99)
HCT VFR BLD AUTO: 45.4 % (ref 36.5–49.3)
HGB BLD-MCNC: 15.2 G/DL (ref 12–17)
IMM GRANULOCYTES # BLD AUTO: 0.04 THOUSAND/UL (ref 0–0.2)
IMM GRANULOCYTES NFR BLD AUTO: 1 % (ref 0–2)
LYMPHOCYTES # BLD AUTO: 1.25 THOUSANDS/ΜL (ref 0.6–4.47)
LYMPHOCYTES NFR BLD AUTO: 20 % (ref 14–44)
MCH RBC QN AUTO: 29.5 PG (ref 26.8–34.3)
MCHC RBC AUTO-ENTMCNC: 33.5 G/DL (ref 31.4–37.4)
MCV RBC AUTO: 88 FL (ref 82–98)
MONOCYTES # BLD AUTO: 0.9 THOUSAND/ΜL (ref 0.17–1.22)
MONOCYTES NFR BLD AUTO: 14 % (ref 4–12)
NEUTROPHILS # BLD AUTO: 4.02 THOUSANDS/ΜL (ref 1.85–7.62)
NEUTS SEG NFR BLD AUTO: 61 % (ref 43–75)
NRBC BLD AUTO-RTO: 0 /100 WBCS
PLATELET # BLD AUTO: 228 THOUSANDS/UL (ref 149–390)
PMV BLD AUTO: 12.2 FL (ref 8.9–12.7)
POTASSIUM SERPL-SCNC: 4 MMOL/L (ref 3.5–5.3)
PROT SERPL-MCNC: 6.9 G/DL (ref 6.4–8.2)
PSA SERPL-MCNC: 6.3 NG/ML (ref 0–4)
RBC # BLD AUTO: 5.15 MILLION/UL (ref 3.88–5.62)
SODIUM SERPL-SCNC: 138 MMOL/L (ref 136–145)
WBC # BLD AUTO: 6.41 THOUSAND/UL (ref 4.31–10.16)

## 2021-10-29 PROCEDURE — 36415 COLL VENOUS BLD VENIPUNCTURE: CPT

## 2021-10-29 PROCEDURE — 85025 COMPLETE CBC W/AUTO DIFF WBC: CPT

## 2021-10-29 PROCEDURE — 80053 COMPREHEN METABOLIC PANEL: CPT

## 2021-10-29 PROCEDURE — 86140 C-REACTIVE PROTEIN: CPT

## 2021-10-29 PROCEDURE — 85652 RBC SED RATE AUTOMATED: CPT

## 2021-10-29 PROCEDURE — 84153 ASSAY OF PSA TOTAL: CPT

## 2021-11-03 ENCOUNTER — APPOINTMENT (EMERGENCY)
Dept: CT IMAGING | Facility: HOSPITAL | Age: 71
DRG: 440 | End: 2021-11-03
Payer: COMMERCIAL

## 2021-11-03 ENCOUNTER — HOSPITAL ENCOUNTER (INPATIENT)
Facility: HOSPITAL | Age: 71
LOS: 1 days | Discharge: HOME | DRG: 440 | End: 2021-11-05
Attending: EMERGENCY MEDICINE | Admitting: INTERNAL MEDICINE
Payer: COMMERCIAL

## 2021-11-03 ENCOUNTER — OFFICE VISIT (OUTPATIENT)
Dept: FAMILY MEDICINE CLINIC | Facility: CLINIC | Age: 71
End: 2021-11-03
Payer: COMMERCIAL

## 2021-11-03 ENCOUNTER — LAB (OUTPATIENT)
Dept: LAB | Facility: HOSPITAL | Age: 71
DRG: 440 | End: 2021-11-03
Attending: FAMILY MEDICINE
Payer: COMMERCIAL

## 2021-11-03 VITALS
BODY MASS INDEX: 22.6 KG/M2 | DIASTOLIC BLOOD PRESSURE: 70 MMHG | OXYGEN SATURATION: 98 % | SYSTOLIC BLOOD PRESSURE: 138 MMHG | HEART RATE: 80 BPM | WEIGHT: 140.6 LBS | HEIGHT: 66 IN | TEMPERATURE: 97.8 F

## 2021-11-03 DIAGNOSIS — K85.00 IDIOPATHIC ACUTE PANCREATITIS WITHOUT INFECTION OR NECROSIS: Primary | ICD-10-CM

## 2021-11-03 DIAGNOSIS — M35.3 PMR (POLYMYALGIA RHEUMATICA) (HCC): ICD-10-CM

## 2021-11-03 DIAGNOSIS — K29.00 ACUTE GASTRITIS WITHOUT HEMORRHAGE, UNSPECIFIED GASTRITIS TYPE: Primary | ICD-10-CM

## 2021-11-03 DIAGNOSIS — K29.00 ACUTE GASTRITIS WITHOUT HEMORRHAGE, UNSPECIFIED GASTRITIS TYPE: ICD-10-CM

## 2021-11-03 DIAGNOSIS — K85.90 ACUTE PANCREATITIS: ICD-10-CM

## 2021-11-03 LAB
ALBUMIN SERPL BCP-MCNC: 4.4 G/DL (ref 3.5–5)
ALP SERPL-CCNC: 65 U/L (ref 46–116)
ALT SERPL W P-5'-P-CCNC: 24 U/L (ref 12–78)
ANION GAP SERPL CALCULATED.3IONS-SCNC: 13 MMOL/L (ref 4–13)
AST SERPL W P-5'-P-CCNC: 17 U/L (ref 5–45)
BASOPHILS # BLD AUTO: 0.05 THOUSANDS/ΜL (ref 0–0.1)
BASOPHILS NFR BLD AUTO: 0 % (ref 0–1)
BILIRUB SERPL-MCNC: 0.64 MG/DL (ref 0.2–1)
BUN SERPL-MCNC: 22 MG/DL (ref 5–25)
CALCIUM SERPL-MCNC: 9.7 MG/DL (ref 8.3–10.1)
CHLORIDE SERPL-SCNC: 100 MMOL/L (ref 100–108)
CK SERPL-CCNC: 108 U/L (ref 39–308)
CO2 SERPL-SCNC: 24 MMOL/L (ref 21–32)
CREAT SERPL-MCNC: 1.28 MG/DL (ref 0.6–1.3)
EOSINOPHIL # BLD AUTO: 0.04 THOUSAND/ΜL (ref 0–0.61)
EOSINOPHIL NFR BLD AUTO: 0 % (ref 0–6)
ERYTHROCYTE [DISTWIDTH] IN BLOOD BY AUTOMATED COUNT: 14.8 % (ref 11.6–15.1)
GFR SERPL CREATININE-BSD FRML MDRD: 55 ML/MIN/1.73SQ M
GLUCOSE SERPL-MCNC: 130 MG/DL (ref 65–140)
HCT VFR BLD AUTO: 50.1 % (ref 36.5–49.3)
HGB BLD-MCNC: 16.5 G/DL (ref 12–17)
IMM GRANULOCYTES # BLD AUTO: 0.12 THOUSAND/UL (ref 0–0.2)
IMM GRANULOCYTES NFR BLD AUTO: 1 % (ref 0–2)
LIPASE SERPL-CCNC: 9794 U/L (ref 73–393)
LIPASE SERPL-CCNC: ABNORMAL U/L (ref 73–393)
LYMPHOCYTES # BLD AUTO: 1.15 THOUSANDS/ΜL (ref 0.6–4.47)
LYMPHOCYTES NFR BLD AUTO: 9 % (ref 14–44)
MCH RBC QN AUTO: 28.8 PG (ref 26.8–34.3)
MCHC RBC AUTO-ENTMCNC: 32.9 G/DL (ref 31.4–37.4)
MCV RBC AUTO: 88 FL (ref 82–98)
MONOCYTES # BLD AUTO: 1.74 THOUSAND/ΜL (ref 0.17–1.22)
MONOCYTES NFR BLD AUTO: 13 % (ref 4–12)
NEUTROPHILS # BLD AUTO: 10.11 THOUSANDS/ΜL (ref 1.85–7.62)
NEUTS SEG NFR BLD AUTO: 77 % (ref 43–75)
NRBC BLD AUTO-RTO: 0 /100 WBCS
PLATELET # BLD AUTO: 230 THOUSANDS/UL (ref 149–390)
PMV BLD AUTO: 10.9 FL (ref 8.9–12.7)
POTASSIUM SERPL-SCNC: 4 MMOL/L (ref 3.5–5.3)
PROT SERPL-MCNC: 8 G/DL (ref 6.4–8.2)
RBC # BLD AUTO: 5.72 MILLION/UL (ref 3.88–5.62)
SODIUM SERPL-SCNC: 137 MMOL/L (ref 136–145)
TROPONIN I SERPL-MCNC: <0.02 NG/ML
WBC # BLD AUTO: 13.21 THOUSAND/UL (ref 4.31–10.16)

## 2021-11-03 PROCEDURE — 36415 COLL VENOUS BLD VENIPUNCTURE: CPT

## 2021-11-03 PROCEDURE — 96360 HYDRATION IV INFUSION INIT: CPT

## 2021-11-03 PROCEDURE — 85025 COMPLETE CBC W/AUTO DIFF WBC: CPT

## 2021-11-03 PROCEDURE — 96361 HYDRATE IV INFUSION ADD-ON: CPT

## 2021-11-03 PROCEDURE — 99285 EMERGENCY DEPT VISIT HI MDM: CPT | Performed by: EMERGENCY MEDICINE

## 2021-11-03 PROCEDURE — 83690 ASSAY OF LIPASE: CPT

## 2021-11-03 PROCEDURE — 99285 EMERGENCY DEPT VISIT HI MDM: CPT

## 2021-11-03 PROCEDURE — 93000 ELECTROCARDIOGRAM COMPLETE: CPT | Performed by: FAMILY MEDICINE

## 2021-11-03 PROCEDURE — 74177 CT ABD & PELVIS W/CONTRAST: CPT

## 2021-11-03 PROCEDURE — 80053 COMPREHEN METABOLIC PANEL: CPT | Performed by: EMERGENCY MEDICINE

## 2021-11-03 PROCEDURE — 83690 ASSAY OF LIPASE: CPT | Performed by: EMERGENCY MEDICINE

## 2021-11-03 PROCEDURE — 99214 OFFICE O/P EST MOD 30 MIN: CPT | Performed by: FAMILY MEDICINE

## 2021-11-03 PROCEDURE — 82550 ASSAY OF CK (CPK): CPT

## 2021-11-03 PROCEDURE — 84484 ASSAY OF TROPONIN QUANT: CPT

## 2021-11-03 RX ORDER — LANSOPRAZOLE 30 MG/1
30 CAPSULE, DELAYED RELEASE ORAL DAILY
Qty: 30 CAPSULE | Refills: 1 | Status: SHIPPED | OUTPATIENT
Start: 2021-11-03 | End: 2021-11-06

## 2021-11-03 RX ADMIN — SODIUM CHLORIDE 1000 ML: 0.9 INJECTION, SOLUTION INTRAVENOUS at 22:29

## 2021-11-03 RX ADMIN — IOHEXOL 100 ML: 350 INJECTION, SOLUTION INTRAVENOUS at 23:33

## 2021-11-04 ENCOUNTER — HOSPITAL ENCOUNTER (OUTPATIENT)
Dept: NON INVASIVE DIAGNOSTICS | Facility: HOSPITAL | Age: 71
DRG: 440 | End: 2021-11-04
Attending: FAMILY MEDICINE
Payer: COMMERCIAL

## 2021-11-04 ENCOUNTER — APPOINTMENT (INPATIENT)
Dept: ULTRASOUND IMAGING | Facility: HOSPITAL | Age: 71
DRG: 440 | End: 2021-11-04
Payer: COMMERCIAL

## 2021-11-04 PROBLEM — N17.9 ACUTE KIDNEY INJURY (HCC): Status: ACTIVE | Noted: 2021-11-04

## 2021-11-04 PROBLEM — E44.1 MILD PROTEIN-CALORIE MALNUTRITION (HCC): Chronic | Status: ACTIVE | Noted: 2021-11-04

## 2021-11-04 PROBLEM — K85.00 IDIOPATHIC ACUTE PANCREATITIS WITHOUT INFECTION OR NECROSIS: Status: ACTIVE | Noted: 2021-11-04

## 2021-11-04 LAB
ALBUMIN SERPL BCP-MCNC: 3.3 G/DL (ref 3.5–5)
ALP SERPL-CCNC: 46 U/L (ref 46–116)
ALT SERPL W P-5'-P-CCNC: 19 U/L (ref 12–78)
ANION GAP SERPL CALCULATED.3IONS-SCNC: 8 MMOL/L (ref 4–13)
AST SERPL W P-5'-P-CCNC: 13 U/L (ref 5–45)
BASOPHILS # BLD AUTO: 0.04 THOUSANDS/ΜL (ref 0–0.1)
BASOPHILS NFR BLD AUTO: 0 % (ref 0–1)
BILIRUB SERPL-MCNC: 0.57 MG/DL (ref 0.2–1)
BUN SERPL-MCNC: 17 MG/DL (ref 5–25)
CALCIUM ALBUM COR SERPL-MCNC: 8.7 MG/DL (ref 8.3–10.1)
CALCIUM SERPL-MCNC: 8.1 MG/DL (ref 8.3–10.1)
CHLORIDE SERPL-SCNC: 105 MMOL/L (ref 100–108)
CHOLEST SERPL-MCNC: 192 MG/DL (ref 50–200)
CHOLEST SERPL-MCNC: 192 MG/DL (ref 50–200)
CO2 SERPL-SCNC: 26 MMOL/L (ref 21–32)
CREAT SERPL-MCNC: 0.91 MG/DL (ref 0.6–1.3)
EOSINOPHIL # BLD AUTO: 0.03 THOUSAND/ΜL (ref 0–0.61)
EOSINOPHIL NFR BLD AUTO: 0 % (ref 0–6)
ERYTHROCYTE [DISTWIDTH] IN BLOOD BY AUTOMATED COUNT: 15 % (ref 11.6–15.1)
GFR SERPL CREATININE-BSD FRML MDRD: 83 ML/MIN/1.73SQ M
GLUCOSE SERPL-MCNC: 102 MG/DL (ref 65–140)
HCT VFR BLD AUTO: 41.3 % (ref 36.5–49.3)
HDLC SERPL-MCNC: 52 MG/DL
HDLC SERPL-MCNC: 52 MG/DL
HGB BLD-MCNC: 14 G/DL (ref 12–17)
IMM GRANULOCYTES # BLD AUTO: 0.12 THOUSAND/UL (ref 0–0.2)
IMM GRANULOCYTES NFR BLD AUTO: 1 % (ref 0–2)
LDLC SERPL CALC-MCNC: 130 MG/DL (ref 0–100)
LDLC SERPL CALC-MCNC: 130 MG/DL (ref 0–100)
LIPASE SERPL-CCNC: 5201 U/L (ref 73–393)
LYMPHOCYTES # BLD AUTO: 0.79 THOUSANDS/ΜL (ref 0.6–4.47)
LYMPHOCYTES NFR BLD AUTO: 7 % (ref 14–44)
MCH RBC QN AUTO: 30.3 PG (ref 26.8–34.3)
MCHC RBC AUTO-ENTMCNC: 33.9 G/DL (ref 31.4–37.4)
MCV RBC AUTO: 89 FL (ref 82–98)
MONOCYTES # BLD AUTO: 1.66 THOUSAND/ΜL (ref 0.17–1.22)
MONOCYTES NFR BLD AUTO: 14 % (ref 4–12)
NEUTROPHILS # BLD AUTO: 8.87 THOUSANDS/ΜL (ref 1.85–7.62)
NEUTS SEG NFR BLD AUTO: 78 % (ref 43–75)
NONHDLC SERPL-MCNC: 140 MG/DL
NRBC BLD AUTO-RTO: 0 /100 WBCS
PLATELET # BLD AUTO: 184 THOUSANDS/UL (ref 149–390)
PMV BLD AUTO: 11.9 FL (ref 8.9–12.7)
POTASSIUM SERPL-SCNC: 3.7 MMOL/L (ref 3.5–5.3)
PROT SERPL-MCNC: 6.1 G/DL (ref 6.4–8.2)
RBC # BLD AUTO: 4.62 MILLION/UL (ref 3.88–5.62)
SODIUM SERPL-SCNC: 139 MMOL/L (ref 136–145)
TRIGL SERPL-MCNC: 48 MG/DL
TRIGL SERPL-MCNC: 48 MG/DL
WBC # BLD AUTO: 11.51 THOUSAND/UL (ref 4.31–10.16)

## 2021-11-04 PROCEDURE — 99233 SBSQ HOSP IP/OBS HIGH 50: CPT | Performed by: INTERNAL MEDICINE

## 2021-11-04 PROCEDURE — 36415 COLL VENOUS BLD VENIPUNCTURE: CPT | Performed by: NURSE PRACTITIONER

## 2021-11-04 PROCEDURE — 99223 1ST HOSP IP/OBS HIGH 75: CPT | Performed by: INTERNAL MEDICINE

## 2021-11-04 PROCEDURE — 85025 COMPLETE CBC W/AUTO DIFF WBC: CPT | Performed by: NURSE PRACTITIONER

## 2021-11-04 PROCEDURE — 76705 ECHO EXAM OF ABDOMEN: CPT

## 2021-11-04 PROCEDURE — 80053 COMPREHEN METABOLIC PANEL: CPT | Performed by: NURSE PRACTITIONER

## 2021-11-04 PROCEDURE — 80061 LIPID PANEL: CPT | Performed by: NURSE PRACTITIONER

## 2021-11-04 PROCEDURE — 80061 LIPID PANEL: CPT | Performed by: INTERNAL MEDICINE

## 2021-11-04 PROCEDURE — 83690 ASSAY OF LIPASE: CPT | Performed by: NURSE PRACTITIONER

## 2021-11-04 RX ORDER — ONDANSETRON 2 MG/ML
4 INJECTION INTRAMUSCULAR; INTRAVENOUS EVERY 6 HOURS PRN
Status: DISCONTINUED | OUTPATIENT
Start: 2021-11-04 | End: 2021-11-05 | Stop reason: HOSPADM

## 2021-11-04 RX ORDER — OXYCODONE HYDROCHLORIDE 5 MG/1
5 TABLET ORAL EVERY 4 HOURS PRN
Status: DISCONTINUED | OUTPATIENT
Start: 2021-11-04 | End: 2021-11-05 | Stop reason: HOSPADM

## 2021-11-04 RX ORDER — ASCORBIC ACID 500 MG
500 TABLET ORAL DAILY
Status: DISCONTINUED | OUTPATIENT
Start: 2021-11-04 | End: 2021-11-05 | Stop reason: HOSPADM

## 2021-11-04 RX ORDER — SODIUM CHLORIDE 9 MG/ML
125 INJECTION, SOLUTION INTRAVENOUS CONTINUOUS
Status: DISCONTINUED | OUTPATIENT
Start: 2021-11-04 | End: 2021-11-05 | Stop reason: HOSPADM

## 2021-11-04 RX ORDER — SIMETHICONE 80 MG
80 TABLET,CHEWABLE ORAL 4 TIMES DAILY PRN
Status: DISCONTINUED | OUTPATIENT
Start: 2021-11-04 | End: 2021-11-05 | Stop reason: HOSPADM

## 2021-11-04 RX ORDER — PANTOPRAZOLE SODIUM 40 MG/1
40 TABLET, DELAYED RELEASE ORAL
Status: DISCONTINUED | OUTPATIENT
Start: 2021-11-04 | End: 2021-11-05 | Stop reason: HOSPADM

## 2021-11-04 RX ORDER — PREDNISONE 2.5 MG
2.5 TABLET ORAL DAILY
Status: DISCONTINUED | OUTPATIENT
Start: 2021-11-04 | End: 2021-11-05 | Stop reason: HOSPADM

## 2021-11-04 RX ORDER — MELATONIN
1000 DAILY
Status: DISCONTINUED | OUTPATIENT
Start: 2021-11-04 | End: 2021-11-05 | Stop reason: HOSPADM

## 2021-11-04 RX ORDER — MAGNESIUM HYDROXIDE/ALUMINUM HYDROXICE/SIMETHICONE 120; 1200; 1200 MG/30ML; MG/30ML; MG/30ML
30 SUSPENSION ORAL EVERY 6 HOURS PRN
Status: DISCONTINUED | OUTPATIENT
Start: 2021-11-04 | End: 2021-11-05 | Stop reason: HOSPADM

## 2021-11-04 RX ORDER — HYDROMORPHONE HCL/PF 1 MG/ML
0.2 SYRINGE (ML) INJECTION
Status: DISCONTINUED | OUTPATIENT
Start: 2021-11-04 | End: 2021-11-05 | Stop reason: HOSPADM

## 2021-11-04 RX ORDER — SODIUM CHLORIDE 9 MG/ML
125 INJECTION, SOLUTION INTRAVENOUS CONTINUOUS
Status: DISCONTINUED | OUTPATIENT
Start: 2021-11-04 | End: 2021-11-04

## 2021-11-04 RX ORDER — ACETAMINOPHEN 325 MG/1
650 TABLET ORAL EVERY 6 HOURS PRN
Status: DISCONTINUED | OUTPATIENT
Start: 2021-11-04 | End: 2021-11-05 | Stop reason: HOSPADM

## 2021-11-04 RX ADMIN — ENOXAPARIN SODIUM 40 MG: 40 INJECTION SUBCUTANEOUS at 16:37

## 2021-11-04 RX ADMIN — PREDNISONE 2.5 MG: 2.5 TABLET ORAL at 12:53

## 2021-11-04 RX ADMIN — SODIUM CHLORIDE 200 ML/HR: 0.9 INJECTION, SOLUTION INTRAVENOUS at 12:43

## 2021-11-04 RX ADMIN — SODIUM CHLORIDE 125 ML/HR: 0.9 INJECTION, SOLUTION INTRAVENOUS at 01:09

## 2021-11-04 RX ADMIN — SODIUM CHLORIDE 200 ML/HR: 0.9 INJECTION, SOLUTION INTRAVENOUS at 06:01

## 2021-11-04 RX ADMIN — PANTOPRAZOLE SODIUM 40 MG: 40 TABLET, DELAYED RELEASE ORAL at 13:24

## 2021-11-04 RX ADMIN — ALUMINUM HYDROXIDE, MAGNESIUM HYDROXIDE, AND SIMETHICONE 30 ML: 200; 200; 20 SUSPENSION ORAL at 12:42

## 2021-11-04 RX ADMIN — SODIUM CHLORIDE 125 ML/HR: 0.9 INJECTION, SOLUTION INTRAVENOUS at 19:59

## 2021-11-04 NOTE — ED NOTES
Pt states he had abd pain yesterday which was relieved with TUMS  Pain stayed away for awhile, came back today, and pt made appointment for his PCP  Pt again took TUMS which relieved pain  Pt is pain-free at this time  Pt states pain seemed prompted by a meal in which he ate tuna tartar and veal       Encouraged pt to straighten arm so that IV infuses        Comfort measures offered to pt, pt reports feeling comfortable at this time     Jonel Ha RN  11/03/21 [id number]
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
## 2021-11-04 NOTE — ED NOTES
Pt provided phone to call physician office to reschedule his stress test scheduled for this morning        Tico Bentley RN  11/04/21 9913

## 2021-11-04 NOTE — ED NOTES
Pt ambulatory to BR with steady gait  Pt continues to report no current pain  Pt states he has been able to get some rest and is feeling comfortable at this time        Gina Michaels RN  11/04/21 4055

## 2021-11-04 NOTE — ED NOTES
Awake, alert, appears w/o distress  C/o mild epigastric pain at present  Denies nausea  IVF infusing  VS renewed        Jorge Britton RN  11/04/21 2729

## 2021-11-04 NOTE — ASSESSMENT & PLAN NOTE
· Presenting complaint epigastric pain  · CT A/P: Mild to moderate diffuse peripancreatic inflammatory stranding most compatible with acute pancreatitis  There is a 4 mm calcified calculus at the proximal pancreatic duct with no significant pancreatic ductal dilatation  No evidence of pancreatic pseudocyst or abscess  No gallstones seen  · Lipase >9700  · Will obtain lipid panel to assess triglycerides  · Bowel rest:  NPO, aggressive IV hydration  · Advance diet as tolerated  · P r n   Analgesics, IV hydromorphone  · P r n  antiemetics  · Monitor lipase  · GI consult

## 2021-11-04 NOTE — PROGRESS NOTES
[facility]  Progress Note - Erik Oneil 1950, 66 y o  male MRN: [id number]  Unit/Bed#: E2 -01 Encounter: [id number]  Primary Care Provider: Fernie Gaming PA-C   Date and time admitted to hospital: 11/3/2021  9:56 PM    Acute kidney injury [facility])  Assessment & Plan  · Creatinine 1 2, baseline 0 7-0 9  · IV hydration  Avoid nephrotoxins  Monitor BMP    Mild protein-calorie malnutrition (HCC)  Assessment & Plan  Malnutrition Findings: BMI Body mass index is 22 67 kg/m²  · Mild chronic malnutrition    PMR (polymyalgia rheumatica) (HCC)  Assessment & Plan  · Continue prednisone 2 5 mg daily    Mixed hyperlipidemia  Assessment & Plan  · Not maintained on statin  LFLC diet when diet advanced    * Idiopathic acute pancreatitis without infection or necrosis  Assessment & Plan  · Presenting complaint epigastric pain  · CT A/P: Mild to moderate diffuse peripancreatic inflammatory stranding most compatible with acute pancreatitis  There is a 4 mm calcified calculus at the proximal pancreatic duct with no significant pancreatic ductal dilatation  No evidence of pancreatic pseudocyst or abscess  No gallstones seen  · Lipase >9700  · Will obtain lipid panel to assess triglycerides  · Bowel rest:  NPO, aggressive IV hydration  · Advance diet as tolerated  · P r n  Analgesics, IV hydromorphone  · P r n  antiemetics  · Monitor lipase  · GI consult          VTE Pharmacologic Prophylaxis: VTE Score: 2 Moderate Risk (Score 3-4) - Pharmacological DVT Prophylaxis Ordered: enoxaparin (Lovenox)  Patient Centered Rounds: I performed bedside rounds with nursing staff today  Discussions with Specialists or Other Care Team Provider: nil    Education and Discussions with Family / Patient: Patient declined call to   Time Spent for Care: 30 minutes  More than 50% of total time spent on counseling and coordination of care as described above      Current Length of Stay: 0 day(s)  Current Patient Status: Inpatient   Certification Statement: The patient will continue to require additional inpatient hospital stay due to Acute pancreatitis  Discharge Plan: Anticipate discharge in 24-48 hrs to home  Code Status: Level 1 - Full Code    Subjective:   Patient is feeling better  No epigastric pain or chest pain shortness of breath  Denies of any nausea vomiting  Objective:     Vitals:   Temp (24hrs), Av °F (36 7 °C), Min:97 5 °F (36 4 °C), Max:98 3 °F (36 8 °C)    Temp:  [97 5 °F (36 4 °C)-98 3 °F (36 8 °C)] 97 5 °F (36 4 °C)  HR:  [] 77  Resp:  [18] 18  BP: (122-149)/() 135/74  SpO2:  [96 %-99 %] 98 %  Body mass index is 22 67 kg/m²  Input and Output Summary (last 24 hours): Intake/Output Summary (Last 24 hours) at 2021 1558  Last data filed at 2021 1243  Gross per 24 hour   Intake 2900 ml   Output --   Net 2900 ml       Physical Exam:   Physical Exam   HEENT-PERRLA, moist oral mucosa  Neck-supple, no JVD elevation   Respiratory-equal air entry bilaterally, no rales or rhonchi  Cardiovascular system-S1, S2 heard, no murmur or gallops or rubs  Abdomen-soft, nontender, no guarding or rigidity, bowel sounds heard  Extremities-no pedal edema  Peripheral pulses palpable  Musculoskeletal-no contractures  Central nervous system-no acute focal neurological deficit ,no sensory or motor deficit noted    Skin-no rash noted        Additional Data:     Labs:  Results from last 7 days   Lab Units 21  0909   WBC Thousand/uL 11 51*   HEMOGLOBIN g/dL 14 0   HEMATOCRIT % 41 3   PLATELETS Thousands/uL 184   NEUTROS PCT % 78*   LYMPHS PCT % 7*   MONOS PCT % 14*   EOS PCT % 0     Results from last 7 days   Lab Units 21  0909   SODIUM mmol/L 139   POTASSIUM mmol/L 3 7   CHLORIDE mmol/L 105   CO2 mmol/L 26   BUN mg/dL 17   CREATININE mg/dL 0 91   ANION GAP mmol/L 8   CALCIUM mg/dL 8 1*   ALBUMIN g/dL 3 3*   TOTAL BILIRUBIN mg/dL 0 57   ALK PHOS U/L 46   ALT U/L 19 AST U/L 13   GLUCOSE RANDOM mg/dL 102                       Lines/Drains:  Invasive Devices     Peripheral Intravenous Line            Peripheral IV 11/03/21 Right Forearm <1 day                      Imaging: Reviewed radiology reports from this admission including: abdominal/pelvic CT    Recent Cultures (last 7 days):         Last 24 Hours Medication List:   Current Facility-Administered Medications   Medication Dose Route Frequency Provider Last Rate    acetaminophen  650 mg Oral Q6H PRN Maddie Dorothy, CRNP      aluminum-magnesium hydroxide-simethicone  30 mL Oral Q6H PRN Maddie Dorothy, CRNP      ascorbic acid  500 mg Oral Daily Maddie Dorothy, CRNP      cholecalciferol  1,000 Units Oral Daily Maddie Dorothy, CRNP      HYDROmorphone  0 2 mg Intravenous Q3H PRN Maddie Dorothy, CRNP      ondansetron  4 mg Intravenous Q6H PRN Maddie Dorothy, CRNP      oxyCODONE  5 mg Oral Q4H PRN Maddie Dorothy, CRNP      pantoprazole  40 mg Oral Early Morning Dominguez Dover MD      predniSONE  2 5 mg Oral Daily Maddie Dorothy, CRNP      simethicone  80 mg Oral 4x Daily PRN Maddie Dorothy, CRNP      sodium chloride  200 mL/hr Intravenous Continuous Maddie Dorothy, CRNP 200 mL/hr (11/04/21 1243)        Today, Patient Was Seen By: Evita Oakley MD    **Please Note: This note may have been constructed using a voice recognition system  **

## 2021-11-04 NOTE — ED PROVIDER NOTES
History  Chief Complaint   Patient presents with    Abnormal Lab     pt reports PCP called him was told to come to ER for IV antibiotics for elevated Lipase  Denies any symptoms  67 yo male who presented earlier today to PCP office due to epigastric pain  States pain began last night a few hours after eating tuna tartare and veal, 1 glass wine; no dessert  Pt was sent for labs today and lipase was elevated at 10,200  Pt appears comfortable, no associated fever, chills, NVD, SOB or CP  Does not want anything for pain  History provided by:  Patient   used: No    Abdominal Pain  Pain location:  Epigastric  Pain quality: gnawing    Pain radiates to:  Does not radiate  Pain severity:  Moderate  Onset quality:  Gradual  Duration:  24 hours  Timing:  Constant  Progression:  Waxing and waning  Chronicity:  New  Context comment:  Began last night 2 hours after dinner - 1 glass wine  Relieved by:  Nothing  Worsened by:  Palpation  Ineffective treatments:  None tried  Associated symptoms: anorexia    Associated symptoms: no chest pain, no chills, no cough, no diarrhea, no dysuria, no fever, no hematuria, no nausea, no shortness of breath, no sore throat and no vomiting        Prior to Admission Medications   Prescriptions Last Dose Informant Patient Reported? Taking?    Ascorbic Acid (VITAMIN C) 500 MG CAPS  Self Yes Yes   Sig: Take 1 tablet by mouth daily   Cholecalciferol (VITAMIN D-3) 1000 units CAPS  Self Yes Yes   Sig: Take by mouth daily   lansoprazole (PREVACID) 30 mg capsule   No Yes   Sig: Take 1 capsule (30 mg total) by mouth daily In the am 30 min prior to eating   predniSONE 5 mg tablet  Self Yes No   vitamin E, tocopherol, (vitamin E) 400 units capsule  Self Yes Yes   Sig: Take by mouth daily      Facility-Administered Medications: None       Past Medical History:   Diagnosis Date    Abdominal pain     Abscess of right thigh     Last Assessed: 7/29/2016    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    BPH with obstruction/lower urinary tract symptoms     Closed compression fracture of sacrum (HCC)     Contusion of lung     Current chronic use of steroids     off 9/14    Elevated PSA     Incomplete bladder emptying     Inguinal hernia     Last Assessed: 11/7/2014    Kidney stone     Nephrolithiasis     Nocturia     Pneumothorax, right     Last Assessed: 12/30/2016    Polymyalgia rheumatica ([facility] [address] ) 2013    Rheumatic fever     on Pcn x yrs    Rib fracture     Right flank pain     Subdural hematoma ([facility] [address] ) 02/2010    Vocal cord paralysis     Weak urinary stream        Past Surgical History:   Procedure Laterality Date    COLONOSCOPY  2010    Normal   Biopsy showed collagenous colitis by Dr Boyd    ENDOTRACHEAL INTUBATION EMERGENT      FLEXIBLE SIGMOIDOSCOPY  2012    Normal biopsy showed normal tissue by Dr Tasha Chapman      throat Larynx Vocal Cord Mobility - twice    TRACHEOSTOMY      Emergency       Family History   Problem Relation Age of Onset    Diabetes Mother     Lung cancer Mother     Nephrolithiasis Father     Stomach cancer Maternal Grandmother     Cancer Family     Diabetes Family     Urolithiasis Family      I have reviewed and agree with the history as documented  E-Cigarette/Vaping    E-Cigarette Use Never User      E-Cigarette/Vaping Substances     Social History     Tobacco Use    Smoking status: Never Smoker    Smokeless tobacco: Never Used   Vaping Use    Vaping Use: Never used   Substance Use Topics    Alcohol use: Yes     Comment: Drinks socially; "a few beers a week"   Drug use: No       Review of Systems   Constitutional: Negative for chills and fever  HENT: Negative for congestion and sore throat  Eyes: Negative for visual disturbance  Respiratory: Negative for cough, shortness of breath and wheezing      Cardiovascular: Negative for chest pain and palpitations  Gastrointestinal: Positive for abdominal pain (epigastric) and anorexia  Negative for diarrhea, nausea and vomiting  Genitourinary: Negative for dysuria and hematuria  Musculoskeletal: Negative for neck pain and neck stiffness  Skin: Negative for pallor and rash  Neurological: Negative for headaches  Psychiatric/Behavioral: Negative for confusion  All other systems reviewed and are negative  Physical Exam  Physical Exam  Vitals and nursing note reviewed  Constitutional:       General: He is not in acute distress  Appearance: He is well-developed  HENT:      Head: Normocephalic and atraumatic  Right Ear: External ear normal       Left Ear: External ear normal       Mouth/Throat:      Mouth: Mucous membranes are moist    Eyes:      Extraocular Movements: Extraocular movements intact  Cardiovascular:      Rate and Rhythm: Regular rhythm  Tachycardia present  Heart sounds: No murmur heard  Pulmonary:      Effort: Pulmonary effort is normal       Breath sounds: Normal breath sounds  Abdominal:      General: Bowel sounds are normal  There is no distension  Palpations: Abdomen is soft  Tenderness: There is abdominal tenderness (epigastric)  There is no guarding or rebound  Musculoskeletal:         General: Normal range of motion  Cervical back: Neck supple  Skin:     General: Skin is warm  Coloration: Skin is not pale  Findings: No rash  Neurological:      Mental Status: He is alert and oriented to person, place, and time     Psychiatric:         Behavior: Behavior normal          Vital Signs  ED Triage Vitals   Temperature Pulse Respirations Blood Pressure SpO2   11/03/21 2154 11/03/21 2154 11/03/21 2154 11/03/21 2154 11/03/21 2154   98 3 °F (36 8 °C) (!) 109 18 139/100 96 %      Temp Source Heart Rate Source Patient Position - Orthostatic VS BP Location FiO2 (%)   11/03/21 2154 11/03/21 2154 11/03/21 2154 11/03/21 2154 --   Oral Monitor Sitting Right arm       Pain Score       11/04/21 0026       No Pain           Vitals:    11/03/21 2154 11/04/21 0026   BP: 139/100 129/66   Pulse: (!) 109 78   Patient Position - Orthostatic VS: Sitting Sitting         Visual Acuity      ED Medications  Medications   sodium chloride 0 9 % infusion (has no administration in time range)   sodium chloride 0 9 % bolus 1,000 mL (0 mL Intravenous Stopped 11/4/21 0026)   iohexol (OMNIPAQUE) 350 MG/ML injection (SINGLE-DOSE) 100 mL (100 mL Intravenous Given 11/3/21 2333)       Diagnostic Studies  Results Reviewed     Procedure Component Value Units Date/Time    Lipase [[id number]]  (Abnormal) Collected: 11/03/21 2229    Lab Status: Final result Specimen: Blood from Arm, Right Updated: 11/03/21 2314     Lipase 9,794 u/L     Comprehensive metabolic panel [[id number]] Collected: 11/03/21 2229    Lab Status: Final result Specimen: Blood from Arm, Right Updated: 11/03/21 2301     Sodium 137 mmol/L      Potassium 4 0 mmol/L      Chloride 100 mmol/L      CO2 24 mmol/L      ANION GAP 13 mmol/L      BUN 22 mg/dL      Creatinine 1 28 mg/dL      Glucose 130 mg/dL      Calcium 9 7 mg/dL      AST 17 U/L      ALT 24 U/L      Alkaline Phosphatase 65 U/L      Total Protein 8 0 g/dL      Albumin 4 4 g/dL      Total Bilirubin 0 64 mg/dL      eGFR 55 ml/min/1 73sq m     Narrative:      [place] guidelines for Chronic Kidney Disease (CKD):     Stage 1 with normal or high GFR (GFR > 90 mL/min/1 73 square meters)    Stage 2 Mild CKD (GFR = 60-89 mL/min/1 73 square meters)    Stage 3A Moderate CKD (GFR = 45-59 mL/min/1 73 square meters)    Stage 3B Moderate CKD (GFR = 30-44 mL/min/1 73 square meters)    Stage 4 Severe CKD (GFR = 15-29 mL/min/1 73 square meters)    Stage 5 End Stage CKD (GFR <15 mL/min/1 73 square meters)  Note: GFR calculation is accurate only with a steady state creatinine                 CT abdomen pelvis with contrast   Final Result by uDlce Cope MD (11/03 6411)      Mild to moderate diffuse peripancreatic inflammatory stranding most compatible with acute pancreatitis  There is a 4 mm calcified calculus at the proximal pancreatic duct with no significant pancreatic ductal dilatation  No evidence of pancreatic pseudocyst or abscess  Mild prostatomegaly  Workstation performed: [id number]                    Procedures  Procedures         ED Course  ED Course as of Nov 04 0108   Wed Nov 03, 2021   2151 Pt seen and examined  65 yo male who presented earlier today to PCP office due to epigastric pain  States pain began last night a few hours after eating tuna tartare and veal, 1 glass wine; no dessert  Pt was sent for labs today and lipase was elevated at 10,200  Pt appears comfortable, no associated fever, chills, NVD, SOB or CP  Does not want anything for pain  Will give IVF, keep NPO and check CT a/p       2314 Lipase 9794 - taken for CT a/p  Thu Nov 04, 2021   0008 CT a/p - Mild to moderate diffuse peripancreatic inflammatory stranding most compatible with acute pancreatitis  There is a 4 mm calcified calculus at the proximal pancreatic duct with no significant pancreatic ductal dilatation  No evidence of pancreatic pseudocyst or abscess  Mild prostatomegaly  SBIRT 20yo+      Most Recent Value   SBIRT (22 yo +)   In order to provide better care to our patients, we are screening all of our patients for alcohol and drug use  Would it be okay to ask you these screening questions?   No Filed at: 11/03/2021 2244                    MDM    Disposition  Final diagnoses:   Acute pancreatitis     Time reflects when diagnosis was documented in both MDM as applicable and the Disposition within this note     Time User Action Codes Description Comment    11/4/2021 12:23 AM Farhan Faith Add [K85 [id number]] Acute pancreatitis       ED Disposition     ED Disposition Condition Date/Time Comment    Admit Stable u Nov 4, 2021 12:23 AM Case was discussed with Dariusz Camarillo and the patient's admission status was agreed to be Admission Status: inpatient status to the service of Dr Stinson Brood   Follow-up Information    None         Patient's Medications   Discharge Prescriptions    No medications on file     No discharge procedures on file      PDMP Review     None          ED Provider  Electronically Signed by           Ricardo Andre DO  11/04/21 0141

## 2021-11-04 NOTE — H&P
[facility]  H&P- Jeremy Tejada 1950, 66 y o  male MRN: [id number]  Unit/Bed#: ED 09 Encounter: [id number]  Primary Care Provider: Madeline Rosado PA-C   Date and time admitted to hospital: 11/3/2021  9:56 PM    * Idiopathic acute pancreatitis without infection or necrosis  Assessment & Plan  · Presenting complaint epigastric pain  · CT A/P: Mild to moderate diffuse peripancreatic inflammatory stranding most compatible with acute pancreatitis  There is a 4 mm calcified calculus at the proximal pancreatic duct with no significant pancreatic ductal dilatation  No evidence of pancreatic pseudocyst or abscess  No gallstones seen  · Lipase >9700  · Will obtain lipid panel to assess triglycerides  · Bowel rest:  NPO, aggressive IV hydration  · Advance diet as tolerated  · P r n  Analgesics, IV hydromorphone  · P r n  antiemetics  · Monitor lipase  · GI consult    Acute kidney injury ([address] )  Assessment & Plan  · Creatinine 1 2, baseline 0 7-0 9  · IV hydration  Avoid nephrotoxins  Monitor BMP    Mild protein-calorie malnutrition (HCC)  Assessment & Plan  Malnutrition Findings: BMI Body mass index is 22 67 kg/m²  · Mild chronic malnutrition    PMR (polymyalgia rheumatica) (HCC)  Assessment & Plan  · Continue prednisone 2 5 mg daily    Mixed hyperlipidemia  Assessment & Plan  · Not maintained on statin  LFLC diet when diet advanced    VTE Prophylaxis: Low risk  / reason for no mechanical VTE prophylaxis Ambulate   Code Status:  Full code  POLST: POLST is not applicable to this patient  Discussion with family:     Anticipated Length of Stay:  Patient will be admitted on an Inpatient basis with an anticipated length of stay of  > 2 midnights  Justification for Hospital Stay:  Pancreatitis    Total Time for Visit, including Counseling / Coordination of Care: 60 minutes  Greater than 50% of this total time spent on direct patient counseling and coordination of care      Chief Complaint: 'abdominal pain'    History of Present Illness:    Emmy Lindo is a 66 y o  male who presents with c/o epigastric pain  Reports he went out for dinner, ate veal, tuna tartare and drank one glass of wine  He felt epigastric pain so he went to see his PMD who suspected gastritis  Stat labs ordered, lipase >10,000  PMD called and advised he go to the emergency room  Review of Systems:    Review of Systems   Constitutional: Negative  HENT: Negative  Respiratory: Negative  Cardiovascular: Negative  Gastrointestinal: Positive for abdominal pain  Negative for constipation, diarrhea, nausea and vomiting  Genitourinary: Negative  Musculoskeletal: Negative  Chronic arthralgias   Skin: Negative  Neurological: Negative  Psychiatric/Behavioral: Negative          Past Medical and Surgical History:     Past Medical History:   Diagnosis Date    Abdominal pain     Abscess of right thigh     Last Assessed: 7/29/2016    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    BPH with obstruction/lower urinary tract symptoms     Closed compression fracture of sacrum (HCC)     Contusion of lung     Current chronic use of steroids     off 9/14    Elevated PSA     Incomplete bladder emptying     Inguinal hernia     Last Assessed: 11/7/2014    Kidney stone     Nephrolithiasis     Nocturia     Pneumothorax, right     Last Assessed: 12/30/2016    Polymyalgia rheumatica ([facility] ) 2013    Rheumatic fever     on Pcn x yrs    Rib fracture     Right flank pain     Subdural hematoma ([facility] ) 02/2010    Vocal cord paralysis     Weak urinary stream        Past Surgical History:   Procedure Laterality Date    COLONOSCOPY  2010    Normal   Biopsy showed collagenous colitis by Dr Boyd    ENDOTRACHEAL INTUBATION EMERGENT      FLEXIBLE SIGMOIDOSCOPY  2012    Normal biopsy showed normal tissue by Dr Sara Robles      throat Larynx Vocal Cord Mobility - twice    TRACHEOSTOMY      Emergency       Meds/Allergies:    Prior to Admission medications    Medication Sig Start Date End Date Taking? Authorizing Provider   Ascorbic Acid (VITAMIN C) 500 MG CAPS Take 1 tablet by mouth daily   Yes Historical Provider, MD   Cholecalciferol (VITAMIN D-3) 1000 units CAPS Take by mouth daily   Yes Historical Provider, MD   lansoprazole (PREVACID) 30 mg capsule Take 1 capsule (30 mg total) by mouth daily In the am 30 min prior to eating 11/3/21  Yes Desiree Bennett,    vitamin E, tocopherol, (vitamin E) 400 units capsule Take by mouth daily   Yes Historical Provider, MD   predniSONE 5 mg tablet  10/26/20   Historical Provider, MD     I have reviewed home medications with patient personally  Allergies: Allergies   Allergen Reactions    Influenza Vaccines Dizziness    Zoloft [Sertraline]        Social History:     Marital Status:    Occupation: retired; 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  Patient Pre-hospital Living Situation:  Resides alone  Patient Pre-hospital Level of Mobility:  Ambulatory  Patient Pre-hospital Diet Restrictions:   Substance Use History:   Social History     Substance and Sexual Activity   Alcohol Use Yes    Comment: Drinks socially; "a few beers a week"       Social History     Tobacco Use   Smoking Status Never Smoker   Smokeless Tobacco Never Used     Social History     Substance and Sexual Activity   Drug Use No       Family History:    Family History   Problem Relation Age of Onset    Diabetes Mother     Lung cancer Mother     Nephrolithiasis Father     Stomach cancer Maternal Grandmother     Cancer Family     Diabetes Family     Urolithiasis Family        Physical Exam:     Vitals:   Blood Pressure: 129/66 (11/04/21 0026)  Pulse: 78 (11/04/21 0026)  Temperature: 98 3 °F (36 8 °C) (11/03/21 2154)  Temp Source: Oral (11/03/21 2154)  Respirations: 18 (11/04/21 0026)  Height: 5' 6" (167 6 cm) (11/03/21 2152)  Weight - Scale: 63 7 kg (140 lb 6 9 oz) (11/03/21 2152)  SpO2: 99 % (11/04/21 0026)    Physical Exam  Constitutional:       General: He is not in acute distress  Appearance: Normal appearance  He is not ill-appearing, toxic-appearing or diaphoretic  Comments: Under weight   HENT:      Head: Normocephalic and atraumatic  Mouth/Throat:      Pharynx: Oropharynx is clear  Eyes:      General: No scleral icterus  Extraocular Movements: Extraocular movements intact  Conjunctiva/sclera: Conjunctivae normal       Comments: Eye glasses   Cardiovascular:      Rate and Rhythm: Normal rate and regular rhythm  Heart sounds: Normal heart sounds  Pulmonary:      Effort: Pulmonary effort is normal       Breath sounds: Normal breath sounds  Abdominal:      General: Bowel sounds are normal  There is no distension  Palpations: Abdomen is soft  Tenderness: There is abdominal tenderness  There is no guarding  Comments: Mild reproducible tenderness over epigastric region   Musculoskeletal:         General: No tenderness  Right lower leg: No edema  Left lower leg: No edema  Skin:     General: Skin is warm  Coloration: Skin is not jaundiced or pale  Neurological:      Mental Status: He is alert  Psychiatric:         Mood and Affect: Mood normal          Behavior: Behavior normal          Thought Content: Thought content normal          Judgment: Judgment normal      Additional Data:     Lab Results: I have personally reviewed pertinent reports        Results from last 7 days   Lab Units 11/03/21  1725   WBC Thousand/uL 13 21*   HEMOGLOBIN g/dL 16 5   HEMATOCRIT % 50 1*   PLATELETS Thousands/uL 230   NEUTROS PCT % 77*   LYMPHS PCT % 9*   MONOS PCT % 13*   EOS PCT % 0     Results from last 7 days   Lab Units 11/03/21  2229   SODIUM mmol/L 137   POTASSIUM mmol/L 4 0   CHLORIDE mmol/L 100   CO2 mmol/L 24   BUN mg/dL 22   CREATININE mg/dL 1 28   ANION GAP mmol/L 13   CALCIUM mg/dL 9 7   ALBUMIN g/dL 4  4   TOTAL BILIRUBIN mg/dL 0 64   ALK PHOS U/L 65   ALT U/L 24   AST U/L 17   GLUCOSE RANDOM mg/dL 130                       Imaging: I have personally reviewed pertinent reports  CT abdomen pelvis with contrast   Final Result by Betina Ramos MD (11/03 2346)      Mild to moderate diffuse peripancreatic inflammatory stranding most compatible with acute pancreatitis  There is a 4 mm calcified calculus at the proximal pancreatic duct with no significant pancreatic ductal dilatation  No evidence of pancreatic pseudocyst or abscess  Mild prostatomegaly  Workstation performed: [id number]             EKG, Pathology, and Other Studies Reviewed on Admission:   · CT    Allscripts / Epic Records Reviewed: Yes     ** Please Note: This note has been constructed using a voice recognition system   **

## 2021-11-04 NOTE — ED NOTES
No c/o GI symptoms after consuming clear liquid diet  Prepared for IP admission        Tina Schreiber RN  11/04/21 5418

## 2021-11-05 VITALS
TEMPERATURE: 97.9 F | BODY MASS INDEX: 22.85 KG/M2 | SYSTOLIC BLOOD PRESSURE: 132 MMHG | HEIGHT: 66 IN | HEART RATE: 66 BPM | WEIGHT: 142.2 LBS | OXYGEN SATURATION: 97 % | RESPIRATION RATE: 18 BRPM | DIASTOLIC BLOOD PRESSURE: 80 MMHG

## 2021-11-05 LAB
ANION GAP SERPL CALCULATED.3IONS-SCNC: 6 MMOL/L (ref 4–13)
BUN SERPL-MCNC: 10 MG/DL (ref 5–25)
CALCIUM SERPL-MCNC: 8.4 MG/DL (ref 8.3–10.1)
CHLORIDE SERPL-SCNC: 109 MMOL/L (ref 100–108)
CO2 SERPL-SCNC: 26 MMOL/L (ref 21–32)
CREAT SERPL-MCNC: 0.83 MG/DL (ref 0.6–1.3)
ERYTHROCYTE [DISTWIDTH] IN BLOOD BY AUTOMATED COUNT: 15.1 % (ref 11.6–15.1)
GFR SERPL CREATININE-BSD FRML MDRD: 87 ML/MIN/1.73SQ M
GLUCOSE SERPL-MCNC: 98 MG/DL (ref 65–140)
HCT VFR BLD AUTO: 37.1 % (ref 36.5–49.3)
HGB BLD-MCNC: 12.3 G/DL (ref 12–17)
LIPASE SERPL-CCNC: 1929 U/L (ref 73–393)
MCH RBC QN AUTO: 29.9 PG (ref 26.8–34.3)
MCHC RBC AUTO-ENTMCNC: 33.2 G/DL (ref 31.4–37.4)
MCV RBC AUTO: 90 FL (ref 82–98)
PLATELET # BLD AUTO: 153 THOUSANDS/UL (ref 149–390)
PMV BLD AUTO: 11.5 FL (ref 8.9–12.7)
POTASSIUM SERPL-SCNC: 3.6 MMOL/L (ref 3.5–5.3)
RBC # BLD AUTO: 4.12 MILLION/UL (ref 3.88–5.62)
SODIUM SERPL-SCNC: 141 MMOL/L (ref 136–145)
WBC # BLD AUTO: 12.88 THOUSAND/UL (ref 4.31–10.16)

## 2021-11-05 PROCEDURE — 99239 HOSP IP/OBS DSCHRG MGMT >30: CPT | Performed by: PHYSICIAN ASSISTANT

## 2021-11-05 PROCEDURE — 83690 ASSAY OF LIPASE: CPT | Performed by: INTERNAL MEDICINE

## 2021-11-05 PROCEDURE — 80048 BASIC METABOLIC PNL TOTAL CA: CPT | Performed by: INTERNAL MEDICINE

## 2021-11-05 PROCEDURE — 85027 COMPLETE CBC AUTOMATED: CPT | Performed by: INTERNAL MEDICINE

## 2021-11-05 RX ADMIN — SODIUM CHLORIDE 125 ML/HR: 0.9 INJECTION, SOLUTION INTRAVENOUS at 05:08

## 2021-11-05 RX ADMIN — OXYCODONE HYDROCHLORIDE AND ACETAMINOPHEN 500 MG: 500 TABLET ORAL at 08:31

## 2021-11-05 RX ADMIN — PANTOPRAZOLE SODIUM 40 MG: 40 TABLET, DELAYED RELEASE ORAL at 05:07

## 2021-11-05 RX ADMIN — Medication 1000 UNITS: at 08:31

## 2021-11-05 RX ADMIN — PREDNISONE 2.5 MG: 2.5 TABLET ORAL at 08:31

## 2021-11-05 RX ADMIN — ENOXAPARIN SODIUM 40 MG: 40 INJECTION SUBCUTANEOUS at 08:31

## 2021-11-05 NOTE — PLAN OF CARE
Problem: PAIN - ADULT  Goal: Verbalizes/displays adequate comfort level or baseline comfort level  Description: Interventions:  - Encourage patient to monitor pain and request assistance  - Assess pain using appropriate pain scale  - Administer analgesics based on type and severity of pain and evaluate response  - Implement non-pharmacological measures as appropriate and evaluate response  - Consider cultural and social influences on pain and pain management  - Notify physician/advanced practitioner if interventions unsuccessful or patient reports new pain  Outcome: Progressing     Problem: DISCHARGE PLANNING  Goal: Discharge to home or other facility with appropriate resources  Description: INTERVENTIONS:  - Identify barriers to discharge w/patient and caregiver  - Arrange for needed discharge resources and transportation as appropriate  - Identify discharge learning needs (meds, wound care, etc )  - Arrange for interpretive services to assist at discharge as needed  - Refer to Case Management Department for coordinating discharge planning if the patient needs post-hospital services based on physician/advanced practitioner order or complex needs related to functional status, cognitive ability, or social support system  Outcome: Progressing     Problem: Knowledge Deficit  Goal: Patient/family/caregiver demonstrates understanding of disease process, treatment plan, medications, and discharge instructions  Description: Complete learning assessment and assess knowledge base    Interventions:  - Provide teaching at level of understanding  - Provide teaching via preferred learning methods  Outcome: Progressing     Problem: GASTROINTESTINAL - ADULT  Goal: Maintains adequate nutritional intake  Description: INTERVENTIONS:  - Monitor percentage of each meal consumed  - Identify factors contributing to decreased intake, treat as appropriate  - Assist with meals as needed  - Monitor I&O, weight, and lab values if indicated  - Obtain nutrition services referral as needed  Outcome: Progressing

## 2021-11-05 NOTE — ASSESSMENT & PLAN NOTE
· Presenting complaint epigastric pain  · CT A/P: Mild to moderate diffuse peripancreatic inflammatory stranding most compatible with acute pancreatitis  There is a 4 mm calcified calculus at the proximal pancreatic duct with no significant pancreatic ductal dilatation  No evidence of pancreatic pseudocyst or abscess  No gallstones seen    · Lipase improved since admission  · Triglycerides WNL  · Tolerating full liquid diet this morning, advanced to low-fat  · Seen in consult by GI  · Plan for outpatient follow-up with EUS  · Right upper quadrant ultrasound showing essentially unremarkable right upper quadrant, pancreatic head slightly obscured by overlying bowel gas

## 2021-11-05 NOTE — DISCHARGE SUMMARY
[facility]  Discharge- Nena Forget 1950, 66 y o  male MRN: [id number]  Unit/Bed#: E2 -01 Encounter: [id number]  Primary Care Provider: Roberta Lamar PA-C   Date and time admitted to hospital: 11/3/2021  9:56 PM    * Idiopathic acute pancreatitis without infection or necrosis  Assessment & Plan  · Presenting complaint epigastric pain  · CT A/P: Mild to moderate diffuse peripancreatic inflammatory stranding most compatible with acute pancreatitis  There is a 4 mm calcified calculus at the proximal pancreatic duct with no significant pancreatic ductal dilatation  No evidence of pancreatic pseudocyst or abscess  No gallstones seen  · Lipase improved since admission  · Triglycerides WNL  · Tolerating full liquid diet this morning, advanced to low-fat  · Seen in consult by GI  · Plan for outpatient follow-up with EUS  · Right upper quadrant ultrasound showing essentially unremarkable right upper quadrant, pancreatic head slightly obscured by overlying bowel gas    Acute kidney injury ([address] )  Assessment & Plan  · Creatinine 1 2 at admission, baseline 0 7-0 9  · Resolved    Mild protein-calorie malnutrition (HCC)  Assessment & Plan  Malnutrition Findings: BMI Body mass index is 22 95 kg/m²  · Mild chronic malnutrition    PMR (polymyalgia rheumatica) (HCC)  Assessment & Plan  · Continue prednisone 2 5 mg daily    Mixed hyperlipidemia  Assessment & Plan  · Not maintained on statin    Continue low-fat diet      Medical Problems     Resolved Problems  Date Reviewed: 11/5/2021    None              Discharging Physician / Practitioner: Stevphen Libman, PA-C  PCP: Roberta Lamar PA-C  Admission Date:   Admission Orders (From admission, onward)     Ordered        11/04/21 0023  Inpatient Admission  Once                   Discharge Date: 11/05/21    Consultations During Hospital Stay:  · Gastroenterology    Procedures Performed:   US right upper quadrant    Result Date: 11/4/2021  Impression: 1  Essentially unremarkable right upper quadrant ultrasound  The pancreatic head is largely obscured by overlying bowel gas, the calcification noted on previous day's CT is not definitively seen  Workstation performed: [id number]     CT abdomen pelvis with contrast    Result Date: 11/3/2021  Impression: Mild to moderate diffuse peripancreatic inflammatory stranding most compatible with acute pancreatitis  There is a 4 mm calcified calculus at the proximal pancreatic duct with no significant pancreatic ductal dilatation  No evidence of pancreatic pseudocyst or abscess  Mild prostatomegaly  Workstation performed: [id number]       Significant Findings / Test Results:   · See above    Incidental Findings:   · See above     Test Results Pending at Discharge (will require follow up): · None     Outpatient Tests Requested:  · Outpatient GI follow-up with outpatient EUS    Complications:  None    Reason for Admission:  Abdominal pain/pancreatitis    Hospital Course:   Nena Rodríguez is a 66 y o  male patient who originally presented to the hospital on 11/3/2021 due to epigastric abdominal pain  He was on has significantly elevated lipase and admitted for pancreatitis  He was placed NPO on IV fluids  He received p r n  Analgesics  He ultimately improved with supportive measures  Was seen in consult by Gastroenterology  Was tolerating oral diet on day of discharge  Plan for outpatient GI follow-up at discharge  Please see above list of diagnoses and related plan for additional information  Condition at Discharge: stable    Discharge Day Visit / Exam:   Subjective:  Patient notes he is doing well today  Was able to tolerate his breakfast this morning without difficulty  Denies any abdominal pain, nausea or vomiting  Feels comfortable returning home today    Vitals: Blood Pressure: 132/80 (11/05/21 0731)  Pulse: 66 (11/05/21 0731)  Temperature: 97 9 °F (36 6 °C) (11/05/21 8490)  Temp Source: Temporal (11/05/21 0731)  Respirations: 18 (11/05/21 0731)  Height: 5' 6" (167 6 cm) (11/03/21 2152)  Weight - Scale: 64 5 kg (142 lb 3 2 oz) (11/04/21 1837)  SpO2: 97 % (11/05/21 0731)  Exam:   Physical Exam  Vitals reviewed  Constitutional:       General: He is not in acute distress  HENT:      Head: Normocephalic and atraumatic  Eyes:      General: No scleral icterus  Conjunctiva/sclera: Conjunctivae normal    Cardiovascular:      Rate and Rhythm: Normal rate and regular rhythm  Heart sounds: No murmur heard  Pulmonary:      Effort: Pulmonary effort is normal  No respiratory distress  Breath sounds: Normal breath sounds  Abdominal:      General: Bowel sounds are normal  There is no distension  Palpations: Abdomen is soft  Tenderness: There is no abdominal tenderness  Musculoskeletal:      Cervical back: Neck supple  Right lower leg: No edema  Left lower leg: No edema  Skin:     General: Skin is warm and dry  Neurological:      Mental Status: He is alert and oriented to person, place, and time  Psychiatric:         Mood and Affect: Mood normal          Behavior: Behavior normal             Discharge instructions/Information to patient and family:   See after visit summary for information provided to patient and family  Provisions for Follow-Up Care:  See after visit summary for information related to follow-up care and any pertinent home health orders  Disposition:   Home    Planned Readmission:  None     Discharge Statement:  I spent 35 minutes discharging the patient  This time was spent on the day of discharge  I had direct contact with the patient on the day of discharge  Greater than 50% of the total time was spent examining patient, answering all patient questions, arranging and discussing plan of care with patient as well as directly providing post-discharge instructions    Additional time then spent on discharge activities  Discharge Medications:  See after visit summary for reconciled discharge medications provided to patient and/or family        **Please Note: This note may have been constructed using a voice recognition system**

## 2021-11-05 NOTE — PLAN OF CARE
Problem: PAIN - ADULT  Goal: Verbalizes/displays adequate comfort level or baseline comfort level  Description: Interventions:  - Encourage patient to monitor pain and request assistance  - Assess pain using appropriate pain scale  - Administer analgesics based on type and severity of pain and evaluate response  - Implement non-pharmacological measures as appropriate and evaluate response  - Consider cultural and social influences on pain and pain management  - Notify physician/advanced practitioner if interventions unsuccessful or patient reports new pain  11/4/2021 2112 by Nayana Chirinos RN  Outcome: Progressing  11/4/2021 2111 by Nayana Chirinos RN  Outcome: Progressing     Problem: DISCHARGE PLANNING  Goal: Discharge to home or other facility with appropriate resources  Description: INTERVENTIONS:  - Identify barriers to discharge w/patient and caregiver  - Arrange for needed discharge resources and transportation as appropriate  - Identify discharge learning needs (meds, wound care, etc )  - Arrange for interpretive services to assist at discharge as needed  - Refer to Case Management Department for coordinating discharge planning if the patient needs post-hospital services based on physician/advanced practitioner order or complex needs related to functional status, cognitive ability, or social support system  11/4/2021 2112 by Nayana Chirinos RN  Outcome: Progressing  11/4/2021 2111 by Nayana Chirinos RN  Outcome: Progressing     Problem: GASTROINTESTINAL - ADULT  Goal: Maintains adequate nutritional intake  Description: INTERVENTIONS:  - Monitor percentage of each meal consumed  - Identify factors contributing to decreased intake, treat as appropriate  - Assist with meals as needed  - Monitor I&O, weight, and lab values if indicated  - Obtain nutrition services referral as needed  Outcome: Progressing     Problem: Knowledge Deficit  Goal: Patient/family/caregiver demonstrates understanding of disease process, treatment plan, medications, and discharge instructions  Description: Complete learning assessment and assess knowledge base    Interventions:  - Provide teaching at level of understanding  - Provide teaching via preferred learning methods  11/4/2021 2112 by Mayda Andrews RN  Outcome: Progressing  11/4/2021 2111 by Mayda Andrews, RN  Outcome: Progressing

## 2021-11-06 RX ORDER — OMEPRAZOLE 40 MG/1
40 CAPSULE, DELAYED RELEASE ORAL DAILY
Qty: 30 CAPSULE | Refills: 0 | Status: SHIPPED | OUTPATIENT
Start: 2021-11-06 | End: 2021-12-06

## 2021-11-07 NOTE — RESULT ENCOUNTER NOTE
Late note - 11/3/2021 around 2000 - I had rec'd pt's results and his lipase was over 10,000   I immediately called pt to let him know - along with elevated wbc count  Given clinical picture from pt's history earlier in the day - and abnormal labs I advised immediate ED visit  Pt was in agreement  Note - I asked for him to have his girlfriend drive him over as he was not in acute distress at time of visit earlier in the day or at time of call - to warrant ambulance ride  I tried to call ahead to ED - but was unable to get thru  I notified pt of this and asked him to tell the ED his PCP sent him over for abnormal labs  Pt was admitted    Will send as fyi to pcp - Isac Castro

## 2021-11-08 ENCOUNTER — TELEPHONE (OUTPATIENT)
Dept: GASTROENTEROLOGY | Facility: MEDICAL CENTER | Age: 71
End: 2021-11-08

## 2021-11-09 ENCOUNTER — TRANSITIONAL CARE MANAGEMENT (OUTPATIENT)
Dept: FAMILY MEDICINE CLINIC | Facility: CLINIC | Age: 71
End: 2021-11-09

## 2021-11-18 ENCOUNTER — OFFICE VISIT (OUTPATIENT)
Dept: FAMILY MEDICINE CLINIC | Facility: CLINIC | Age: 71
End: 2021-11-18
Payer: COMMERCIAL

## 2021-11-18 VITALS
RESPIRATION RATE: 18 BRPM | HEIGHT: 66 IN | OXYGEN SATURATION: 98 % | BODY MASS INDEX: 22.66 KG/M2 | SYSTOLIC BLOOD PRESSURE: 130 MMHG | WEIGHT: 141 LBS | DIASTOLIC BLOOD PRESSURE: 70 MMHG | HEART RATE: 72 BPM

## 2021-11-18 DIAGNOSIS — K85.00 IDIOPATHIC ACUTE PANCREATITIS WITHOUT INFECTION OR NECROSIS: Primary | ICD-10-CM

## 2021-11-18 DIAGNOSIS — Z12.11 ENCOUNTER FOR SCREENING COLONOSCOPY: ICD-10-CM

## 2021-11-18 DIAGNOSIS — R97.20 ELEVATED PROSTATE SPECIFIC ANTIGEN (PSA): ICD-10-CM

## 2021-11-18 DIAGNOSIS — R94.31 ABNORMAL ECG: ICD-10-CM

## 2021-11-18 PROCEDURE — 3008F BODY MASS INDEX DOCD: CPT | Performed by: PHYSICIAN ASSISTANT

## 2021-11-18 PROCEDURE — 99495 TRANSJ CARE MGMT MOD F2F 14D: CPT | Performed by: FAMILY MEDICINE

## 2021-11-18 PROCEDURE — 93000 ELECTROCARDIOGRAM COMPLETE: CPT | Performed by: FAMILY MEDICINE

## 2021-11-18 PROCEDURE — 1111F DSCHRG MED/CURRENT MED MERGE: CPT | Performed by: FAMILY MEDICINE

## 2021-11-22 ENCOUNTER — LAB (OUTPATIENT)
Dept: LAB | Facility: MEDICAL CENTER | Age: 71
End: 2021-11-22
Payer: COMMERCIAL

## 2021-11-22 ENCOUNTER — OFFICE VISIT (OUTPATIENT)
Dept: GASTROENTEROLOGY | Facility: MEDICAL CENTER | Age: 71
End: 2021-11-22
Payer: COMMERCIAL

## 2021-11-22 VITALS
DIASTOLIC BLOOD PRESSURE: 66 MMHG | SYSTOLIC BLOOD PRESSURE: 168 MMHG | TEMPERATURE: 98.7 F | WEIGHT: 141 LBS | HEART RATE: 71 BPM | BODY MASS INDEX: 22.76 KG/M2

## 2021-11-22 DIAGNOSIS — K85.00 IDIOPATHIC ACUTE PANCREATITIS WITHOUT INFECTION OR NECROSIS: ICD-10-CM

## 2021-11-22 DIAGNOSIS — K85.00 IDIOPATHIC ACUTE PANCREATITIS WITHOUT INFECTION OR NECROSIS: Primary | ICD-10-CM

## 2021-11-22 PROCEDURE — 82784 ASSAY IGA/IGD/IGG/IGM EACH: CPT

## 2021-11-22 PROCEDURE — 1036F TOBACCO NON-USER: CPT | Performed by: PHYSICIAN ASSISTANT

## 2021-11-22 PROCEDURE — 99213 OFFICE O/P EST LOW 20 MIN: CPT | Performed by: PHYSICIAN ASSISTANT

## 2021-11-22 PROCEDURE — 36415 COLL VENOUS BLD VENIPUNCTURE: CPT

## 2021-11-22 PROCEDURE — 82787 IGG 1 2 3 OR 4 EACH: CPT

## 2021-11-22 PROCEDURE — 1160F RVW MEDS BY RX/DR IN RCRD: CPT | Performed by: PHYSICIAN ASSISTANT

## 2021-11-22 PROCEDURE — 1111F DSCHRG MED/CURRENT MED MERGE: CPT | Performed by: PHYSICIAN ASSISTANT

## 2021-11-24 LAB
IGG SERPL-MCNC: 754 MG/DL (ref 603–1613)
IGG1 SER-MCNC: 350 MG/DL (ref 248–810)
IGG2 SER-MCNC: 324 MG/DL (ref 130–555)
IGG3 SER-MCNC: 20 MG/DL (ref 15–102)
IGG4 SER-MCNC: 25 MG/DL (ref 2–96)

## 2021-12-06 DIAGNOSIS — K29.00 ACUTE GASTRITIS WITHOUT HEMORRHAGE, UNSPECIFIED GASTRITIS TYPE: ICD-10-CM

## 2021-12-06 RX ORDER — OMEPRAZOLE 40 MG/1
CAPSULE, DELAYED RELEASE ORAL
Qty: 30 CAPSULE | Refills: 0 | Status: SHIPPED | OUTPATIENT
Start: 2021-12-06 | End: 2022-02-22

## 2021-12-13 ENCOUNTER — TELEPHONE (OUTPATIENT)
Dept: GASTROENTEROLOGY | Facility: CLINIC | Age: 71
End: 2021-12-13

## 2021-12-28 ENCOUNTER — OFFICE VISIT (OUTPATIENT)
Dept: UROLOGY | Facility: MEDICAL CENTER | Age: 71
End: 2021-12-28
Payer: COMMERCIAL

## 2021-12-28 ENCOUNTER — TELEPHONE (OUTPATIENT)
Dept: UROLOGY | Facility: AMBULATORY SURGERY CENTER | Age: 71
End: 2021-12-28

## 2021-12-28 VITALS
WEIGHT: 141 LBS | SYSTOLIC BLOOD PRESSURE: 138 MMHG | BODY MASS INDEX: 22.66 KG/M2 | DIASTOLIC BLOOD PRESSURE: 80 MMHG | HEIGHT: 66 IN

## 2021-12-28 DIAGNOSIS — R97.20 ELEVATED PROSTATE SPECIFIC ANTIGEN (PSA): ICD-10-CM

## 2021-12-28 DIAGNOSIS — N13.8 BPH WITH URINARY OBSTRUCTION: Primary | ICD-10-CM

## 2021-12-28 DIAGNOSIS — N40.1 BPH WITH URINARY OBSTRUCTION: Primary | ICD-10-CM

## 2021-12-28 PROCEDURE — 99214 OFFICE O/P EST MOD 30 MIN: CPT | Performed by: UROLOGY

## 2021-12-28 PROCEDURE — 3008F BODY MASS INDEX DOCD: CPT | Performed by: UROLOGY

## 2021-12-28 PROCEDURE — 1036F TOBACCO NON-USER: CPT | Performed by: UROLOGY

## 2021-12-28 PROCEDURE — 1160F RVW MEDS BY RX/DR IN RCRD: CPT | Performed by: UROLOGY

## 2021-12-28 NOTE — TELEPHONE ENCOUNTER
Patient managed by Milly Goldmann called to cancel MRI of Prostate  Connected to central scheduling  Stated he will reschedule at a later time   yamili

## 2022-02-04 ENCOUNTER — TELEPHONE (OUTPATIENT)
Dept: GASTROENTEROLOGY | Facility: CLINIC | Age: 72
End: 2022-02-04

## 2022-02-04 DIAGNOSIS — K85.00 IDIOPATHIC ACUTE PANCREATITIS WITHOUT INFECTION OR NECROSIS: Primary | ICD-10-CM

## 2022-02-04 NOTE — TELEPHONE ENCOUNTER
Patients GI provider:  MARLEN Masterson    Number to return call: (  [phone]    Reason for call: Pt has MRI abdomen scheduled 2-7-22 and he will need bun/ creatnine done prior, per [facility] in [address]    Scheduled procedure/appointment date if applicable: Appt 1-1-99

## 2022-02-05 ENCOUNTER — APPOINTMENT (OUTPATIENT)
Dept: LAB | Facility: MEDICAL CENTER | Age: 72
End: 2022-02-05
Payer: COMMERCIAL

## 2022-02-05 DIAGNOSIS — K85.00 IDIOPATHIC ACUTE PANCREATITIS WITHOUT INFECTION OR NECROSIS: ICD-10-CM

## 2022-02-05 LAB
ANION GAP SERPL CALCULATED.3IONS-SCNC: 5 MMOL/L (ref 4–13)
BUN SERPL-MCNC: 13 MG/DL (ref 5–25)
CALCIUM SERPL-MCNC: 9.6 MG/DL (ref 8.3–10.1)
CHLORIDE SERPL-SCNC: 107 MMOL/L (ref 100–108)
CO2 SERPL-SCNC: 27 MMOL/L (ref 21–32)
CREAT SERPL-MCNC: 1.02 MG/DL (ref 0.6–1.3)
GFR SERPL CREATININE-BSD FRML MDRD: 72 ML/MIN/1.73SQ M
GLUCOSE SERPL-MCNC: 87 MG/DL (ref 65–140)
POTASSIUM SERPL-SCNC: 4.4 MMOL/L (ref 3.5–5.3)
SODIUM SERPL-SCNC: 139 MMOL/L (ref 136–145)

## 2022-02-05 PROCEDURE — 80048 BASIC METABOLIC PNL TOTAL CA: CPT

## 2022-02-05 PROCEDURE — 36415 COLL VENOUS BLD VENIPUNCTURE: CPT

## 2022-02-07 ENCOUNTER — HOSPITAL ENCOUNTER (OUTPATIENT)
Dept: MRI IMAGING | Facility: HOSPITAL | Age: 72
Discharge: HOME | End: 2022-02-07
Payer: COMMERCIAL

## 2022-02-07 DIAGNOSIS — K85.00 IDIOPATHIC ACUTE PANCREATITIS WITHOUT INFECTION OR NECROSIS: ICD-10-CM

## 2022-02-07 PROCEDURE — A9585 GADOBUTROL INJECTION: HCPCS | Performed by: PHYSICIAN ASSISTANT

## 2022-02-07 PROCEDURE — G1004 CDSM NDSC: HCPCS

## 2022-02-07 PROCEDURE — 74183 MRI ABD W/O CNTR FLWD CNTR: CPT

## 2022-02-07 RX ADMIN — GADOBUTROL 6 ML: 604.72 INJECTION INTRAVENOUS at 14:31

## 2022-02-14 NOTE — RESULT ENCOUNTER NOTE
MRI showed a small cyst in the pancreas  Recommend repeat imaging in 2 yrs     [facility]

## 2022-02-22 ENCOUNTER — TELEPHONE (OUTPATIENT)
Dept: FAMILY MEDICINE CLINIC | Facility: CLINIC | Age: 72
End: 2022-02-22

## 2022-02-22 ENCOUNTER — OFFICE VISIT (OUTPATIENT)
Dept: FAMILY MEDICINE CLINIC | Facility: CLINIC | Age: 72
End: 2022-02-22
Payer: COMMERCIAL

## 2022-02-22 VITALS
OXYGEN SATURATION: 97 % | BODY MASS INDEX: 23.72 KG/M2 | HEART RATE: 76 BPM | DIASTOLIC BLOOD PRESSURE: 84 MMHG | WEIGHT: 147.6 LBS | HEIGHT: 66 IN | SYSTOLIC BLOOD PRESSURE: 150 MMHG | RESPIRATION RATE: 20 BRPM | TEMPERATURE: 98 F

## 2022-02-22 DIAGNOSIS — R03.0 ELEVATED BP WITHOUT DIAGNOSIS OF HYPERTENSION: ICD-10-CM

## 2022-02-22 DIAGNOSIS — R97.20 ELEVATED PROSTATE SPECIFIC ANTIGEN (PSA): ICD-10-CM

## 2022-02-22 DIAGNOSIS — N40.1 BPH WITH URINARY OBSTRUCTION: ICD-10-CM

## 2022-02-22 DIAGNOSIS — N13.8 BPH WITH URINARY OBSTRUCTION: ICD-10-CM

## 2022-02-22 DIAGNOSIS — I60.9 SUBARACHNOID HEMORRHAGE (HCC): ICD-10-CM

## 2022-02-22 DIAGNOSIS — Z00.00 MEDICARE ANNUAL WELLNESS VISIT, SUBSEQUENT: Primary | ICD-10-CM

## 2022-02-22 DIAGNOSIS — F41.1 GENERALIZED ANXIETY DISORDER: ICD-10-CM

## 2022-02-22 DIAGNOSIS — M35.3 PMR (POLYMYALGIA RHEUMATICA) (HCC): ICD-10-CM

## 2022-02-22 DIAGNOSIS — Z87.19 HISTORY OF PANCREATITIS: ICD-10-CM

## 2022-02-22 DIAGNOSIS — K86.2 PANCREATIC CYST: ICD-10-CM

## 2022-02-22 PROCEDURE — 3288F FALL RISK ASSESSMENT DOCD: CPT | Performed by: FAMILY MEDICINE

## 2022-02-22 PROCEDURE — 3008F BODY MASS INDEX DOCD: CPT | Performed by: FAMILY MEDICINE

## 2022-02-22 PROCEDURE — 1170F FXNL STATUS ASSESSED: CPT | Performed by: FAMILY MEDICINE

## 2022-02-22 PROCEDURE — 3725F SCREEN DEPRESSION PERFORMED: CPT | Performed by: FAMILY MEDICINE

## 2022-02-22 PROCEDURE — 1036F TOBACCO NON-USER: CPT | Performed by: FAMILY MEDICINE

## 2022-02-22 PROCEDURE — 1160F RVW MEDS BY RX/DR IN RCRD: CPT | Performed by: FAMILY MEDICINE

## 2022-02-22 PROCEDURE — G0439 PPPS, SUBSEQ VISIT: HCPCS | Performed by: FAMILY MEDICINE

## 2022-02-22 PROCEDURE — 99214 OFFICE O/P EST MOD 30 MIN: CPT | Performed by: FAMILY MEDICINE

## 2022-02-22 PROCEDURE — 1125F AMNT PAIN NOTED PAIN PRSNT: CPT | Performed by: FAMILY MEDICINE

## 2022-02-22 NOTE — PROGRESS NOTES
FAMILY PRACTICE OFFICE VISIT    NAME: Jim Le    AGE: 66 y o  SEX: male  : 1950   MRN: [id number]    DATE: 2022  TIME: 1:32 PM    Assessment and Plan   1  Medicare annual wellness visit, subsequent  Anticipatory guidance and preventative medicine discussed  Advise colonoscopy  Declines covid and flu shots  Discussed that if pt gets covid he could be at risk of severe complications from covid - he still declines  Had pneumonia and shingles vaccines  Declines adacel  appt in April for eye exam  Pt thinks that he is getting a cataract    Denies h/o tob use  2  Subarachnoid hemorrhage ([address] )  S/p ski accident   3  BPH with urinary obstruction  Pt seeing uro      4  Elevated prostate specific antigen (PSA)  No bx done    5  Generalized anxiety disorder  No problems    6  History of pancreatitis  No flares since hospitalization      7  Pancreatic cyst  F/u as per GI - pt states they wanted repeat imaging in 2 years    8  PMR  Sees rheumatology  And is on alternating prednisone 5mg with 10 mg dosing  Had a recent dexa thru rheumatology - and not started on meds  Recommend continue calcium and vit D  Our office to request cc of dexa thru rheumatology    9  Elevated BP without dx of htn  Keep active      Schedule colonoscopy    Cut back on caffeine - do 1/2 of what you have been doing  Cut back on salt - do not add any to foods  Watch intake of highly salty foods  Return in 3-4 mos for blood pressure check    Can order repeat fasting labs in 3 mos  Chief Complaint     Chief Complaint   Patient presents with    Medicare Wellness Visit     discuss GI results        History of Present Illness   Jim Le is a 66y o -year-old male who presents today for annual medicare wellness visit      Review of Systems   Review of Systems   Constitutional:        Exercises 3x/week  skiis 2x/week and then does the rowing machine    Bikes in the summer     HENT: Positive for voice change  Chronic voice change following surgery and trach years ago  Eyes:        Wears glasses   Goes to eye dr     Respiratory: Negative for cough, shortness of breath and wheezing  Cardiovascular: Negative for chest pain, palpitations and leg swelling  Gastrointestinal: Negative for abdominal pain  No changes in bowels  No GERD  Not taking PPI  No changes in bowels  Genitourinary:        Nocturia zero   Musculoskeletal:        Joints feel good  PMR controlled with prednisone     Neurological: Negative for dizziness, syncope and headaches  Hematological: Does not bruise/bleed easily  Psychiatric/Behavioral: Negative for dysphoric mood, self-injury, sleep disturbance and suicidal ideas  The patient is not nervous/anxious          Active Problem List     Patient Active Problem List   Diagnosis    Arthralgia of multiple joints    BPH with urinary obstruction    DDD (degenerative disc disease), cervical    Elevated prostate specific antigen (PSA)    Mixed hyperlipidemia    Nocturia    Subarachnoid hemorrhage (HCC)    Vocal cord dysfunction    [facility]    Bunion    Chondromalacia patellae    Abnormal C-reactive protein    Crystal arthropathy of ankle and foot    ESR raised    Hip pain    Osteoarthritis of knee    Shoulder joint pain    Disability of walking    Kidney stone    Vocal cord paralysis    Generalized anxiety disorder    Idiopathic acute pancreatitis without infection or necrosis    Mild protein-calorie malnutrition ([address] )    Acute kidney injury ([address] )         Past Medical History:  Past Medical History:   Diagnosis Date    Abdominal pain     Abscess of right thigh     Last Assessed: 7/29/2016    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    BPH with obstruction/lower urinary tract symptoms     Closed compression fracture of sacrum (HCC)     Contusion of lung     Current chronic use of steroids     off 9/14    Elevated PSA     Incomplete bladder emptying     Inguinal hernia     Last Assessed: 11/7/2014    Kidney stone     Nephrolithiasis     Nocturia     Pneumothorax, right     Last Assessed: 12/30/2016    [facility] rhe[facility]a [facility] [address] ) 2013    Rheumatic fever     on Pcn x yrs    Rib fracture     Right flank pain     Subdural hematoma ([facility] [facility] ) 02/2010    Vocal cord paralysis     Weak urinary stream        Past Surgical History:  Past Surgical History:   Procedure Laterality Date    COLONOSCOPY  2010    Normal   Biopsy showed collagenous colitis by Dr Boyd    ENDOTRACHEAL INTUBATION EMERGENT      FLEXIBLE SIGMOIDOSCOPY  2012    Normal biopsy showed normal tissue by Dr Quarles An      throat Larynx Vocal Cord Mobility - twice    TRACHEOSTOMY      Emergency       Family History:  Family History   Problem Relation Age of Onset    Diabetes Mother     Lung cancer Mother     Nephrolithiasis Father     Stomach cancer Maternal Grandmother     Cancer Family     Diabetes Family     Urolithiasis Family        Social History:  Social History     Socioeconomic History    Marital status:      Spouse name: Not on file    Number of children: Not on file    Years of education: Not on file    Highest education level: Not on file   Occupational History    Not on file   Tobacco Use    Smoking status: Never Smoker    Smokeless tobacco: Never Used   Vaping Use    Vaping Use: Never used   Substance and Sexual Activity    Alcohol use: Yes     Comment: Drinks socially; "a few beers a week"      Drug use: No    Sexual activity: Not on file   Other Topics Concern    Not on file   Social History Narrative    Caffeine use     per [employer]    Exercises regularly     Social Determinants of Health     Financial Resource Strain: Not on file   Food Insecurity: Not on file   Transportation Needs: Not on file   Physical Activity: Not on file   Stress: Not on file   Social Connections: Not on file   Intimate Partner Violence: Not on file   Housing Stability: Not on file       Objective     Vitals:    02/22/22 1320   BP: 166/94   Pulse: 76   Resp: 20   Temp: 98 °F (36 7 °C)   SpO2: 97%     Wt Readings from Last 3 Encounters:   02/22/22 67 kg (147 lb 9 6 oz)   12/23/21 64 kg (141 lb)   11/22/21 64 kg (141 lb)       Physical Exam  Vitals and nursing note reviewed  Constitutional:       General: He is not in acute distress  Appearance: Normal appearance  He is normal weight  He is not ill-appearing or toxic-appearing  HENT:      Right Ear: Tympanic membrane and external ear normal       Left Ear: Tympanic membrane and external ear normal       Nose: Nose normal  No congestion or rhinorrhea  Mouth/Throat:      Mouth: Mucous membranes are moist       Pharynx: Oropharynx is clear  No oropharyngeal exudate or posterior oropharyngeal erythema  Comments: Mucous membranes moist and pink; no oropharyngeal exudates  Eyes:      General: No scleral icterus  Extraocular Movements: Extraocular movements intact  Conjunctiva/sclera: Conjunctivae normal       Pupils: Pupils are equal, round, and reactive to light  Neck:      Vascular: No carotid bruit  Cardiovascular:      Rate and Rhythm: Normal rate and regular rhythm  Pulses: Normal pulses  Heart sounds: Normal heart sounds  No murmur heard  Pulmonary:      Effort: Pulmonary effort is normal  No respiratory distress  Breath sounds: Normal breath sounds  No stridor  No wheezing, rhonchi or rales  Abdominal:      General: Abdomen is flat  There is no distension  Palpations: There is no mass  Tenderness: There is no abdominal tenderness  There is no guarding or rebound  Hernia: No hernia is present  Musculoskeletal:         General: No swelling or tenderness  Normal range of motion  Cervical back: Normal range of motion and neck supple        Right lower leg: No edema  Left lower leg: No edema  Lymphadenopathy:      Cervical: No cervical adenopathy  Skin:     General: Skin is warm  Capillary Refill: Capillary refill takes less than 2 seconds  Coloration: Skin is not jaundiced  Findings: No rash  Neurological:      General: No focal deficit present  Mental Status: He is alert and oriented to person, place, and time  Cranial Nerves: No cranial nerve deficit  Sensory: No sensory deficit  Motor: No weakness  Coordination: Coordination normal       Gait: Gait normal       Deep Tendon Reflexes: Reflexes normal    Psychiatric:         Mood and Affect: Mood normal          Behavior: Behavior normal          Thought Content:  Thought content normal          Judgment: Judgment normal          Pertinent Laboratory/Diagnostic Studies:  Lab Results   Component Value Date    GLUCOSE 114 10/13/2014    BUN 13 02/05/2022    CREATININE 1 02 02/05/2022    CALCIUM 9 6 02/05/2022     10/13/2014    K 4 4 02/05/2022    CO2 27 02/05/2022     02/05/2022     Lab Results   Component Value Date    ALT 19 11/04/2021    AST 13 11/04/2021    ALKPHOS 46 11/04/2021    BILITOT 0 5 10/13/2014       Lab Results   Component Value Date    WBC 12 88 (H) 11/05/2021    HGB 12 3 11/05/2021    HCT 37 1 11/05/2021    MCV 90 11/05/2021     11/05/2021       No results found for: TSH    No results found for: CHOL  Lab Results   Component Value Date    TRIG 48 11/04/2021    TRIG 48 11/04/2021     Lab Results   Component Value Date    HDL 52 11/04/2021    HDL 52 11/04/2021     Lab Results   Component Value Date    LDLCALC 130 (H) 11/04/2021    LDLCALC 130 (H) 11/04/2021     No results found for: HGBA1C    Results for orders placed or performed in visit on 80/34/46   Basic metabolic panel   Result Value Ref Range    Sodium 139 136 - 145 mmol/L    Potassium 4 4 3 5 - 5 3 mmol/L    Chloride 107 100 - 108 mmol/L    CO2 27 21 - 32 mmol/L    ANION GAP 5 4 - 13 mmol/L    BUN 13 5 - 25 mg/dL    Creatinine 1 02 0 60 - 1 30 mg/dL    Glucose 87 65 - 140 mg/dL    Calcium 9 6 8 3 - 10 1 mg/dL    eGFR 72 ml/min/1 73sq m       No orders of the defined types were placed in this encounter  ALLERGIES:  Allergies   Allergen Reactions    Influenza Vaccines Dizziness    Zoloft [Sertraline]        Current Medications     Current Outpatient Medications   Medication Sig Dispense Refill    Ascorbic Acid (VITAMIN C) 500 MG CAPS Take 1 tablet by mouth daily      Cholecalciferol (VITAMIN D-3) 1000 units CAPS Take by mouth daily      predniSONE 5 mg tablet 2 5 mg daily       vitamin E, tocopherol, (vitamin E) 400 units capsule Take by mouth daily      omeprazole (PriLOSEC) 40 MG capsule TAKE 1 CAPSULE BY MOUTH EVERY DAY (Patient not taking: Reported on 12/28/2021) 30 capsule 0     No current facility-administered medications for this visit           Health Maintenance     Health Maintenance   Topic Date Due    DXA SCAN  Never done    DTaP,Tdap,and Td Vaccines (1 - Tdap) Never done   [facility] Annual Wellness Visit (AWV)  09/20/2019    Colorectal Cancer Screening  12/15/2020    COVID-19 Vaccine (1) [birth year]/22/2022 (Originally 9/10/1955)    Influenza Vaccine (1) 06/30/2022 (Originally 9/1/2021)    Fall Risk  11/18/2022    BMI: Adult  12/23/2022    Depression Screening  02/22/2023    Hepatitis C Screening  Completed    Pneumococcal Vaccine: 65+ Years  Completed    HIB Vaccine  Aged Out    Hepatitis B Vaccine  Aged Out    IPV Vaccine  Aged Out    Hepatitis A Vaccine  Aged Out    Meningococcal ACWY Vaccine  Aged Out    HPV Vaccine  Aged Out     Immunization History   Administered Date(s) Administered    INFLUENZA 09/20/2018    Influenza, high dose seasonal 0 7 mL 09/20/2018, 10/07/2019    Pneumococcal Conjugate 13-Valent 01/16/2017    Pneumococcal Polysaccharide PPV23 09/20/2018    Zoster 06/09/2005, 06/09/2005, 06/09/2005       Depression Screening and Follow-up Plan: Patient was screened for depression during today's encounter  They screened negative with a PHQ-2 score of 0          Brittany Reardon DO

## 2022-02-22 NOTE — TELEPHONE ENCOUNTER
Please call over to [facility] - dr Josselin Palacio (spemarcie?) - to ask for cc of recent dexa done within the next year  thanks

## 2022-02-22 NOTE — PROGRESS NOTES
Assessment and Plan:     Problem List Items Addressed This Visit        Nervous and Auditory    Subarachnoid hemorrhage (HCC)       Genitourinary    BPH with urinary obstruction       Other    Elevated prostate specific antigen (PSA)    PMR (polymyalgia rheumatica) (HCC)    Generalized anxiety disorder      Other Visit Diagnoses     Medicare annual wellness visit, subsequent    -  Primary    History of pancreatitis        Pancreatic cyst               Preventive health issues were discussed with patient, and age appropriate screening tests were ordered as noted in patient's After Visit Summary  Personalized health advice and appropriate referrals for health education or preventive services given if needed, as noted in patient's After Visit Summary       History of Present Illness:     Patient presents for Medicare Annual Wellness visit    Patient Care Team:  Kavita Ho DO as PCP - General (Family Medicine)  Jamel Paez MD     Problem List:     Patient Active Problem List   Diagnosis    Arthralgia of multiple joints    BPH with urinary obstruction    DDD (degenerative disc disease), cervical    Elevated prostate specific antigen (PSA)    Mixed hyperlipidemia    Nocturia    Subarachnoid hemorrhage ([address] )    Vocal cord dysfunction    PMR (polymyalgia rheumatica) (HCC)    Bunion    Chondromalacia patellae    Abnormal C-reactive protein    Crystal arthropathy of ankle and foot    ESR raised    Hip pain    Osteoarthritis of knee    Shoulder joint pain    Disability of walking    Kidney stone    Vocal cord paralysis    Generalized anxiety disorder    Idiopathic acute pancreatitis without infection or necrosis    Mild protein-calorie malnutrition ([address] )    Acute kidney injury [facility])      Past Medical and Surgical History:     Past Medical History:   Diagnosis Date    Abdominal pain     Abscess of right thigh     Last Assessed: 7/29/2016    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    BPH with obstruction/lower urinary tract symptoms     Closed compression fracture of sacrum (HCC)     Contusion of lung     Current chronic use of steroids     off 9/14    Elevated PSA     Incomplete bladder emptying     Inguinal hernia     Last Assessed: 11/7/2014    Kidney stone     Nephrolithiasis     Nocturia     Pneumothorax, right     Last Assessed: 12/30/2016    Polymyalgia rheumatica ([facility] [address] ) 2013    Rheumatic fever     on Pcn x yrs    Rib fracture     Right flank pain     Subdural hematoma ([facility] [address] ) 02/2010    Vocal cord paralysis     Weak urinary stream      Past Surgical History:   Procedure Laterality Date    COLONOSCOPY  2010    Normal   Biopsy showed collagenous colitis by Dr Boyd    ENDOTRACHEAL INTUBATION EMERGENT      FLEXIBLE SIGMOIDOSCOPY  2012    Normal biopsy showed normal tissue by Dr Satya Mars      throat Larynx Vocal Cord Mobility - twice    TRACHEOSTOMY      Emergency      Family History:     Family History   Problem Relation Age of Onset    Diabetes Mother     Lung cancer Mother     Nephrolithiasis Father     Stomach cancer Maternal Grandmother     Cancer Family     Diabetes Family     Urolithiasis Family       Social History:     Social History     Socioeconomic History    Marital status:      Spouse name: None    Number of children: None    Years of education: None    Highest education level: None   Occupational History    None   Tobacco Use    Smoking status: Never Smoker    Smokeless tobacco: Never Used   Vaping Use    Vaping Use: Never used   Substance and Sexual Activity    Alcohol use: Yes     Comment: Drinks socially; "a few beers a week"      Drug use: No    Sexual activity: None   Other Topics Concern    None   Social History Narrative    Caffeine use     per [employer]    Exercises regularly     Social Determinants of Health     Financial Resource Strain: Not on file   Food Insecurity: Not on file   Transportation Needs: Not on file   Physical Activity: Not on file   Stress: Not on file   Social Connections: Not on file   Intimate Partner Violence: Not on file   Housing Stability: Not on file      Medications and Allergies:     Current Outpatient Medications   Medication Sig Dispense Refill    Ascorbic Acid (VITAMIN C) 500 MG CAPS Take 1 tablet by mouth daily      Cholecalciferol (VITAMIN D-3) 1000 units CAPS Take by mouth daily      predniSONE 5 mg tablet 2 5 mg daily       vitamin E, tocopherol, (vitamin E) 400 units capsule Take by mouth daily       No current facility-administered medications for this visit  Allergies   Allergen Reactions    Influenza Vaccines Dizziness    Zoloft [Sertraline]       Immunizations:     Immunization History   Administered Date(s) Administered    INFLUENZA 09/20/2018    Influenza, high dose seasonal 0 7 mL 09/20/2018, 10/07/2019    Pneumococcal Conjugate 13-Valent 01/16/2017    Pneumococcal Polysaccharide PPV23 09/20/2018    Zoster 06/09/2005, 06/09/2005, 06/09/2005      Health Maintenance:         Topic Date Due    DXA SCAN  Never done    Colorectal Cancer Screening  12/15/2020    Hepatitis C Screening  Completed     There are no preventive care reminders to display for this patient  Medicare Health Risk Assessment:     /94 (BP Location: Left arm, Patient Position: Sitting, Cuff Size: Standard)   Pulse 76   Temp 98 °F (36 7 °C) (Temporal)   Resp 20   Ht 5' 6" (1 676 m)   Wt 67 kg (147 lb 9 6 oz)   SpO2 97%   BMI 23 82 kg/m²      Tom Vasques is here for his Subsequent Wellness visit  Health Risk Assessment:   Patient rates overall health as very good  Patient feels that their physical health rating is Same  Patient is satisfied with their life  Eyesight was rated as Same  Hearing was rated as same  Patient feels that their emotional and mental health rating is Same   Patients states they are sometimes angry  Patient states they are never, rarely unusually tired/fatigued  Pain experienced in the last 7 days has been none  Patient states that he has experienced no weight loss or gain in last 6 months  Depression Screening:   PHQ-2 Score: 0      Fall Risk Screening: In the past year, patient has experienced: no history of falling in past year      Home Safety:  Patient has trouble with stairs inside or outside of their home  Patient has working smoke alarms and has working carbon monoxide detector  Home safety hazards include: none  Nutrition:   Current diet is Regular  Medications:   Patient is currently taking over-the-counter supplements  OTC medications include: see medication list  Patient is able to manage medications  Activities of Daily Living (ADLs)/Instrumental Activities of Daily Living (IADLs):   Walk and transfer into and out of bed and chair?: Yes  Dress and groom yourself?: Yes    Bathe or shower yourself?: Yes    Feed yourself?  Yes  Do your laundry/housekeeping?: Yes  Manage your money, pay your bills and track your expenses?: Yes  Make your own meals?: Yes    Do your own shopping?: Yes    Previous Hospitalizations:   Any hospitalizations or ED visits within the last 12 months?: Yes    How many hospitalizations have you had in the last year?: 1-2    Advance Care Planning:   Living will: Yes    Advanced directive: Yes    Advanced directive counseling given: No      PREVENTIVE SCREENINGS      Cardiovascular Screening:    General: Screening Not Indicated and History Lipid Disorder      Diabetes Screening:     General: Screening Current      Colorectal Cancer Screening:       Due for: Colonoscopy - Low Risk      Prostate Cancer Screening:    General: Screening Current      Osteoporosis Screening:    General: Screening Not Indicated      Abdominal Aortic Aneurysm (AAA) Screening:    Risk factors include: age between 73-67 yo        General: Screening Current      Lung Cancer Screening: General: Screening Not Indicated      Hepatitis C Screening:    General: Screening Current      Brittany Reardon DO

## 2022-02-22 NOTE — PATIENT INSTRUCTIONS
Schedule colonoscopy    Cut back on caffeine - do 1/2 of what you have been doing  Cut back on salt - do not add any to foods  Watch intake of highly salty foods  Return in 3-4 mos for blood pressure check    Medicare Preventive Visit Patient Instructions  Thank you for completing your Welcome to Medicare Visit or Medicare Annual Wellness Visit today  Your next wellness visit will be due in one year (2/23/2023)  The screening/preventive services that you may require over the next 5-10 years are detailed below  Some tests may not apply to you based off risk factors and/or age  Screening tests ordered at today's visit but not completed yet may show as past due  Also, please note that scanned in results may not display below  Preventive Screenings:  Service Recommendations Previous Testing/Comments   Colorectal Cancer Screening  · Colonoscopy    · Fecal Occult Blood Test (FOBT)/Fecal Immunochemical Test (FIT)  · Fecal DNA/Cologuard Test  · Flexible Sigmoidoscopy Age: 54-65 years old   Colonoscopy: every 10 years (May be performed more frequently if at higher risk)  OR  FOBT/FIT: every 1 year  OR  Cologuard: every 3 years  OR  Sigmoidoscopy: every 5 years  Screening may be recommended earlier than age 48 if at higher risk for colorectal cancer  Also, an individualized decision between you and your healthcare provider will decide whether screening between the ages of 74-80 would be appropriate   Colonoscopy: 12/15/2010  FOBT/FIT: Not on file  Cologuard: Not on file  Sigmoidoscopy: Not on file    Due for Colonoscopy - Low Risk     Prostate Cancer Screening Individualized decision between patient and health care provider in men between ages of 53-78   Medicare will cover every 12 months beginning on the day after your 50th birthday PSA: 6 3 ng/mL     Screening Current     Hepatitis C Screening Once for adults born between [facility]  More frequently in patients at high risk for Hepatitis C Hep C Antibody: 08/16/2018    Screening Current   Diabetes Screening 1-2 times per year if you're at risk for diabetes or have pre-diabetes Fasting glucose: 139 mg/dL   A1C: No results in last 5 years    Screening Current   Cholesterol Screening Once every 5 years if you don't have a lipid disorder  May order more often based on risk factors  Lipid panel: 11/04/2021    Screening Not Indicated  History Lipid Disorder      Other Preventive Screenings Covered by Medicare:  1  Abdominal Aortic Aneurysm (AAA) Screening: covered once if your at risk  You're considered to be at risk if you have a family history of AAA or a male between the age of 73-68 who smoking at least 100 cigarettes in your lifetime  2  Lung Cancer Screening: covers low dose CT scan once per year if you meet all of the following conditions: (1) Age 50-69; (2) No signs or symptoms of lung cancer; (3) Current smoker or have quit smoking within the last 15 years; (4) You have a tobacco smoking history of at least 30 pack years (packs per day x number of years you smoked); (5) You get a written order from a healthcare provider  3  Glaucoma Screening: covered annually if you're considered high risk: (1) You have diabetes OR (2) Family history of glaucoma OR (3)  aged 48 and older OR (3)  American aged 72 and older  3  Osteoporosis Screening: covered every 2 years if you meet one of the following conditions: (1) Have a vertebral abnormality; (2) On glucocorticoid therapy for more than 3 months; (3) Have primary hyperparathyroidism; (4) On osteoporosis medications and need to assess response to drug therapy  5  HIV Screening: covered annually if you're between the age of 12-76  Also covered annually if you are younger than 13 and older than 72 with risk factors for HIV infection  For pregnant patients, it is covered up to 3 times per pregnancy      Immunizations:  Immunization Recommendations   Influenza Vaccine Annual influenza vaccination during flu season is recommended for all persons aged >= 6 months who do not have contraindications   Pneumococcal Vaccine (Prevnar and Pneumovax)  * Prevnar = PCV13  * Pneumovax = PPSV23 Adults 25-60 years old: 1-3 doses may be recommended based on certain risk factors  Adults 72 years old: Prevnar (PCV13) vaccine recommended followed by Pneumovax (PPSV23) vaccine  If already received PPSV23 since turning 65, then PCV13 recommended at least one year after PPSV23 dose  Hepatitis B Vaccine 3 dose series if at intermediate or high risk (ex: diabetes, end stage renal disease, liver disease)   Tetanus (Td) Vaccine - COST NOT COVERED BY MEDICARE PART B Following completion of primary series, a booster dose should be given every 10 years to maintain immunity against tetanus  Td may also be given as tetanus wound prophylaxis  Tdap Vaccine - COST NOT COVERED BY MEDICARE PART B Recommended at least once for all adults  For pregnant patients, recommended with each pregnancy  Shingles Vaccine (Shingrix) - COST NOT COVERED BY MEDICARE PART B  2 shot series recommended in those aged 48 and above     Health Maintenance Due:      Topic Date Due    DXA SCAN  Never done    Colorectal Cancer Screening  12/15/2020    Hepatitis C Screening  Completed     Immunizations Due:  There are no preventive care reminders to display for this patient  Advance Directives   What are advance directives? Advance directives are legal documents that state your wishes and plans for medical care  These plans are made ahead of time in case you lose your ability to make decisions for yourself  Advance directives can apply to any medical decision, such as the treatments you want, and if you want to donate organs  What are the types of advance directives? There are many types of advance directives, and each state has rules about how to use them  You may choose a combination of any of the following:  · Living will:   This is a written record of the treatment you want  You can also choose which treatments you do not want, which to limit, and which to stop at a certain time  This includes surgery, medicine, IV fluid, and tube feedings  · Durable power of  for healthcare [facility]): This is a written record that states who you want to make healthcare choices for you when you are unable to make them for yourself  This person, called a proxy, is usually a family member or a friend  You may choose more than 1 proxy  · Do not resuscitate (DNR) order:  A DNR order is used in case your heart stops beating or you stop breathing  It is a request not to have certain forms of treatment, such as CPR  A DNR order may be included in other types of advance directives  · Medical directive: This covers the care that you want if you are in a coma, near death, or unable to make decisions for yourself  You can list the treatments you want for each condition  Treatment may include pain medicine, surgery, blood transfusions, dialysis, IV or tube feedings, and a ventilator (breathing machine)  · Values history: This document has questions about your views, beliefs, and how you feel and think about life  This information can help others choose the care that you would choose  Why are advance directives important? An advance directive helps you control your care  Although spoken wishes may be used, it is better to have your wishes written down  Spoken wishes can be misunderstood, or not followed  Treatments may be given even if you do not want them  An advance directive may make it easier for your family to make difficult choices about your care  © Copyright [employer] 2018 Information is for End User's use only and may not be sold, redistributed or otherwise used for commercial purposes   All illustrations and images included in CareNotes® are the copyrighted property of [employer]  or [employer]

## 2022-03-08 ENCOUNTER — TELEPHONE (OUTPATIENT)
Dept: FAMILY MEDICINE CLINIC | Facility: CLINIC | Age: 72
End: 2022-03-08

## 2022-03-08 NOTE — TELEPHONE ENCOUNTER
I personally called pt at [phone] - in followup to recently reviewed dexa scan  Ordered by dr Dominic Boykin - it was done 6/2021 - but pt had a cc sent for my review  dexa is Consistent with osteopenia  As pt is taking prednisone daily for PMR  He has a f/u appt with ordering dr in April and will discuss repeat testing - most likely by end of 2022

## 2022-05-04 ENCOUNTER — RA CDI HCC (OUTPATIENT)
Dept: OTHER | Facility: HOSPITAL | Age: 72
End: 2022-05-04

## 2022-05-04 NOTE — PROGRESS NOTES
[facility]  coding opportunities       Chart reviewed, no opportunity found:   [address]        Patients Insurance     Medicare Insurance: [employer]

## 2022-05-24 ENCOUNTER — OFFICE VISIT (OUTPATIENT)
Dept: FAMILY MEDICINE CLINIC | Facility: CLINIC | Age: 72
End: 2022-05-24
Payer: COMMERCIAL

## 2022-05-24 ENCOUNTER — LAB (OUTPATIENT)
Dept: LAB | Facility: MEDICAL CENTER | Age: 72
End: 2022-05-24
Payer: COMMERCIAL

## 2022-05-24 VITALS
WEIGHT: 144.6 LBS | RESPIRATION RATE: 12 BRPM | SYSTOLIC BLOOD PRESSURE: 130 MMHG | OXYGEN SATURATION: 98 % | HEIGHT: 66 IN | BODY MASS INDEX: 23.24 KG/M2 | HEART RATE: 74 BPM | DIASTOLIC BLOOD PRESSURE: 88 MMHG

## 2022-05-24 DIAGNOSIS — D72.821 MONOCYTOSIS: ICD-10-CM

## 2022-05-24 DIAGNOSIS — E83.51 HYPOCALCEMIA: ICD-10-CM

## 2022-05-24 DIAGNOSIS — M35.3 PMR (POLYMYALGIA RHEUMATICA) (HCC): ICD-10-CM

## 2022-05-24 DIAGNOSIS — E44.1 MILD PROTEIN-CALORIE MALNUTRITION (HCC): ICD-10-CM

## 2022-05-24 DIAGNOSIS — R13.10 DYSPHAGIA, UNSPECIFIED TYPE: ICD-10-CM

## 2022-05-24 DIAGNOSIS — R03.0 ELEVATED BLOOD PRESSURE READING IN OFFICE WITHOUT DIAGNOSIS OF HYPERTENSION: Primary | ICD-10-CM

## 2022-05-24 LAB
ALBUMIN SERPL BCP-MCNC: 4 G/DL (ref 3.5–5)
ALP SERPL-CCNC: 60 U/L (ref 46–116)
ALT SERPL W P-5'-P-CCNC: 26 U/L (ref 12–78)
ANION GAP SERPL CALCULATED.3IONS-SCNC: 1 MMOL/L (ref 4–13)
AST SERPL W P-5'-P-CCNC: 21 U/L (ref 5–45)
BASOPHILS # BLD AUTO: 0.05 THOUSANDS/ΜL (ref 0–0.1)
BASOPHILS NFR BLD AUTO: 1 % (ref 0–1)
BILIRUB SERPL-MCNC: 0.61 MG/DL (ref 0.2–1)
BUN SERPL-MCNC: 18 MG/DL (ref 5–25)
CALCIUM SERPL-MCNC: 9.7 MG/DL (ref 8.3–10.1)
CHLORIDE SERPL-SCNC: 108 MMOL/L (ref 100–108)
CO2 SERPL-SCNC: 30 MMOL/L (ref 21–32)
CREAT SERPL-MCNC: 1.02 MG/DL (ref 0.6–1.3)
EOSINOPHIL # BLD AUTO: 0.19 THOUSAND/ΜL (ref 0–0.61)
EOSINOPHIL NFR BLD AUTO: 3 % (ref 0–6)
ERYTHROCYTE [DISTWIDTH] IN BLOOD BY AUTOMATED COUNT: 15.6 % (ref 11.6–15.1)
GFR SERPL CREATININE-BSD FRML MDRD: 72 ML/MIN/1.73SQ M
GLUCOSE P FAST SERPL-MCNC: 78 MG/DL (ref 65–99)
HCT VFR BLD AUTO: 45.6 % (ref 36.5–49.3)
HGB BLD-MCNC: 14.6 G/DL (ref 12–17)
IMM GRANULOCYTES # BLD AUTO: 0.04 THOUSAND/UL (ref 0–0.2)
IMM GRANULOCYTES NFR BLD AUTO: 1 % (ref 0–2)
LYMPHOCYTES # BLD AUTO: 1.62 THOUSANDS/ΜL (ref 0.6–4.47)
LYMPHOCYTES NFR BLD AUTO: 22 % (ref 14–44)
MCH RBC QN AUTO: 28.2 PG (ref 26.8–34.3)
MCHC RBC AUTO-ENTMCNC: 32 G/DL (ref 31.4–37.4)
MCV RBC AUTO: 88 FL (ref 82–98)
MONOCYTES # BLD AUTO: 1.02 THOUSAND/ΜL (ref 0.17–1.22)
MONOCYTES NFR BLD AUTO: 14 % (ref 4–12)
NEUTROPHILS # BLD AUTO: 4.38 THOUSANDS/ΜL (ref 1.85–7.62)
NEUTS SEG NFR BLD AUTO: 59 % (ref 43–75)
NRBC BLD AUTO-RTO: 0 /100 WBCS
PLATELET # BLD AUTO: 205 THOUSANDS/UL (ref 149–390)
PMV BLD AUTO: 11.8 FL (ref 8.9–12.7)
POTASSIUM SERPL-SCNC: 4.1 MMOL/L (ref 3.5–5.3)
PROT SERPL-MCNC: 7.2 G/DL (ref 6.4–8.2)
RBC # BLD AUTO: 5.17 MILLION/UL (ref 3.88–5.62)
SODIUM SERPL-SCNC: 139 MMOL/L (ref 136–145)
WBC # BLD AUTO: 7.3 THOUSAND/UL (ref 4.31–10.16)

## 2022-05-24 PROCEDURE — 36415 COLL VENOUS BLD VENIPUNCTURE: CPT

## 2022-05-24 PROCEDURE — 3008F BODY MASS INDEX DOCD: CPT | Performed by: FAMILY MEDICINE

## 2022-05-24 PROCEDURE — 99214 OFFICE O/P EST MOD 30 MIN: CPT | Performed by: FAMILY MEDICINE

## 2022-05-24 PROCEDURE — 80053 COMPREHEN METABOLIC PANEL: CPT

## 2022-05-24 PROCEDURE — 1036F TOBACCO NON-USER: CPT | Performed by: FAMILY MEDICINE

## 2022-05-24 PROCEDURE — 85025 COMPLETE CBC W/AUTO DIFF WBC: CPT

## 2022-05-24 PROCEDURE — 1160F RVW MEDS BY RX/DR IN RCRD: CPT | Performed by: FAMILY MEDICINE

## 2022-05-24 RX ORDER — FAMOTIDINE 40 MG/1
TABLET, FILM COATED ORAL
Qty: 90 TABLET | Refills: 0 | Status: SHIPPED | OUTPATIENT
Start: 2022-05-24

## 2022-05-24 NOTE — PATIENT INSTRUCTIONS
Nonfasting labs today  Patient to call for results if he/she does not hear from us    Start taking pepcid 40 mg at bedtime to protect your stomach    Refer to GI for egd and colonoscopy    Cut back on caffeine - try for 1/2 of usual amount

## 2022-05-24 NOTE — PROGRESS NOTES
FAMILY PRACTICE OFFICE VISIT    NAME: Rowan Gomez    AGE: 66 y o  SEX: male  : 1950   MRN: [id number]    DATE: 2022  TIME: 1:12 PM    Assessment and Plan   1  Elevated blood pressure reading in office without diagnosis of hypertension  BP lower today than prior visits  Pt is also just about tapered off prednisone thru ortho for PMR - over the next 30 days  (note - has had dexa scan)  Avoids nsaids  Again discussed dietary changes  And pt to remain active with regular exercise  Return for routine visit in 2022      2  PMR (polymyalgia rheumatica) (HCC)    Managed thru ortho    3  Leukocytosis - on 2021 labs   Repeat today  Pt without active s/s infection    4  Hypocalcemia  Repeat today    5  Dysphagia  Send to GI for egd and colonoscopy:    Chronic prednisone use; to consider BOTH egd and colonoscopy; occasional dysphagia; no odonophagia    Pt has been on and off prednisone X 10 years  Will also add pepcid 40 mg nightly  And to consider PPI s/p scope if indicated  Pt denies h/o GI bleed or ulcer  Pt without gerd symptoms    Patient Instructions   Nonfasting labs today  Patient to call for results if he/she does not hear from us    Start taking pepcid 40 mg at bedtime to protect your stomach    Refer to GI for egd and colonoscopy    Cut back on caffeine - try for 1/2 of usual amount  Chief Complaint   No chief complaint on file  History of Present Illness   Rowan Gomez is a 66y o -year-old male who presents today for shortterm f/u to elevated BP reading  Pt not checking at home  But has cut back on salt intake  Continues to exercise regularly      Review of Systems   Review of Systems   Constitutional: Negative for fever and unexpected weight change  No recent illness or fever     HENT:        Occasional trouble swallowing and needs to drink more water  Injury to vocal cord in past during intubation/extubation which was repeated X 4 X over 7 days  Respiratory: Negative for cough, shortness of breath and wheezing  Cardiovascular: Negative for chest pain and palpitations  Gastrointestinal:        No heartburn   Psychiatric/Behavioral: Negative for dysphoric mood, self-injury, sleep disturbance and suicidal ideas  The patient is not nervous/anxious          Active Problem List     Patient Active Problem List   Diagnosis    Arthralgia of multiple joints    BPH with urinary obstruction    DDD (degenerative disc disease), cervical    Elevated prostate specific antigen (PSA)    Mixed hyperlipidemia    Nocturia    Subarachnoid hemorrhage (HCC)    Vocal cord dysfunction    PMR [facility]algia [facility]a) ([facility])    Bunion    Chondromalacia patellae    Abnormal C-reactive protein    Crystal arthropathy of ankle and foot    ESR raised    Hip pain    Osteoarthritis of knee    Shoulder joint pain    Disability of walking    Kidney stone    Vocal cord paralysis    Generalized anxiety disorder    Idiopathic acute pancreatitis without infection or necrosis    Mild protein-calorie malnutrition ([address] )    Acute kidney injury (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 [address] ) 2013    Rheumatic fever     on Pcn x yrs    Rib fracture     Right flank pain     Subdural hematoma ([address] ) 02/2010    Vocal cord paralysis     Weak urinary stream        Past Surgical History:  Past Surgical History:   Procedure Laterality Date    COLONOSCOPY  2010    Normal   Biopsy showed collagenous colitis by Dr Boyd    ENDOTRACHEAL INTUBATION EMERGENT      FLEXIBLE SIGMOIDOSCOPY  2012    Normal biopsy showed normal tissue by Dr Ramsey Pia      throat Larynx Vocal Cord Mobility - twice    TRACHEOSTOMY      Emergency       Family History:  Family History   Problem Relation Age of Onset    Diabetes Mother     Lung cancer Mother     Nephrolithiasis Father     Stomach cancer Maternal Grandmother     Cancer Family     Diabetes Family     Urolithiasis Family        Social History:  Social History     Socioeconomic History    Marital status:      Spouse name: Not on file    Number of children: Not on file    Years of education: Not on file    Highest education level: Not on file   Occupational History    Not on file   Tobacco Use    Smoking status: Never Smoker    Smokeless tobacco: Never Used   Vaping Use    Vaping Use: Never used   Substance and Sexual Activity    Alcohol use: Yes     Comment: Drinks socially; "a few beers a week"   Drug use: No    Sexual activity: Not on file   Other Topics Concern    Not on file   Social History Narrative    Caffeine use     per [employer]    Exercises regularly     Social Determinants of Health     Financial Resource Strain: Not on file   Food Insecurity: Not on file   Transportation Needs: Not on file   Physical Activity: Not on file   Stress: Not on file   Social Connections: Not on file   Intimate Partner Violence: Not on file   Housing Stability: Not on file       Objective     Vitals:    05/24/22 1255   BP: 140/60   Pulse: 74   Resp: 12   SpO2: 98%     Wt Readings from Last 3 Encounters:   05/24/22 65 6 kg (144 lb 9 6 oz)   02/22/22 67 kg (147 lb 9 6 oz)   12/23/21 64 kg (141 lb)       Physical Exam  Vitals and nursing note reviewed     Constitutional:       General: He is not in acute distress  Appearance: Normal appearance  He is not ill-appearing or toxic-appearing  Cardiovascular:      Rate and Rhythm: Normal rate and regular rhythm  Pulses: Normal pulses  Heart sounds: Normal heart sounds  No murmur heard  Pulmonary:      Effort: Pulmonary effort is normal  No respiratory distress  Breath sounds: Normal breath sounds  No wheezing, rhonchi or rales  Musculoskeletal:      Right lower leg: No edema  Left lower leg: No edema  Neurological:      General: No focal deficit present  Mental Status: He is alert and oriented to person, place, and time  Psychiatric:         Mood and Affect: Mood normal          Behavior: Behavior normal          Thought Content:  Thought content normal          Judgment: Judgment normal          Pertinent Laboratory/Diagnostic Studies:  Lab Results   Component Value Date    GLUCOSE 114 10/13/2014    BUN 13 02/05/2022    CREATININE 1 02 02/05/2022    CALCIUM 9 6 02/05/2022     10/13/2014    K 4 4 02/05/2022    CO2 27 02/05/2022     02/05/2022     Lab Results   Component Value Date    ALT 19 11/04/2021    AST 13 11/04/2021    ALKPHOS 46 11/04/2021    BILITOT 0 5 10/13/2014       Lab Results   Component Value Date    WBC 12 88 (H) 11/05/2021    HGB 12 3 11/05/2021    HCT 37 1 11/05/2021    MCV 90 11/05/2021     11/05/2021       No results found for: TSH    No results found for: CHOL  Lab Results   Component Value Date    TRIG 48 11/04/2021    TRIG 48 11/04/2021     Lab Results   Component Value Date    HDL 52 11/04/2021    HDL 52 11/04/2021     Lab Results   Component Value Date    LDLCALC 130 (H) 11/04/2021    LDLCALC 130 (H) 11/04/2021     No results found for: HGBA1C    Results for orders placed or performed in visit on 75/95/93   Basic metabolic panel   Result Value Ref Range    Sodium 139 136 - 145 mmol/L    Potassium 4 4 3 5 - 5 3 mmol/L    Chloride 107 100 - 108 mmol/L    CO2 27 21 - 32 mmol/L    ANION GAP 5 4 - 13 mmol/L    BUN 13 5 - 25 mg/dL    Creatinine 1 02 0 60 - 1 30 mg/dL    Glucose 87 65 - 140 mg/dL    Calcium 9 6 8 3 - 10 1 mg/dL    eGFR 72 ml/min/1 73sq m       No orders of the defined types were placed in this encounter  ALLERGIES:  Allergies   Allergen Reactions    Influenza Vaccines Dizziness    Zoloft [Sertraline]        Current Medications     Current Outpatient Medications   Medication Sig Dispense Refill    Ascorbic Acid (VITAMIN C) 500 MG CAPS Take 1 tablet by mouth daily      Cholecalciferol (VITAMIN D-3) 1000 units CAPS Take by mouth daily      predniSONE 5 mg tablet 2 5 mg daily       vitamin E, tocopherol, 400 units capsule Take by mouth daily       No current facility-administered medications for this visit           Health Maintenance     Health Maintenance   Topic Date Due    DXA SCAN  Never done    Colorectal Cancer Screening  12/15/2020    DTaP,Tdap,and Td Vaccines (1 - Tdap) 02/22/2023 (Originally 9/10/1971)    COVID-19 Vaccine (1) 05/26/2023 (Originally 9/10/1955)    Influenza Vaccine (Season Ended) 09/01/2022    Fall Risk  02/22/2023    Depression Screening  02/22/2023    Medicare Annual Wellness Visit (AWV)  02/22/2023    BMI: Adult  05/24/2023    Hepatitis C Screening  Completed    Pneumococcal Vaccine: 65+ Years  Completed    HIB Vaccine  Aged Out    Hepatitis B Vaccine  Aged Out    IPV Vaccine  Aged Out    Hepatitis A Vaccine  Aged Out    Meningococcal ACWY Vaccine  Aged Out    HPV Vaccine  Aged Out     Immunization History   Administered Date(s) Administered    INFLUENZA 09/20/2018    Influenza, high dose seasonal 0 7 mL 09/20/2018, 10/07/2019    Pneumococcal Conjugate 13-Valent 01/16/2017    Pneumococcal Polysaccharide PPV23 09/20/2018    Zoster 06/09/2005, 06/09/2005, 06/09/2005          Sherice Hutton DO

## 2022-05-25 ENCOUNTER — TELEPHONE (OUTPATIENT)
Dept: FAMILY MEDICINE CLINIC | Facility: CLINIC | Age: 72
End: 2022-05-25

## 2022-05-25 NOTE — TELEPHONE ENCOUNTER
Pt called around 6:00 PM to let him know that his labs showed normalization of previously noted abnormalities, and that his CMP and CBC look good

## 2022-05-25 NOTE — RESULT ENCOUNTER NOTE
Please inform pt that his labs show normalization of previously noted abnormalities  Cmp and cbc look good

## 2022-06-28 ENCOUNTER — TELEPHONE (OUTPATIENT)
Dept: FAMILY MEDICINE CLINIC | Facility: CLINIC | Age: 72
End: 2022-06-28

## 2022-06-28 ENCOUNTER — TELEMEDICINE (OUTPATIENT)
Dept: FAMILY MEDICINE CLINIC | Facility: CLINIC | Age: 72
End: 2022-06-28

## 2022-06-28 ENCOUNTER — CLINICAL SUPPORT (OUTPATIENT)
Dept: FAMILY MEDICINE CLINIC | Facility: CLINIC | Age: 72
End: 2022-06-28
Payer: COMMERCIAL

## 2022-06-28 VITALS — TEMPERATURE: 101 F

## 2022-06-28 DIAGNOSIS — M35.3 PMR (POLYMYALGIA RHEUMATICA) (HCC): ICD-10-CM

## 2022-06-28 DIAGNOSIS — Z11.9 ENCOUNTER FOR SCREENING FOR INFECTIOUS AND PARASITIC DISEASES, UNSPECIFIED: Primary | ICD-10-CM

## 2022-06-28 DIAGNOSIS — U07.1 COVID-19 VIRUS INFECTION: Primary | ICD-10-CM

## 2022-06-28 DIAGNOSIS — Z79.52 ON PREDNISONE THERAPY: ICD-10-CM

## 2022-06-28 DIAGNOSIS — R50.9 FEVER, UNSPECIFIED FEVER CAUSE: ICD-10-CM

## 2022-06-28 LAB
SARS-COV-2 AG UPPER RESP QL IA: POSITIVE
VALID CONTROL: ABNORMAL

## 2022-06-28 PROCEDURE — 87811 SARS-COV-2 COVID19 W/OPTIC: CPT

## 2022-06-28 PROCEDURE — 99441 PR PHYS/QHP TELEPHONE EVALUATION 5-10 MIN: CPT | Performed by: FAMILY MEDICINE

## 2022-06-28 RX ORDER — PREDNISONE 1 MG/1
TABLET ORAL
Start: 2022-06-28

## 2022-06-28 NOTE — TELEPHONE ENCOUNTER
Call placed to patient with Positive Rapid COVID result (Nurse Visit)  Patient scheduled for Virtual Visit: Phone Call only 6/28/22 1530 P with Dr Jose Pablo as patient c/o fever of 102 and SORIANO
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
## 2022-06-28 NOTE — PROGRESS NOTES
Patient arrived to  c/o exposure to COVID from [place]  Patient c/o headache, fever of 102  Placed patient on Nurse Visit schedule, tested with rapid COVID-19 test d/t complaints of fever  Result: Positive w/ valid control  Please follow telephone encounter from here

## 2022-06-28 NOTE — RESULT ENCOUNTER NOTE
Please call pt - his covid test is POSITIVE    PLEASE ASK HOW HE IS FEELING  AND IF HE NEEDS A VIRTUAL VISIT TOMORROW - PLEASE HELP HIM TO SCHEDULE  PT IS UNVACCINATED AGAINST COVID

## 2022-07-29 ENCOUNTER — OFFICE VISIT (OUTPATIENT)
Dept: GASTROENTEROLOGY | Facility: MEDICAL CENTER | Age: 72
End: 2022-07-29
Payer: COMMERCIAL

## 2022-07-29 VITALS
WEIGHT: 141.8 LBS | TEMPERATURE: 97.9 F | HEIGHT: 66 IN | DIASTOLIC BLOOD PRESSURE: 80 MMHG | BODY MASS INDEX: 22.79 KG/M2 | OXYGEN SATURATION: 98 % | SYSTOLIC BLOOD PRESSURE: 140 MMHG | HEART RATE: 75 BPM

## 2022-07-29 DIAGNOSIS — Z12.11 COLON CANCER SCREENING: Primary | ICD-10-CM

## 2022-07-29 DIAGNOSIS — K86.2 PANCREATIC CYST: ICD-10-CM

## 2022-07-29 DIAGNOSIS — J38.00 VOCAL CORD PARALYSIS: ICD-10-CM

## 2022-07-29 DIAGNOSIS — R13.10 DYSPHAGIA, UNSPECIFIED TYPE: ICD-10-CM

## 2022-07-29 DIAGNOSIS — R10.9 ABDOMINAL PAIN, UNSPECIFIED ABDOMINAL LOCATION: ICD-10-CM

## 2022-07-29 PROCEDURE — 99214 OFFICE O/P EST MOD 30 MIN: CPT | Performed by: STUDENT IN AN ORGANIZED HEALTH CARE EDUCATION/TRAINING PROGRAM
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
## 2022-08-10 NOTE — RESULT NOTES
Verified Results  * FL ESOPHAGRAM COMPLETE [id number] 09:47AM Kelsy Bo   TW Order Number: [id number]   TW Order Number: [id number]     Test Name Result Flag Reference   FL ESOPHAGRAM COMPLETE (Report)     BARIUM SWALLOW-ESOPHAGRAM     INDICATION: Dysphagia  COMPARISON: None     IMAGES: 39     FLUOROSCOPY TIME: 0 4 minutes     TECHNIQUE:   The patient was given effervescent granules and barium by mouth and images of the esophagus were obtained  FINDINGS:     The esophagus is normal in caliber  Esophageal motility is normal and emptying of contrast from the esophagus is prompt  Gastroesophageal reflux was not observed  There is no hiatal hernia  IMPRESSION:     Unremarkable esophagram        Workstation performed: [id number]     Signed by:    Amisha Vera MD   8/3/16   579 12 negative

## 2022-08-20 DIAGNOSIS — R13.10 DYSPHAGIA, UNSPECIFIED TYPE: ICD-10-CM

## 2022-08-20 RX ORDER — FAMOTIDINE 40 MG/1
TABLET, FILM COATED ORAL
Qty: 90 TABLET | Refills: 0 | Status: SHIPPED | OUTPATIENT
Start: 2022-08-20

## 2022-11-16 ENCOUNTER — RA CDI HCC (OUTPATIENT)
Dept: OTHER | Facility: HOSPITAL | Age: 72
End: 2022-11-16

## 2022-11-16 NOTE — PROGRESS NOTES
[facility]  coding opportunities       Chart reviewed, no opportunity found:   [address]        Patients Insurance     Medicare Insurance: [employer]

## 2022-11-23 ENCOUNTER — OFFICE VISIT (OUTPATIENT)
Dept: FAMILY MEDICINE CLINIC | Facility: CLINIC | Age: 72
End: 2022-11-23

## 2022-11-23 VITALS
RESPIRATION RATE: 16 BRPM | OXYGEN SATURATION: 98 % | WEIGHT: 150 LBS | TEMPERATURE: 97.5 F | BODY MASS INDEX: 24.11 KG/M2 | SYSTOLIC BLOOD PRESSURE: 150 MMHG | HEART RATE: 69 BPM | HEIGHT: 66 IN | DIASTOLIC BLOOD PRESSURE: 90 MMHG

## 2022-11-23 DIAGNOSIS — M35.3 PMR (POLYMYALGIA RHEUMATICA) (HCC): Primary | ICD-10-CM

## 2022-11-23 DIAGNOSIS — Z79.52 CURRENT CHRONIC USE OF SYSTEMIC STEROIDS: ICD-10-CM

## 2022-11-23 DIAGNOSIS — R13.10 DYSPHAGIA, UNSPECIFIED TYPE: ICD-10-CM

## 2022-11-23 DIAGNOSIS — E78.2 MIXED HYPERLIPIDEMIA: ICD-10-CM

## 2022-11-23 DIAGNOSIS — R97.20 ELEVATED PROSTATE SPECIFIC ANTIGEN (PSA): ICD-10-CM

## 2022-11-23 RX ORDER — FAMOTIDINE 40 MG/1
TABLET, FILM COATED ORAL
Qty: 90 TABLET | Refills: 0
Start: 2022-11-23

## 2022-11-23 NOTE — PROGRESS NOTES
FAMILY PRACTICE OFFICE VISIT    NAME: Jason Arango    AGE: 74 y o  SEX: male  : 1950   MRN: [id number]    DATE: 2022  TIME: 1:20 PM    Assessment and Plan    1  Dysphagia, unspecified type  Pt without symptoms  But encouraged pt to take pepcid daily for GI protection on chronic prednisone     - famotidine (PEPCID) 40 MG tablet; TAKE 1 TABLET BY MOUTH AT BEDTIME  Dispense: 90 tablet; Refill: 0    2  PMR (polymyalgia rheumatica) (HCC)  Stable  Seeing rheumatology  3  Mixed hyperlipidemia  Repeat end of year    - Comprehensive metabolic panel; Future  - Lipid panel; Future    4  Elevated prostate specific antigen (PSA)  Going for MRI      5  Current chronic use of systemic steroids  dexa up to date  Calcium and vit D      There are no Patient Instructions on file for this visit  dexa 2021 - 1   Osteopenia of the lumbar spine and right femoral neck corresponding to   increased fracture risk  Chief Complaint     Chief Complaint   Patient presents with   • Follow-up       History of Present Illness   Jason Arango is a 74y o -year-old male who presents today for routine 6 mo visit  Pt stopped taking pepcid - but I asked him to restart since he is on chronic prednisone      Pt sees uro - for PSA monitoring  Scheduled for MRI prostate ; no h/o prostate bx  Had covid 2022 - and took paxlovid - worked well for patient  Review of Systems   Review of Systems   Constitutional: Negative for chills, diaphoresis, fatigue and fever  Rides bike 10 miles 3x/week  And rowing machine - just about daily  HENT: Negative for congestion  Respiratory: Negative for cough, shortness of breath and wheezing  Cardiovascular: Negative for chest pain and palpitations  Gastrointestinal: Negative for abdominal pain, blood in stool, constipation, diarrhea, nausea and vomiting  No GERD   Genitourinary: Negative for dysuria, frequency and hematuria          No recent kidney stones  Nocturia X 0   Musculoskeletal: Negative for arthralgias  Allergic/Immunologic: Negative for environmental allergies         Active Problem List     Patient Active Problem List   Diagnosis   • Arthralgia of multiple joints   • BPH with urinary obstruction   • DDD (degenerative disc disease), cervical   • Elevated prostate specific antigen (PSA)   • Mixed hyperlipidemia   • Nocturia   • Subarachnoid hemorrhage ([facility])   • Vocal cord dysfunction   • PMR (polymyalgia rheumatica) ([facility])   • Bunion   • Chondromalacia patellae   • Abnormal C-reactive protein   • Crystal arthropathy of ankle and foot   • ESR raised   • Hip pain   • Osteoarthritis of knee   • Shoulder joint pain   • Disability of walking   • Kidney stone   • Vocal cord paralysis   • Generalized anxiety disorder   • Idiopathic acute pancreatitis without infection or necrosis   • Mild protein-calorie malnutrition ([facility])   • Acute kidney injury ([facility] )         Past Medical History:  Past Medical History:   Diagnosis Date   • Abdominal pain    • Abscess of right thigh     Last Assessed: 7/29/2016   • 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   • BPH with obstruction/lower urinary tract symptoms    • Closed compression fracture of sacrum ([facility])    • Contusion of lung    • Current chronic use of steroids     off 9/14   • Elevated PSA    • Incomplete bladder emptying    • Inguinal hernia     Last Assessed: 11/7/2014   • Kidney stone    • Nephrolithiasis    • Nocturia    • Pneumothorax, right     Last Assessed: 12/30/2016   • Polymyalgia rheumatica ([facility] ) 2013   • Rheumatic fever     on Pcn x yrs   • Rib fracture    • Right flank pain    • Subdural hematoma 02/2010   • Vocal cord paralysis    • Weak urinary stream        Past Surgical History:  Past Surgical History:   Procedure Laterality Date   • COLONOSCOPY  2010    Normal   Biopsy showed collagenous colitis by Dr Boyd   • ENDOTRACHEAL INTUBATION EMERGENT     • FLEXIBLE SIGMOIDOSCOPY 2012    Normal biopsy showed normal tissue by Dr Jessica Kitchen Right    • THROAT SURGERY      throat Larynx Vocal Cord Mobility - twice   • TRACHEOSTOMY      Emergency       Family History:  Family History   Problem Relation Age of Onset   • Diabetes Mother    • Lung cancer Mother    • Nephrolithiasis Father    • Stomach cancer Maternal Grandmother    • Cancer Family    • Diabetes Family    • Urolithiasis Family        Social History:  Social History     Socioeconomic History   • Marital status:      Spouse name: Not on file   • Number of children: Not on file   • Years of education: Not on file   • Highest education level: Not on file   Occupational History   • Not on file   Tobacco Use   • Smoking status: Never   • Smokeless tobacco: Never   Vaping Use   • Vaping Use: Never used   Substance and Sexual Activity   • Alcohol use: Yes     Comment: Drinks socially; "a few beers a week"  • Drug use: No   • Sexual activity: Not on file   Other Topics Concern   • Not on file   Social History Narrative    Caffeine use     per [employer]    Exercises regularly     Social Determinants of Health     Financial Resource Strain: Not on file   Food Insecurity: Not on file   Transportation Needs: Not on file   Physical Activity: Not on file   Stress: Not on file   Social Connections: Not on file   Intimate Partner Violence: Not on file   Housing Stability: Not on file       Objective     Vitals:    11/23/22 1300   BP: 150/90   Pulse: 69   Resp: 16   Temp: 97 5 °F (36 4 °C)   SpO2: 98%     Wt Readings from Last 3 Encounters:   11/23/22 68 kg (150 lb)   07/29/22 64 3 kg (141 lb 12 8 oz)   05/24/22 65 6 kg (144 lb 9 6 oz)       Physical Exam  Vitals and nursing note reviewed  Constitutional:       General: He is not in acute distress  Appearance: Normal appearance  He is not ill-appearing or toxic-appearing  Neck:      Vascular: No carotid bruit     Cardiovascular:      Rate and Rhythm: Normal rate and regular rhythm  Pulses: Normal pulses  Heart sounds: Normal heart sounds  No murmur heard  Pulmonary:      Effort: Pulmonary effort is normal  No respiratory distress  Breath sounds: Normal breath sounds  No wheezing, rhonchi or rales  Abdominal:      General: There is no distension  Palpations: Abdomen is soft  There is no mass  Tenderness: There is no abdominal tenderness  There is no guarding or rebound  Musculoskeletal:      Cervical back: Neck supple  Right lower leg: No edema  Left lower leg: No edema  Lymphadenopathy:      Cervical: No cervical adenopathy  Skin:     General: Skin is warm  Coloration: Skin is not jaundiced  Neurological:      General: No focal deficit present  Mental Status: He is alert and oriented to person, place, and time  Psychiatric:         Mood and Affect: Mood normal          Behavior: Behavior normal          Thought Content:  Thought content normal          Judgment: Judgment normal          Pertinent Laboratory/Diagnostic Studies:  Lab Results   Component Value Date    GLUCOSE 114 10/13/2014    BUN 18 05/24/2022    CREATININE 1 02 05/24/2022    CALCIUM 9 7 05/24/2022     10/13/2014    K 4 1 05/24/2022    CO2 30 05/24/2022     05/24/2022     Lab Results   Component Value Date    ALT 26 05/24/2022    AST 21 05/24/2022    ALKPHOS 60 05/24/2022    BILITOT 0 5 10/13/2014       Lab Results   Component Value Date    WBC 7 30 05/24/2022    HGB 14 6 05/24/2022    HCT 45 6 05/24/2022    MCV 88 05/24/2022     05/24/2022       No results found for: TSH    No results found for: CHOL  Lab Results   Component Value Date    TRIG 48 11/04/2021    TRIG 48 11/04/2021     Lab Results   Component Value Date    HDL 52 11/04/2021    HDL 52 11/04/2021     Lab Results   Component Value Date    LDLCALC 130 (H) 11/04/2021    LDLCALC 130 (H) 11/04/2021     No results found for: HGBA1C    Results for orders placed or performed in visit on 06/28/22   POCT Rapid Covid Ag   Result Value Ref Range    POCT SARS-CoV-2 Ag Positive (A) Negative    VALID CONTROL Valid        No orders of the defined types were placed in this encounter  ALLERGIES:  Allergies   Allergen Reactions   • Influenza Vaccines Dizziness   • Zoloft [Sertraline]        Current Medications     Current Outpatient Medications   Medication Sig Dispense Refill   • Ascorbic Acid (VITAMIN C) 500 MG CAPS Take 1 tablet by mouth daily     • Cholecalciferol (VITAMIN D-3) 1000 units CAPS Take by mouth daily     • famotidine (PEPCID) 40 MG tablet TAKE 1 TABLET BY MOUTH AT BEDTIME 90 tablet 0   • vitamin E, tocopherol, 400 units capsule Take by mouth daily       No current facility-administered medications for this visit           Health Maintenance     Health Maintenance   Topic Date Due   • Hepatitis B Vaccine (1 of 3 - 3-dose series) Never done   • DXA SCAN  Never done   • Colorectal Cancer Screening  12/15/2020   • Influenza Vaccine (1) 09/01/2022   • Fall Risk  02/22/2023   • Depression Screening  02/22/2023   • Medicare Annual Wellness Visit (AWV)  02/22/2023   • COVID-19 Vaccine (1) 05/26/2023 (Originally 3/10/1951)   • BMI: Adult  07/29/2023   • Hepatitis C Screening  Completed   • Pneumococcal Vaccine: 65+ Years  Completed   • HIB Vaccine  Aged Out   • IPV Vaccine  Aged Out   • Hepatitis A Vaccine  Aged Out   • Meningococcal ACWY Vaccine  Aged Out   • HPV Vaccine  Aged Out     Immunization History   Administered Date(s) Administered   • INFLUENZA 09/20/2018   • Influenza, high dose seasonal 0 7 mL 09/20/2018, 10/07/2019   • Pneumococcal Conjugate 13-Valent 01/16/2017   • Pneumococcal Polysaccharide PPV23 09/20/2018   • Zoster 06/09/2005, 06/09/2005, 06/09/2005          Sven Mathur DO

## 2022-11-23 NOTE — PATIENT INSTRUCTIONS
Repeat fasting labs end of year    MRI prostate as scheduled and followup with urology    Do cologuard

## 2022-11-25 ENCOUNTER — TELEPHONE (OUTPATIENT)
Dept: ADMINISTRATIVE | Facility: OTHER | Age: 72
End: 2022-11-25

## 2022-11-25 NOTE — TELEPHONE ENCOUNTER
----- Message from [place] sent at 11/23/2022  3:05 PM EST -----  Regarding: Dexa  11/23/22 3:07 PM    Mk, our patient Maite Marcus has had DEXA Scan completed/performed  Please assist in updating the patient chart by pulling a previous Electronic Medical Record (EMR) document  The previous EMR is in [facility]    The date of service is 10/4/21      Thank you,  Dorota Sherryle Mare, MA  [facility] PRIMARY [facility]

## 2022-11-25 NOTE — TELEPHONE ENCOUNTER
Upon review of the In Basket request we were able to locate, review, and update the patient chart as requested for DEXA Scan  Any additional questions or concerns should be emailed to the Practice Liaisons via the appropriate education email address, please do not reply via In Basket      Thank you  Rancho Salinas MA

## 2022-11-29 RX ORDER — PREDNISONE 1 MG/1
TABLET ORAL
Qty: 30 TABLET | Refills: 0
Start: 2022-11-29

## 2022-12-16 ENCOUNTER — TELEPHONE (OUTPATIENT)
Dept: UROLOGY | Facility: MEDICAL CENTER | Age: 72
End: 2022-12-16

## 2022-12-16 DIAGNOSIS — R97.20 ELEVATED PROSTATE SPECIFIC ANTIGEN (PSA): Primary | ICD-10-CM

## 2022-12-16 NOTE — TELEPHONE ENCOUNTER
Patient of DR Deny Santiago at [facility]    Patient called stating he would like to have an updated psa order in [facility]  He is going next week for labs and would like to do it as well      Patient can be reached at [phone]

## 2022-12-28 ENCOUNTER — LAB (OUTPATIENT)
Dept: LAB | Facility: MEDICAL CENTER | Age: 72
End: 2022-12-28

## 2022-12-28 DIAGNOSIS — R97.20 ELEVATED PROSTATE SPECIFIC ANTIGEN (PSA): ICD-10-CM

## 2022-12-28 DIAGNOSIS — E78.2 MIXED HYPERLIPIDEMIA: ICD-10-CM

## 2022-12-28 LAB
ALBUMIN SERPL BCP-MCNC: 4.2 G/DL (ref 3.5–5)
ALP SERPL-CCNC: 53 U/L (ref 46–116)
ALT SERPL W P-5'-P-CCNC: 20 U/L (ref 12–78)
ANION GAP SERPL CALCULATED.3IONS-SCNC: 5 MMOL/L (ref 4–13)
AST SERPL W P-5'-P-CCNC: 17 U/L (ref 5–45)
BILIRUB SERPL-MCNC: 0.8 MG/DL (ref 0.2–1)
BUN SERPL-MCNC: 19 MG/DL (ref 5–25)
CALCIUM SERPL-MCNC: 9.3 MG/DL (ref 8.3–10.1)
CHLORIDE SERPL-SCNC: 106 MMOL/L (ref 96–108)
CHOLEST SERPL-MCNC: 215 MG/DL
CO2 SERPL-SCNC: 28 MMOL/L (ref 21–32)
CREAT SERPL-MCNC: 0.96 MG/DL (ref 0.6–1.3)
GFR SERPL CREATININE-BSD FRML MDRD: 77 ML/MIN/1.73SQ M
GLUCOSE P FAST SERPL-MCNC: 100 MG/DL (ref 65–99)
HDLC SERPL-MCNC: 52 MG/DL
LDLC SERPL CALC-MCNC: 138 MG/DL (ref 0–100)
NONHDLC SERPL-MCNC: 163 MG/DL
POTASSIUM SERPL-SCNC: 4.2 MMOL/L (ref 3.5–5.3)
PROT SERPL-MCNC: 7.2 G/DL (ref 6.4–8.4)
PSA SERPL-MCNC: 5.9 NG/ML (ref 0–4)
SODIUM SERPL-SCNC: 139 MMOL/L (ref 135–147)
TRIGL SERPL-MCNC: 126 MG/DL

## 2022-12-28 NOTE — RESULT ENCOUNTER NOTE
Please call pt -   1  Cmp normal  2  Lipids slightly elevated -   advise increase in whole grains - fresh fruits, veggies and whole grain cereals and breads; and less simple sugars, processed foods and white floured products, including decreasing intake if sweetened beverages; also encourage exercise for overall cardiovascular health  3  PSA still elevated   Encourage followup with urology! Keep scheduled followup appt  And happy new year!

## 2023-01-05 ENCOUNTER — TELEPHONE (OUTPATIENT)
Dept: FAMILY MEDICINE CLINIC | Facility: CLINIC | Age: 73
End: 2023-01-05

## 2023-01-05 NOTE — TELEPHONE ENCOUNTER
PATIENT WOULD LIKE A CALL BACK CONCERNING THE MRI THAT DR CEDILLO ORDERED  FOR PATIENT  WAS DENIED BY HIS INSURANCE COMPANY

## 2023-01-06 ENCOUNTER — TELEPHONE (OUTPATIENT)
Dept: FAMILY MEDICINE CLINIC | Facility: CLINIC | Age: 73
End: 2023-01-06

## 2023-01-06 NOTE — TELEPHONE ENCOUNTER
Pt called to inform us of his denial medical coverage for MRI through [facility] , letter received states dose met Medical coverage

## 2023-02-02 ENCOUNTER — OFFICE VISIT (OUTPATIENT)
Dept: UROLOGY | Facility: MEDICAL CENTER | Age: 73
End: 2023-02-02

## 2023-02-02 VITALS
OXYGEN SATURATION: 96 % | WEIGHT: 144 LBS | SYSTOLIC BLOOD PRESSURE: 130 MMHG | BODY MASS INDEX: 23.14 KG/M2 | HEIGHT: 66 IN | DIASTOLIC BLOOD PRESSURE: 76 MMHG | HEART RATE: 64 BPM

## 2023-02-02 DIAGNOSIS — N13.8 BPH WITH URINARY OBSTRUCTION: ICD-10-CM

## 2023-02-02 DIAGNOSIS — R97.20 ELEVATED PROSTATE SPECIFIC ANTIGEN (PSA): Primary | ICD-10-CM

## 2023-02-02 DIAGNOSIS — N40.1 BPH WITH URINARY OBSTRUCTION: ICD-10-CM

## 2023-02-02 RX ORDER — AMOXICILLIN 500 MG/1
CAPSULE ORAL
COMMUNITY

## 2023-02-02 NOTE — PROGRESS NOTES
HISTORY:     Follow-up for mild BPH, no real change the past year   Nocturia times 0-1, daytime good stream and control, no hematuria infections stones        PSA 5 9 in December 2022   6 3 October 2021  4 5 in October 2020  3 8 in October 2019   3 9 in October 2018   2 9 in September 2017  ASSESSMENT / PLAN:    I discussed MRI last year, patient does not recall that discussion  Repeat PSA next week  Likely will order MRI to direct technique for biopsy at that point    The following portions of the patient's history were reviewed and updated as appropriate: allergies, current medications, past family history, past medical history, past social history, past surgical history and problem list     Review of Systems   All other systems reviewed and are negative  Objective:     Physical Exam  Constitutional:       Appearance: Normal appearance  Genitourinary:     Comments: Penis testes normal    Prostate minimally enlarged no nodules  Neurological:      Mental Status: He is alert             0   Lab Value Date/Time    PSA 5 9 (H) 12/28/2022 0758    PSA 6 3 (H) 10/29/2021 1014    PSA 4 5 (H) 10/20/2020 1337    PSA 3 8 10/07/2019 1151    PSA 3 9 10/01/2018 1143   ]  BUN   Date Value Ref Range Status   12/28/2022 19 5 - 25 mg/dL Final   10/13/2014 18 5 - 27 mg/dL Final     Creatinine   Date Value Ref Range Status   12/28/2022 0 96 0 60 - 1 30 mg/dL Final     Comment:     Standardized to IDMS reference method   10/13/2014 0 95 0 60 - 1 30 mg/dL Final     Comment:     Standardized to IDMS reference method     No components found for: CBC      Patient Active Problem List   Diagnosis   • Arthralgia of multiple joints   • BPH with urinary obstruction   • DDD (degenerative disc disease), cervical   • Elevated prostate specific antigen (PSA)   • Mixed hyperlipidemia   • Nocturia   • Subarachnoid hemorrhage ([facility])   • Vocal cord dysfunction   • PMR (polymyalgia rheumatica) ([facility])   • Bunion   • Chondromalacia patellae   • Abnormal C-reactive protein   • Crystal arthropathy of ankle and foot   • ESR raised   • Hip pain   • Osteoarthritis of knee   • Shoulder joint pain   • Disability of walking   • Kidney stone   • Vocal cord paralysis   • Generalized anxiety disorder   • Idiopathic acute pancreatitis without infection or necrosis   • Mild protein-calorie malnutrition (HCC)   • Acute kidney injury ([facility] )   • Current chronic use of systemic steroids        Diagnoses and all orders for this visit:    Elevated prostate specific antigen (PSA)  -     PSA, total and free; Future    BPH with urinary obstruction    Other orders  -     amoxicillin (AMOXIL) 500 mg capsule; amoxicillin 500 mg capsule   TAKE 4 CAPSULES BY MOUTH 1 HOUR PRIOR TO VISIT           Patient ID: Carlee Rodarte is a 74 y o  male        Current Outpatient Medications:   •  Ascorbic Acid (VITAMIN C) 500 MG CAPS, Take 1 tablet by mouth daily, Disp: , Rfl:   •  Cholecalciferol (VITAMIN D-3) 1000 units CAPS, Take by mouth daily, Disp: , Rfl:   •  famotidine (PEPCID) 40 MG tablet, TAKE 1 TABLET BY MOUTH AT BEDTIME, Disp: 90 tablet, Rfl: 0  •  predniSONE 5 mg tablet, Currently 2 5 every other day for PMR; or dose as directed by rheumatology, Disp: 30 tablet, Rfl: 0  •  vitamin E, tocopherol, 400 units capsule, Take by mouth daily, Disp: , Rfl:     Past Medical History:   Diagnosis Date   • Abdominal pain    • Abscess of right thigh     Last Assessed: 7/29/2016   • 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   • BPH with obstruction/lower urinary tract symptoms    • Closed compression fracture of sacrum (HCC)    • Contusion of lung    • Current chronic use of steroids     off 9/14   • Elevated PSA    • Incomplete bladder emptying    • Inguinal hernia     Last Assessed: 11/7/2014   • Kidney stone    • Nephrolithiasis    • Nocturia    • Pneumothorax, right     Last Assessed: 12/30/2016   • Polymyalgia rheumatica ([facility] ) 2013   • Rheumatic fever     on Pcn x yrs   • Rib fracture    • Right flank pain    • Subdural hematoma 02/2010   • Vocal cord paralysis    • Weak urinary stream        Past Surgical History:   Procedure Laterality Date   • COLONOSCOPY  2010    Normal   Biopsy showed collagenous colitis by Dr Boyd   • ENDOTRACHEAL INTUBATION EMERGENT     • FLEXIBLE SIGMOIDOSCOPY  2012    Normal biopsy showed normal tissue by Dr Sherita Galeazzi Right    • THROAT SURGERY      throat Larynx Vocal Cord Mobility - twice   • TRACHEOSTOMY      Emergency       Social History

## 2023-02-17 ENCOUNTER — APPOINTMENT (OUTPATIENT)
Dept: LAB | Facility: MEDICAL CENTER | Age: 73
End: 2023-02-17
Attending: UROLOGY

## 2023-02-17 DIAGNOSIS — R97.20 ELEVATED PROSTATE SPECIFIC ANTIGEN (PSA): ICD-10-CM

## 2023-02-18 LAB
PSA FREE MFR SERPL: 16.8 %
PSA FREE SERPL-MCNC: 1.04 NG/ML
PSA SERPL-MCNC: 6.2 NG/ML (ref 0–4)

## 2023-02-21 DIAGNOSIS — R97.20 ELEVATED PROSTATE SPECIFIC ANTIGEN (PSA): Primary | ICD-10-CM

## 2023-02-21 NOTE — PROGRESS NOTES
Telephone conversation with patient:    Patient PSA still elevated persistent elevation makes him at high risk of cancer  Conrado Arechiga MRI is required for planning of biopsy, I will place the order and if further documentation is required, we will provide it

## 2023-02-22 ENCOUNTER — TELEPHONE (OUTPATIENT)
Dept: UROLOGY | Facility: MEDICAL CENTER | Age: 73
End: 2023-02-22

## 2023-02-22 NOTE — TELEPHONE ENCOUNTER
----- Message from Dina Madrid MD sent at 2/21/2023  4:44 PM EST -----   I ordered MRI  Patient now tells me that PCP tried to order MRI some time recently, and insurance declined it    Wes Barillas, let me know if my notes require any more documentation to get this approved

## 2023-02-22 NOTE — TELEPHONE ENCOUNTER
I spoke with patient and let him know he needs to call central scheduling to schedule the MRI before we can start the auth  Pt will call

## 2023-03-02 ENCOUNTER — TELEPHONE (OUTPATIENT)
Dept: ADMINISTRATIVE | Facility: HOSPITAL | Age: 73
End: 2023-03-02

## 2023-03-03 NOTE — TELEPHONE ENCOUNTER
Chart Review  72/M with elevated psa  MRI required for planning of biopsy  Duplicate order of MRI from pcp just prior, only needs one  No prior BX, TRUS, or MRI    Lab Results   Component Value Date    PSA 6.2 (H) 02/17/2023    PSA 5.9 (H) 12/28/2022    PSA 6.3 (H) 10/29/2021       Image Requested: mpMRI prostate with 3D  Image Denial reason: no documentation of plan for biopsy. Updated- see telephone note from Lalo. She approved that part. 3D denied pending pirads result 4 or higher  Alternate Image Suggested: MRI pelvis first, then 3D later    Patient Insurance Co: [employer] ([employer])    P2P completed via telephone    Outcome: Proeed with MRI pelvis w/wo, will await pirads before resubmitting for 3D postprocessing  Approval #s: MRI pelvis w/wo [id number] exp 6 months    Please have pt proceed with [id number] the MRI pelvis w/wo first. Thanks

## 2023-03-06 ENCOUNTER — HOSPITAL ENCOUNTER (OUTPATIENT)
Facility: MEDICAL CENTER | Age: 73
Discharge: HOME | End: 2023-03-06
Attending: UROLOGY

## 2023-03-06 DIAGNOSIS — R97.20 ELEVATED PROSTATE SPECIFIC ANTIGEN (PSA): ICD-10-CM

## 2023-03-06 RX ADMIN — GADOBUTROL 6 ML: 604.72 INJECTION INTRAVENOUS at 12:19

## 2023-03-10 ENCOUNTER — TELEPHONE (OUTPATIENT)
Dept: OTHER | Facility: OTHER | Age: 73
End: 2023-03-10

## 2023-03-10 NOTE — RESULT ENCOUNTER NOTE
Tell pt: MRI shows nothing suspicious    I recommend repeat PSA in 6 months and then decide on biopsy

## 2023-03-10 NOTE — TELEPHONE ENCOUNTER
Patient requesting a call back to discuss test results         Ordering Provider: Lin Galvan  Date Completed: 3-6-2023    Lab []  MRI [x]  X-Ray []  CT []  Colonoscopy []  EGD []  Biopsy []  Other []

## 2023-03-13 ENCOUNTER — TELEPHONE (OUTPATIENT)
Dept: UROLOGY | Facility: MEDICAL CENTER | Age: 73
End: 2023-03-13

## 2023-03-13 NOTE — TELEPHONE ENCOUNTER
----- Message from Dejon Christensen MD sent at 3/10/2023  4:32 PM EST -----  Tell pt: MRI shows nothing suspicious  I recommend repeat PSA in 6 months and then decide on biopsy  Pt is aware of results  Pt had seen them on My Chart yesterday

## 2023-05-18 ENCOUNTER — RA CDI HCC (OUTPATIENT)
Dept: OTHER | Facility: HOSPITAL | Age: 73
End: 2023-05-18

## 2023-05-18 NOTE — PROGRESS NOTES
[facility]  coding opportunities       Chart reviewed, no opportunity found:   [address]        Patients Insurance     Medicare Insurance: [employer]

## 2023-05-25 ENCOUNTER — OFFICE VISIT (OUTPATIENT)
Dept: FAMILY MEDICINE CLINIC | Facility: CLINIC | Age: 73
End: 2023-05-25

## 2023-05-25 VITALS
SYSTOLIC BLOOD PRESSURE: 134 MMHG | OXYGEN SATURATION: 97 % | WEIGHT: 146 LBS | TEMPERATURE: 97.8 F | HEIGHT: 66 IN | DIASTOLIC BLOOD PRESSURE: 80 MMHG | HEART RATE: 66 BPM | BODY MASS INDEX: 23.46 KG/M2

## 2023-05-25 DIAGNOSIS — M35.3 PMR (POLYMYALGIA RHEUMATICA) (HCC): ICD-10-CM

## 2023-05-25 DIAGNOSIS — Z00.00 MEDICARE ANNUAL WELLNESS VISIT, SUBSEQUENT: Primary | ICD-10-CM

## 2023-05-25 DIAGNOSIS — J38.00 VOCAL CORD PARALYSIS: ICD-10-CM

## 2023-05-25 DIAGNOSIS — E78.2 MIXED HYPERLIPIDEMIA: ICD-10-CM

## 2023-05-25 DIAGNOSIS — Z79.52 CURRENT CHRONIC USE OF SYSTEMIC STEROIDS: ICD-10-CM

## 2023-05-25 PROBLEM — Z87.19 HISTORY OF PANCREATITIS: Status: ACTIVE | Noted: 2021-11-04

## 2023-05-25 PROBLEM — Z86.79 HISTORY OF RHEUMATIC FEVER AS A CHILD: Status: ACTIVE | Noted: 2023-05-25

## 2023-05-25 NOTE — PATIENT INSTRUCTIONS
"Reviewed health history along with medications  He is doing well here today, does continue on long-term prednisone for PMR, is due for a visit with [facility] rheumatology-currently on 2 5 mg daily  Continues with routine exercise, bikes 10 miles 2 times weekly, fatou vigorously for 30 minutes every other day without issue  Blood pressure is good here today, did have stress test few years ago, had rheumatic fever as a child, no significant shortness of breath/murmur  Does have some chronic upper respiratory symptoms related to prior injury/hoarseness  He will be due for DEXA scan  We did review previous blood work, December CMP was okay with glucose 100  He is up to date with Lipid screening  Cholesterol back in December was 215 with HDL 52,   He wants to hold off on statin, redo lipids yearly  He is up to date with Diabetes screening  Immunization History   Administered Date(s) Administered    INFLUENZA 09/20/2018    Influenza, high dose seasonal 0 7 mL 09/20/2018, 10/07/2019    Pneumococcal Conjugate 13-Valent 01/16/2017    Pneumococcal Polysaccharide PPV23 09/20/2018    Zoster 06/09/2005, 06/09/2005, 06/09/2005       Discussed Vaccines,    Pneumococcal vax is up to date  He does not do yearly Flu shot  Tdap/tetanus shot is due - can do at pharmacy (done every 10 yrs for superficial cuts, every 5 yrs for deep wounds)   Can also look into coverage for 'shingles' shot, Shingrix  Can do that at pharmacy  Covid vaccine declined-   had Covid 6/22    Non-smoker    He does see GI, saw Dr Carly Kahn July of last year, has slip to do Cologuard, plans to do that, last colonoscopy was negative in 2010  Plans to redo MRI regarding pancreatic cyst in 2024       Saw urology in February, PSA was 6 2, MRI in March looked okay, plans to redo PSA in August     We discussed end of life planning, does have a  \"LIVING WILL\" through his     Regarding Hepatitis C Screening, he will not do Hepatitis C blood test    " previously perfomed and was negative    Glaucoma screening is up-to-date  Does see dermatology  Discussed importance of routine exercise, healthy diet as is    We will see him again in 12 months, sooner as needed

## 2023-05-25 NOTE — PROGRESS NOTES
Depression Screening and Follow-up Plan: Patient was screened for depression during today's encounter  They screened negative with a PHQ-2 score of 0  Jeffry Oviedo [facility]  [address]  [address]  [facility], [address]     MEDICARE SUBSEQUENT VISIT NOTE ( part 1 )      NAME: Jo Ann Millan  AGE: 74 y o  SEX: male  : 1950   MRN: [id number]    DATE: 2023  TIME: 1:31 PM    Assessment and Plan     Problem List Items Addressed This Visit     Current chronic use of systemic steroids    PMR (polymyalgia rheumatica) (HCC)    Vocal cord paralysis    Mixed hyperlipidemia   Other Visit Diagnoses     Medicare annual wellness visit, subsequent    -  Primary          Medicare Wellness Counseling/ Discussion    Patient Instructions     Reviewed health history along with medications  He is doing well here today, does continue on long-term prednisone for PMR, is due for a visit with [facility] rheumatology-currently on 2 5 mg daily  Continues with routine exercise, bikes 10 miles 2 times weekly, fatou vigorously for 30 minutes every other day without issue  Blood pressure is good here today, did have stress test few years ago, had rheumatic fever as a child, no significant shortness of breath/murmur  Does have some chronic upper respiratory symptoms related to prior injury/hoarseness  He will be due for DEXA scan  We did review previous blood work, December CMP was okay with glucose 100  He is up to date with Lipid screening  Cholesterol back in December was 215 with HDL 52,   He wants to hold off on statin, redo lipids yearly  He is up to date with Diabetes screening      Immunization History   Administered Date(s) Administered   • INFLUENZA 2018   • Influenza, high dose seasonal 0 7 mL 2018, 10/07/2019   • Pneumococcal Conjugate 13-Valent 2017   • Pneumococcal Polysaccharide PPV23 2018   • Zoster 2005, "06/09/2005, 06/09/2005       Discussed Vaccines,    Pneumococcal vax is up to date  He does not do yearly Flu shot  Tdap/tetanus shot is due - can do at pharmacy (done every 10 yrs for superficial cuts, every 5 yrs for deep wounds)   Can also look into coverage for 'shingles' shot, Shingrix  Can do that at pharmacy  Covid vaccine declined-   had Covid 6/22    Non-smoker    He does see GI, saw Dr Sharita Arroyo July of last year, has slip to do Cologuard, plans to do that, last colonoscopy was negative in 2010  Plans to redo MRI regarding pancreatic cyst in 2024  Saw urology in February, PSA was 6 2, MRI in March looked okay, plans to redo PSA in August     We discussed end of life planning, does have a  \"LIVING WILL\" through his     Regarding Hepatitis C Screening, he will not do Hepatitis C blood test    previously perfomed and was negative    Glaucoma screening is up-to-date  Does see dermatology  Discussed importance of routine exercise, healthy diet as is    We will see him again in 12 months, sooner as needed  Chief Complaint     Chief Complaint   Patient presents with   • Medicare Wellness Visit       History of Present Illness     Candido Sloan is a 74y o -year-old male who is in for a routine annual wellness check, he has been feeling well overall, does continue on prednisone regarding history of PMR, try to decrease/come off last year but noted flare, currently on 2 5 mg prednisone daily  Continues to exercise routinely, bicycles twice weekly, uses a rowing machine for 30 minutes multiple times a week, no chest pain, shortness of breath etc   Chronic hoarseness unchanged    Sees urology along with GI    Review of Systems     Review of Systems   Constitutional: Negative for appetite change, fatigue, fever and unexpected weight change  HENT: Positive for voice change (Chronic unchanged)  Negative for sore throat and trouble swallowing      Respiratory: Negative for cough, chest " tightness and shortness of breath  Cardiovascular: Negative for chest pain, palpitations and leg swelling  Gastrointestinal: Negative for abdominal pain, blood in stool, nausea and vomiting  No acid reflux     No change in bowel   Genitourinary: Negative for dysuria and hematuria  Neurological: Negative for dizziness, syncope, light-headedness and headaches  Psychiatric/Behavioral: Negative for behavioral problems and confusion         Active Problem List     Patient Active Problem List   Diagnosis   • Arthralgia of multiple joints   • BPH with urinary obstruction   • DDD (degenerative disc disease), cervical   • Elevated prostate specific antigen (PSA)   • Mixed hyperlipidemia   • History of subarachnoid hemorrhage 2010   • PMR (polymyalgia rheumatica) ([facility])   • Bunion   • Crystal arthropathy of ankle and foot   • Hip pain   • Osteoarthritis of knee   • Shoulder joint pain   • Disability of walking   • Kidney stone   • Vocal cord paralysis   • Generalized anxiety disorder   • History of pancreatitis   • Current chronic use of systemic steroids   • History of rheumatic fever as a child       Past Medical History:  Past Medical History:   Diagnosis Date   • Abdominal pain    • Abscess of right thigh     Last Assessed: 7/29/2016   • 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   • BPH with obstruction/lower urinary tract symptoms    • Closed compression fracture of sacrum ([facility])    • Contusion of lung    • Current chronic use of steroids     off 9/14   • Elevated PSA    • Incomplete bladder emptying    • Inguinal hernia     Last Assessed: 11/7/2014   • Kidney stone    • Nephrolithiasis    • Nocturia    • Pneumothorax, right     Last Assessed: 12/30/2016   • Polymyalgia rheumatica ([facility] [address] ) 2013   • Rheumatic fever     on Pcn x yrs   • Rib fracture    • Right flank pain    • Subdural hematoma ([facility] [address] ) 02/2010   • Vocal cord paralysis    • Weak urinary stream        Past Surgical History:  Past "Surgical History:   Procedure Laterality Date   • COLONOSCOPY  2010    Normal   Biopsy showed collagenous colitis by Dr Boyd   • ENDOTRACHEAL INTUBATION EMERGENT     • FLEXIBLE SIGMOIDOSCOPY  2012    Normal biopsy showed normal tissue by Dr Lawyer Chavez   • [address] Right    • THROAT SURGERY      throat Larynx Vocal Cord Mobility - twice   • TRACHEOSTOMY      Emergency       Family History:  Family History   Problem Relation Age of Onset   • Diabetes Mother    • Lung cancer Mother    • Nephrolithiasis Father    • Stomach cancer Maternal Grandmother    • Cancer Family    • Diabetes Family    • Urolithiasis Family        Social History:  Social History     Tobacco Use   • Smoking status: Never   • Smokeless tobacco: Never   Substance Use Topics   • Alcohol use: Yes     Comment: Drinks socially; \"a few beers a week\"  Objective     Vitals:    05/25/23 1105   BP: 134/80   BP Location: Left arm   Patient Position: Sitting   Cuff Size: Standard   Pulse: 66   Temp: 97 8 °F (36 6 °C)   SpO2: 97%   Weight: 66 2 kg (146 lb)   Height: 5' 6\" (1 676 m)     Body mass index is 23 57 kg/m²  BP Readings from Last 3 Encounters:   05/25/23 134/80   02/02/23 130/76   11/23/22 150/90       Wt Readings from Last 3 Encounters:   05/25/23 66 2 kg (146 lb)   02/02/23 65 3 kg (144 lb)   11/23/22 68 kg (150 lb)       Physical Exam  Constitutional:       General: He is not in acute distress  Appearance: Normal appearance  He is well-developed  He is not ill-appearing  HENT:      Right Ear: Tympanic membrane normal       Left Ear: Tympanic membrane normal       Mouth/Throat:      Pharynx: Oropharynx is clear  Eyes:      General: No scleral icterus  Neck:      Vascular: No carotid bruit  Cardiovascular:      Rate and Rhythm: Normal rate and regular rhythm  Heart sounds: Normal heart sounds  No murmur heard  Pulmonary:      Effort: Pulmonary effort is normal  No respiratory distress        Breath sounds: Normal " breath sounds  No wheezing, rhonchi or rales  Abdominal:      Palpations: Abdomen is soft  Tenderness: There is no abdominal tenderness  Musculoskeletal:      Right lower leg: No edema  Left lower leg: No edema  Lymphadenopathy:      Cervical: No cervical adenopathy  Skin:     Coloration: Skin is not jaundiced  Neurological:      Mental Status: He is alert and oriented to person, place, and time  Mental status is at baseline  Psychiatric:         Mood and Affect: Mood normal          Behavior: Behavior normal          ALLERGIES:  Allergies   Allergen Reactions   • Influenza Vaccines Dizziness   • Zoloft [Sertraline]        Current Medications     Current Outpatient Medications   Medication Sig Dispense Refill   • Ascorbic Acid (VITAMIN C) 500 MG CAPS Take 1 tablet by mouth daily     • Cholecalciferol (VITAMIN D-3) 1000 units CAPS Take by mouth daily     • predniSONE 5 mg tablet Currently 2 5 every other day for PMR; or dose as directed by rheumatology (Patient taking differently: Take 2 5 mg by mouth daily Currently 2 5 every other day for PMR; or dose as directed by rheumatology) 30 tablet 0   • vitamin E, tocopherol, 400 units capsule Take by mouth daily     • amoxicillin (AMOXIL) 500 mg capsule amoxicillin 500 mg capsule   TAKE 4 CAPSULES BY MOUTH 1 HOUR PRIOR TO VISIT (Patient not taking: Reported on 5/25/2023)       No current facility-administered medications for this visit           Health Maintenance     See other note today re clinical AWV info             [facility] [address]   ( others may be available in Care Everywhere / Media sections)  Lab Results   Component Value Date    ALT 20 12/28/2022    AST 17 12/28/2022    BUN 19 12/28/2022     12/28/2022    CO2 28 12/28/2022    CREATININE 0 96 12/28/2022    GLUF 100 (H) 12/28/2022    HCT 45 6 05/24/2022    HDL 52 12/28/2022    HGB 14 6 05/24/2022    K 4 2 12/28/2022     10/13/2014     05/24/2022    PSA 6 2 (H) 02/17/2023    TRIG 126 12/28/2022    WBC 7 30 05/24/2022       No orders of the defined types were placed in this encounter              Constantine Lazo DO

## 2023-05-25 NOTE — PROGRESS NOTES
Assessment and Plan:     Problem List Items Addressed This Visit     Current chronic use of systemic steroids    PMR (polymyalgia rheumatica) ([facility])    Vocal cord paralysis    Mixed hyperlipidemia   Other Visit Diagnoses     Medicare annual wellness visit, subsequent    -  Primary           Preventive health issues were discussed with patient, and age appropriate screening tests were ordered as noted in patient's After Visit Summary  Personalized health advice and appropriate referrals for health education or preventive services given if needed, as noted in patient's After Visit Summary      See other note today regarding provider information       History of Present Illness:     Patient presents for a Medicare Wellness Visit      Patient Care Team:  Laura Mehta DO as PCP - General (Family Medicine)  Rhoda Hodge MD       Problem List:     Patient Active Problem List   Diagnosis   • Arthralgia of multiple joints   • BPH with urinary obstruction   • DDD (degenerative disc disease), cervical   • Elevated prostate specific antigen (PSA)   • Mixed hyperlipidemia   • History of subarachnoid hemorrhage 2010   • PMR (polymyalgia rheumatica) ([facility] [address] )   • Bunion   • Crystal arthropathy of ankle and foot   • Hip pain   • Osteoarthritis of knee   • Shoulder joint pain   • Disability of walking   • Kidney stone   • Vocal cord paralysis   • Generalized anxiety disorder   • History of pancreatitis   • Current chronic use of systemic steroids   • History of rheumatic fever as a child      Past Medical and Surgical History:     Past Medical History:   Diagnosis Date   • Abdominal pain    • Abscess of right thigh     Last Assessed: 7/29/2016   • 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   • BPH with obstruction/lower urinary tract symptoms    • Closed compression fracture of sacrum (HCC)    • Contusion of lung    • Current chronic use of steroids     off 9/14   • Elevated PSA    • Incomplete bladder emptying "  • Inguinal hernia     Last Assessed: 11/7/2014   • Kidney stone    • Nephrolithiasis    • Nocturia    • Pneumothorax, right     Last Assessed: 12/30/2016   • Polymyalgia rheumatica ([facility] [address] ) 2013   • Rheumatic fever     on Pcn x yrs   • Rib fracture    • Right flank pain    • Subdural hematoma ([facility] [address] ) 02/2010   • Vocal cord paralysis    • Weak urinary stream      Past Surgical History:   Procedure Laterality Date   • COLONOSCOPY  2010    Normal   Biopsy showed collagenous colitis by Dr Boyd   • ENDOTRACHEAL INTUBATION EMERGENT     • FLEXIBLE SIGMOIDOSCOPY  2012    Normal biopsy showed normal tissue by Dr Mar Brothers   • [address] Right    • THROAT SURGERY      throat Larynx Vocal Cord Mobility - twice   • TRACHEOSTOMY      Emergency      Family History:     Family History   Problem Relation Age of Onset   • Diabetes Mother    • Lung cancer Mother    • Nephrolithiasis Father    • Stomach cancer Maternal Grandmother    • Cancer Family    • Diabetes Family    • Urolithiasis Family       Social History:     Social History     Socioeconomic History   • Marital status:      Spouse name: None   • Number of children: None   • Years of education: None   • Highest education level: None   Occupational History   • None   Tobacco Use   • Smoking status: Never   • Smokeless tobacco: Never   Vaping Use   • Vaping Use: Never used   Substance and Sexual Activity   • Alcohol use: Yes     Comment: Drinks socially; \"a few beers a week\"     • Drug use: No   • Sexual activity: None   Other Topics Concern   • None   Social History Narrative    Caffeine use     per [employer]    Exercises regularly     Social Determinants of Health     Financial Resource Strain: Not on file   Food Insecurity: Not on file   Transportation Needs: Not on file   Physical Activity: Not on file   Stress: Not on file   Social Connections: Not on file   Intimate Partner Violence: Not on file   Housing Stability: Not on file      Medications " and Allergies:     Current Outpatient Medications   Medication Sig Dispense Refill   • Ascorbic Acid (VITAMIN C) 500 MG CAPS Take 1 tablet by mouth daily     • Cholecalciferol (VITAMIN D-3) 1000 units CAPS Take by mouth daily     • predniSONE 5 mg tablet Currently 2 5 every other day for PMR; or dose as directed by rheumatology (Patient taking differently: Take 2 5 mg by mouth daily Currently 2 5 every other day for PMR; or dose as directed by rheumatology) 30 tablet 0   • vitamin E, tocopherol, 400 units capsule Take by mouth daily     • amoxicillin (AMOXIL) 500 mg capsule amoxicillin 500 mg capsule   TAKE 4 CAPSULES BY MOUTH 1 HOUR PRIOR TO VISIT (Patient not taking: Reported on 5/25/2023)       No current facility-administered medications for this visit  Allergies   Allergen Reactions   • Influenza Vaccines Dizziness   • Zoloft [Sertraline]       Immunizations:     Immunization History   Administered Date(s) Administered   • INFLUENZA 09/20/2018   • Influenza, high dose seasonal 0 7 mL 09/20/2018, 10/07/2019   • Pneumococcal Conjugate 13-Valent 01/16/2017   • Pneumococcal Polysaccharide PPV23 09/20/2018   • Zoster 06/09/2005, 06/09/2005, 06/09/2005      Health Maintenance:         Topic Date Due   • Colorectal Cancer Screening  12/15/2020   • DXA SCAN  06/04/2023   • Hepatitis C Screening  Completed     There are no preventive care reminders to display for this patient  Medicare Screening Tests and Risk Assessments:     Dany Atwood is here for his Subsequent Wellness visit  Health Risk Assessment:   Patient rates overall health as very good  Patient feels that their physical health rating is same  Patient is satisfied with their life  Eyesight was rated as same  Hearing was rated as same  Patient feels that their emotional and mental health rating is same  Patients states they are sometimes angry  Patient states they are sometimes unusually tired/fatigued  Pain experienced in the last 7 days has been none  Patient states that he has experienced no weight loss or gain in last 6 months  Depression Screening:   PHQ-2 Score: 0      Fall Risk Screening: In the past year, patient has experienced: no history of falling in past year      Home Safety:  Patient does not have trouble with stairs inside or outside of their home  Patient has working smoke alarms and has working carbon monoxide detector  Home safety hazards include: none  Nutrition:   Current diet is Regular  Medications:   Patient is currently taking over-the-counter supplements  OTC medications include: see medication list  Patient is able to manage medications  Activities of Daily Living (ADLs)/Instrumental Activities of Daily Living (IADLs):   Walk and transfer into and out of bed and chair?: Yes  Dress and groom yourself?: Yes    Bathe or shower yourself?: Yes    Feed yourself?  Yes  Do your laundry/housekeeping?: Yes  Manage your money, pay your bills and track your expenses?: Yes  Make your own meals?: Yes    Do your own shopping?: Yes    Previous Hospitalizations:   Any hospitalizations or ED visits within the last 12 months?: No      Advance Care Planning:   Living will: No    Durable POA for healthcare: No    Advanced directive: No    Five wishes given: Yes      PREVENTIVE SCREENINGS      Cardiovascular Screening:    General: Screening Not Indicated and History Lipid Disorder      Diabetes Screening:     General: Screening Current      Prostate Cancer Screening:    General: Screening Current      Osteoporosis Screening:    General: Screening Current      Abdominal Aortic Aneurysm (AAA) Screening:    Risk factors include: age between 73-67 yo        Lung Cancer Screening:     General: Screening Not Indicated      Hepatitis C Screening:    General: Screening Current    Screening, Brief Intervention, and Referral to Treatment (SBIRT)    Screening  Typical number of drinks in a day: 0  Typical number of drinks in a week: 1  Interpretation: Low risk drinking behavior          Shahnaz Dodge, DO

## 2023-06-21 ENCOUNTER — VBI (OUTPATIENT)
Dept: ADMINISTRATIVE | Facility: OTHER | Age: 73
End: 2023-06-21

## 2024-01-25 ENCOUNTER — TELEPHONE (OUTPATIENT)
Dept: UROLOGY | Facility: MEDICAL CENTER | Age: 74
End: 2024-01-25

## 2024-01-25 ENCOUNTER — TELEPHONE (OUTPATIENT)
Age: 74
End: 2024-01-25

## 2024-01-25 DIAGNOSIS — R97.20 ELEVATED PROSTATE SPECIFIC ANTIGEN (PSA): Primary | ICD-10-CM

## 2024-01-25 NOTE — TELEPHONE ENCOUNTER
New order for PSA placed in Epic and pt was called and notified. Spoke with patient and informed him to have it done 1-2 weeks prior to his next scheduled appt.

## 2024-01-25 NOTE — TELEPHONE ENCOUNTER
Patient has a yearly follow up with Dr May on 3/19 at 2:15 in [place].    Patient will need a PSA order placed prior to this visit. Last PSA was done on 2/17/23.      Patient requesting a call back once the order is placed at [phone]

## 2024-03-05 ENCOUNTER — APPOINTMENT (OUTPATIENT)
Dept: LAB | Facility: MEDICAL CENTER | Age: 74
End: 2024-03-05
Attending: UROLOGY
Payer: COMMERCIAL

## 2024-03-05 DIAGNOSIS — R97.20 ELEVATED PROSTATE SPECIFIC ANTIGEN (PSA): ICD-10-CM

## 2024-03-05 LAB — PSA SERPL-MCNC: 8.34 NG/ML (ref 0–4)

## 2024-03-05 PROCEDURE — 36415 COLL VENOUS BLD VENIPUNCTURE: CPT

## 2024-03-05 PROCEDURE — 84153 ASSAY OF PSA TOTAL: CPT

## 2024-03-13 RX ORDER — FAMOTIDINE 40 MG/1
40 TABLET, FILM COATED ORAL
COMMUNITY
End: 2024-03-18 | Stop reason: ALTCHOICE

## 2024-03-19 ENCOUNTER — OFFICE VISIT (OUTPATIENT)
Dept: UROLOGY | Facility: MEDICAL CENTER | Age: 74
End: 2024-03-19
Payer: COMMERCIAL

## 2024-03-19 ENCOUNTER — NURSE TRIAGE (OUTPATIENT)
Age: 74
End: 2024-03-19

## 2024-03-19 VITALS
HEART RATE: 67 BPM | OXYGEN SATURATION: 97 % | WEIGHT: 132 LBS | SYSTOLIC BLOOD PRESSURE: 130 MMHG | BODY MASS INDEX: 21.21 KG/M2 | DIASTOLIC BLOOD PRESSURE: 80 MMHG | HEIGHT: 66 IN

## 2024-03-19 DIAGNOSIS — N13.8 BPH WITH URINARY OBSTRUCTION: ICD-10-CM

## 2024-03-19 DIAGNOSIS — N40.1 BPH WITH URINARY OBSTRUCTION: ICD-10-CM

## 2024-03-19 DIAGNOSIS — R97.20 ELEVATED PROSTATE SPECIFIC ANTIGEN (PSA): Primary | ICD-10-CM

## 2024-03-19 PROCEDURE — 99214 OFFICE O/P EST MOD 30 MIN: CPT | Performed by: UROLOGY

## 2024-03-19 NOTE — ASSESSMENT & PLAN NOTE
PSA continues to rise and is presently 8.34. MRI last year was PIRADS 2. Risk of cancer discussed. I recommended he proceed with office TRUS/BX. Procedure described and risks discussed.

## 2024-03-19 NOTE — PROGRESS NOTES
Assessment/Plan:    Elevated prostate specific antigen (PSA)  PSA continues to rise and is presently 8.34. MRI last year was PIRADS 2. Risk of cancer discussed. I recommended he proceed with office TRUS/BX. Procedure described and risks discussed.     BPH with urinary obstruction  AUA SS is 0. He is pleased with his voiding pattern. We will continue watchful waiting.       Diagnoses and all orders for this visit:    Elevated prostate specific antigen (PSA)  -     Biopsy prostate; Future    BPH with urinary obstruction          Subjective:      Patient ID: Tobias Irwin is a 73 y.o. male.    Benign Prostatic Hypertrophy  This is a chronic problem. The current episode started more than 1 year ago. The problem is unchanged. Irritative symptoms do not include frequency, nocturia or urgency. Obstructive symptoms include a slower stream. Obstructive symptoms do not include incomplete emptying. Pertinent negatives include no chills, dysuria, genital pain, hematuria or hesitancy. AUA score is 0-7. He is not sexually active. Nothing aggravates the symptoms.       The following portions of the patient's history were reviewed and updated as appropriate: allergies, current medications, past family history, past medical history, past social history, past surgical history, and problem list.    Review of Systems   Constitutional:  Negative for chills, diaphoresis, fatigue and fever.   HENT: Negative.     Eyes: Negative.    Respiratory: Negative.     Cardiovascular: Negative.    Gastrointestinal: Negative.    Endocrine: Negative.    Genitourinary:  Negative for dysuria, frequency, hematuria, hesitancy, incomplete emptying, nocturia and urgency.        See HPI   Musculoskeletal: Negative.    Skin: Negative.    Allergic/Immunologic: Negative.    Neurological: Negative.    Hematological: Negative.    Psychiatric/Behavioral: Negative.         AUA SYMPTOM SCORE      Flowsheet Row Most Recent Value   AUA SYMPTOM SCORE    How often have  "you had a sensation of not emptying your bladder completely after you finished urinating? 0 (P)    How often have you had to urinate again less than two hours after you finished urinating? 0 (P)    How often have you found you stopped and started again several times when you urinate? 0 (P)    How often have you found it difficult to postpone urination? 0 (P)    How often have you had a weak urinary stream? 0 (P)    How often have you had to push or strain to begin urination? 0 (P)    How many times did you most typically get up to urinate from the time you went to bed at night until the time you got up in the morning? 0 (P)    Quality of Life: If you were to spend the rest of your life with your urinary condition just the way it is now, how would you feel about that? 1 (P)    AUA SYMPTOM SCORE 0 (P)            Objective:      /80 (BP Location: Left arm, Patient Position: Sitting, Cuff Size: Standard)   Pulse 67   Ht 5' 6\" (1.676 m)   Wt 59.9 kg (132 lb)   SpO2 97%   BMI 21.31 kg/m²          Physical Exam  Vitals reviewed.   Constitutional:       General: He is not in acute distress.     Appearance: Normal appearance. He is well-developed and normal weight. He is not ill-appearing, toxic-appearing or diaphoretic.   HENT:      Head: Normocephalic and atraumatic.   Eyes:      General: No scleral icterus.     Conjunctiva/sclera: Conjunctivae normal.   Cardiovascular:      Rate and Rhythm: Normal rate.   Pulmonary:      Effort: Pulmonary effort is normal.   Abdominal:      General: Bowel sounds are normal. There is no distension.      Palpations: Abdomen is soft. There is no mass.      Tenderness: There is no abdominal tenderness. There is no right CVA tenderness, left CVA tenderness, guarding or rebound.      Hernia: No hernia is present.   Genitourinary:     Penis: Normal. No phimosis or hypospadias.       Testes: Normal.         Right: Mass not present.         Left: Mass not present.      Rectum: Normal. "      Comments: Prostate moderately enlarged and palpably benign.  Musculoskeletal:         General: Normal range of motion.      Cervical back: Normal range of motion and neck supple.   Skin:     General: Skin is warm and dry.   Neurological:      General: No focal deficit present.      Mental Status: He is alert and oriented to person, place, and time.   Psychiatric:         Mood and Affect: Mood normal.         Behavior: Behavior normal.         Thought Content: Thought content normal.         Judgment: Judgment normal.

## 2024-03-19 NOTE — TELEPHONE ENCOUNTER
Patient calling in to obtain some clarification on his AVS sheet.    Answered all questions patient had in regard to upcoming TRUS/Bx.    Patient expressed nervous regarding the outcome, emotional support provided.

## 2024-05-10 ENCOUNTER — PROCEDURE VISIT (OUTPATIENT)
Dept: UROLOGY | Facility: MEDICAL CENTER | Age: 74
End: 2024-05-10
Payer: COMMERCIAL

## 2024-05-10 ENCOUNTER — TELEPHONE (OUTPATIENT)
Age: 74
End: 2024-05-10

## 2024-05-10 ENCOUNTER — HOSPITAL ENCOUNTER (EMERGENCY)
Facility: HOSPITAL | Age: 74
Discharge: HOME | End: 2024-05-10
Attending: EMERGENCY MEDICINE
Payer: COMMERCIAL

## 2024-05-10 VITALS
RESPIRATION RATE: 20 BRPM | OXYGEN SATURATION: 99 % | SYSTOLIC BLOOD PRESSURE: 131 MMHG | HEART RATE: 82 BPM | TEMPERATURE: 98.4 F | DIASTOLIC BLOOD PRESSURE: 71 MMHG

## 2024-05-10 VITALS
WEIGHT: 135.2 LBS | HEART RATE: 61 BPM | HEIGHT: 66 IN | OXYGEN SATURATION: 98 % | BODY MASS INDEX: 21.73 KG/M2 | DIASTOLIC BLOOD PRESSURE: 78 MMHG | SYSTOLIC BLOOD PRESSURE: 130 MMHG

## 2024-05-10 DIAGNOSIS — R97.20 ELEVATED PROSTATE SPECIFIC ANTIGEN (PSA): Primary | ICD-10-CM

## 2024-05-10 DIAGNOSIS — R58 BLOOD ON TOILET PAPER: Primary | ICD-10-CM

## 2024-05-10 DIAGNOSIS — Z98.890 H/O PROSTATE BIOPSY: ICD-10-CM

## 2024-05-10 PROCEDURE — 96372 THER/PROPH/DIAG INJ SC/IM: CPT

## 2024-05-10 PROCEDURE — 99282 EMERGENCY DEPT VISIT SF MDM: CPT | Performed by: EMERGENCY MEDICINE

## 2024-05-10 PROCEDURE — 99282 EMERGENCY DEPT VISIT SF MDM: CPT

## 2024-05-10 PROCEDURE — 88344 IMHCHEM/IMCYTCHM EA MLT ANTB: CPT | Performed by: PATHOLOGY

## 2024-05-10 PROCEDURE — 55700 PR PROSTATE NEEDLE BIOPSY ANY APPROACH: CPT | Performed by: UROLOGY

## 2024-05-10 PROCEDURE — G0416 PROSTATE BIOPSY, ANY MTHD: HCPCS | Performed by: PATHOLOGY

## 2024-05-10 PROCEDURE — 76942 ECHO GUIDE FOR BIOPSY: CPT | Performed by: UROLOGY

## 2024-05-10 RX ORDER — CEFTRIAXONE 1 G/1
1000 INJECTION, POWDER, FOR SOLUTION INTRAMUSCULAR; INTRAVENOUS ONCE
Status: COMPLETED | OUTPATIENT
Start: 2024-05-10 | End: 2024-05-10

## 2024-05-10 RX ADMIN — CEFTRIAXONE 1000 MG: 1 INJECTION, POWDER, FOR SOLUTION INTRAMUSCULAR; INTRAVENOUS at 14:59

## 2024-05-10 NOTE — TELEPHONE ENCOUNTER
Pt calling to speak w/someone about some soreness he is having after proced w/URO. Transferred to Michael for further assistance.

## 2024-05-10 NOTE — PROGRESS NOTES
Biopsy prostate     Date/Time  5/10/2024 1:30 PM     Performed by  Akhil Ruano MD   Authorized by  Akhil Ruano MD     [place] Protocol   Consent: Verbal consent obtained. Written consent obtained.  Risks and benefits: risks, benefits and alternatives were discussed  Consent given by: patient  Patient understanding: patient states understanding of the procedure being performed  Patient consent: the patient's understanding of the procedure matches consent given  Procedure consent: procedure consent matches procedure scheduled  Required items: required blood products, implants, devices, and special equipment available  Patient identity confirmed: verbally with patient      Local anesthesia used: yes     Anesthesia   Local anesthesia used: yes  Local Anesthetic: lidocaine 2% without epinephrine  Anesthetic total: 10 mL     Sedation   Patient sedated: no        Specimen: yes    Culture: no   Procedure Details   Procedure Notes:   The patient was brought to the procedure room and properly identified.  Gentamicin was given intramuscularly as ordered.    The patient was then placed in the left lateral position.  Digital rectal examination was performed. FELIBERTO reveals moderately enlarged benign feeling prostate.  Betadine was instilled into the rectum.  The transrectal ultrasound probe was placed in the rectum.  Transrectal ultrasound of prostate then commenced.      Findings on ultrasound: Normal seminal vesicles.  No hypoechoic lesion in the peripheral zone.  The transition zone is enlarged and heterogeneous in echotexture.  There is a median lobe.  The visualized bladder is unremarkable.  Prostate measures 4 cm (H)x 5.6 cm (W) x 5.4 cm (L) for a volume of 62 g.    After completion  of the ultrasound a prostate block was administered in standard fashion using 2% xylocaine without epinephrine and a spinal needle.    Transrectal ultrasound-guided biopsies were then obtained.  12 biopsies were obtained with  standard template.  Biopsies were directed medially and laterally on both sides of the gland at the base, mid gland and apex.    No abdoul prostatic hematoma was noted.    The procedure was well tolerated.  The patient was given discharge instructions.  He left the procedure room in satisfactory condition.    Patient Transportation: confirmed  Patient tolerance: patient tolerated the procedure well with no immediate complications

## 2024-05-11 NOTE — ED ATTENDING ATTESTATION
5/10/2024  IScotty Jr, DO, saw and evaluated the patient. I have discussed the patient with the resident/non-physician practitioner and agree with the resident's/non-physician practitioner's findings, Plan of Care, and MDM as documented in the resident's/non-physician practitioner's note, except where noted. All available labs and Radiology studies were reviewed.  I was present for key portions of any procedure(s) performed by the resident/non-physician practitioner and I was immediately available to provide assistance.       At this point I agree with the current assessment done in the Emergency Department.  I have conducted an independent evaluation of this patient a history and physical is as follows:    Final Diagnosis:  1. Blood on toilet paper    2. H/O prostate biopsy            MDM       Patient had a rectal prostate biopsy earlier today.  Had a spot of blood on toilet paper that he noticed at around 4 PM.  Patient took a nap and woke up and had no symptoms.  No other gross blood noted.  Patient no other symptoms.  Patient urinated here, no urinary retention noted.  No further treatment or intervention needed at this time.        Lab Results:   Abnormal Labs Reviewed - No data to display  Lab Results: I have personally reviewed pertinent lab results.    Imaging:   No orders to display     I have personally reviewed pertinent reports.    EKG, Pathology, and Other Studies: I have personally reviewed pertinent films in PACS    Clinical Impression:    Final diagnoses:   Blood on toilet paper   H/O prostate biopsy         Disposition    discharged           New Prescriptions:    Discharge Medication List as of 5/10/2024 10:17 PM               Follow-up Instructions:    Jeffry Chandler DO  [address]   [address]  [facility] [address] [phone]          [facility] Emergency Department  [facility] [facility] [address]  [phone]  Go to   If symptoms  worsen, As needed          History of Present Illness   Tobias Irwin is a 73 y.o. male who presents with Post-op Problem (Pt c/o of rectal bleeding after having a prostate biopsy today. Nick red blood noted with no clots. Rey dizziness)    has a past medical history of Abdominal pain, Abscess of right thigh, Anomalies of pupillary function, BPH with obstruction/lower urinary tract symptoms, Closed compression fracture of sacrum ([facility]), Contusion of lung, Current chronic use of steroids, Elevated PSA, Incomplete bladder emptying, Inguinal hernia, Kidney stone, Nephrolithiasis, Nocturia, Pneumothorax, right, Polymyalgia rheumatica ([facility]) (2013), Rheumatic fever, Rib fracture, Right flank pain, Subdural hematoma ([facility]) (02/2010), Vocal cord paralysis, and Weak urinary stream..         Objective     Vitals:    05/10/24 2025 05/10/24 2027   BP:  131/71   BP Location:  Right arm   Pulse: 82    Resp: 20    Temp: 98.4 °F (36.9 °C)    TempSrc: Oral    SpO2: 99%      There is no height or weight on file to calculate BMI.  No intake or output data in the 24 hours ending 05/10/24 0705  Invasive Devices       None                   ED Course         Critical Care Time  Procedures

## 2024-05-11 NOTE — DISCHARGE INSTRUCTIONS
As we discussed, I would expect you to have a little bit of blood after the amount of biopsy that you received today.  I would not be surprised if you had some blood on your first 1 or 2 bowel movements after your biopsy as well.  However if you are having significant amounts of blood especially if you are feeling the toilet with blood, saturating through underwear with blood, or if you are having a lot of pain in between your shoulders or abdominal distention please come back to the emergency department for reevaluation.  Additionally, if you are having difficulty emptying your bladder please come back to the emergency department.

## 2024-05-11 NOTE — ED PROVIDER NOTES
History  Chief Complaint   Patient presents with    Post-op Problem     Pt c/o of rectal bleeding after having a prostate biopsy today. Nick red blood noted with no clots. Rey dizziness     This is a 73-year-old male who is coming in tonight for evaluation of some bright red blood per rectum as well as concerns for urinary hesitancy after a prostate biopsy today.  Patient around 330 this afternoon had prostate biopsy performed transrectally.  Per the op note, I see that the patient had multiple biopsies performed as well as some numbing medication inserted into the prostate.  He has multiple reasons to have some rectal bleeding.  He reports that around 4:00 this afternoon he had some bright red blood on toilet paper when he wiped after using the bathroom.  He notes that this was approximately the size of a nickel to a quarter.  He has not had any repeat episodes of rectal bleeding since that time.  He did go to sleep shortly thereafter and states that when he woke up, he felt like he might need to urinate however nothing came out when he went to the bathroom.  Of note, patient was able to urinate here in the emergency department without much difficulty.  He denies any hematuria, or passage of clots in his urine.  He is denying any suprapubic fullness, abdominal distention, abdominal tenderness, abdominal pain, nausea, vomiting, or pain in between his shoulder blades.  He is not on any anticoagulation or antiplatelet agents.        Prior to Admission Medications   Prescriptions Last Dose Informant Patient Reported? Taking?   Ascorbic Acid (VITAMIN C) 500 MG CAPS  Self Yes No   Sig: Take 1 tablet by mouth daily   Cholecalciferol (VITAMIN D-3) 1000 units CAPS  Self Yes No   Sig: Take by mouth daily   predniSONE 5 mg tablet  Self No No   Sig: Currently 2.5 every other day for PMR; or dose as directed by rheumatology   Patient taking differently: Take 2.5 mg by mouth daily Currently 2.5 every other day for PMR; or  dose as directed by rheumatology   vitamin E, tocopherol, 400 units capsule  Self Yes No   Sig: Take by mouth daily      Facility-Administered Medications Last Administration Doses Remaining   cefTRIAXone (ROCEPHIN) injection 1,000 mg 5/10/2024  2:59 PM 0          Past Medical History:   Diagnosis Date    Abdominal pain     Abscess of right thigh     Last Assessed: 7/29/2016    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    BPH with obstruction/lower urinary tract symptoms     Closed compression fracture of sacrum ([facility])     Contusion of lung     Current chronic use of steroids     off 9/14    Elevated PSA     Incomplete bladder emptying     Inguinal hernia     Last Assessed: 11/7/2014    Kidney stone     Nephrolithiasis     Nocturia     Pneumothorax, right     Last Assessed: 12/30/2016    Polymyalgia rheumatica ([facility]) 2013    Rheumatic fever     on Pcn x yrs    Rib fracture     Right flank pain     Subdural hematoma ([facility]) 02/2010    Vocal cord paralysis     Weak urinary stream        Past Surgical History:   Procedure Laterality Date    COLONOSCOPY  2010    Normal.  Biopsy showed collagenous colitis by     ENDOTRACHEAL INTUBATION EMERGENT      FLEXIBLE SIGMOIDOSCOPY  2012    Normal biopsy showed normal tissue by Dr. Boyd    INGUINAL HERNIA REPAIR Right     THROAT SURGERY      throat Larynx Vocal Cord Mobility - twice    TRACHEOSTOMY      Emergency       Family History   Problem Relation Age of Onset    Diabetes Mother     Lung cancer Mother     Nephrolithiasis Father     Stomach cancer Maternal Grandmother     Cancer Family     Diabetes Family     Urolithiasis Family      I have reviewed and agree with the history as documented.    E-Cigarette/Vaping    E-Cigarette Use Never User      E-Cigarette/Vaping Substances     Social History     Tobacco Use    Smoking status: Never    Smokeless tobacco: Never   Vaping Use    Vaping status: Never Used   Substance Use Topics    Alcohol use: Yes     " Comment: Drinks socially; \"a few beers a week\".    Drug use: No        Review of Systems   Constitutional:  Negative for chills, fatigue and fever.   HENT:  Negative for congestion and sore throat.    Eyes:  Negative for pain and visual disturbance.   Respiratory:  Negative for cough, chest tightness and shortness of breath.    Cardiovascular:  Negative for chest pain and palpitations.   Gastrointestinal:  Negative for abdominal pain, blood in stool, constipation, diarrhea, nausea and vomiting.   Genitourinary:  Positive for difficulty urinating. Negative for dysuria, flank pain and hematuria.        1 episode of rectal bleeding earlier this afternoon   Musculoskeletal:  Negative for arthralgias, back pain and neck pain.   Skin:  Negative for color change and rash.   Neurological:  Negative for dizziness, syncope and light-headedness.   Hematological:  Negative for adenopathy. Does not bruise/bleed easily.   All other systems reviewed and are negative.      Physical Exam  ED Triage Vitals   Temperature Pulse Respirations Blood Pressure SpO2   05/10/24 2025 05/10/24 2025 05/10/24 2025 05/10/24 2027 05/10/24 2025   98.4 °F (36.9 °C) 82 20 131/71 99 %      Temp Source Heart Rate Source Patient Position - Orthostatic VS BP Location FiO2 (%)   05/10/24 2025 05/10/24 2025 05/10/24 2025 05/10/24 2025 --   Oral Monitor Lying Right arm       Pain Score       05/10/24 2025       5             Orthostatic Vital Signs  Vitals:    05/10/24 2025 05/10/24 2027   BP:  131/71   Pulse: 82    Patient Position - Orthostatic VS: Lying Sitting       Physical Exam  Vitals and nursing note reviewed.   Constitutional:       General: He is not in acute distress.     Appearance: He is well-developed and normal weight. He is not toxic-appearing or diaphoretic.   HENT:      Head: Normocephalic and atraumatic.      Right Ear: External ear normal.      Left Ear: External ear normal.      Nose: Nose normal. No congestion or rhinorrhea.      " Mouth/Throat:      Mouth: Mucous membranes are moist.      Pharynx: No oropharyngeal exudate or posterior oropharyngeal erythema.   Eyes:      General: No scleral icterus.     Extraocular Movements: Extraocular movements intact.      Conjunctiva/sclera: Conjunctivae normal.      Pupils: Pupils are equal, round, and reactive to light.   Cardiovascular:      Rate and Rhythm: Normal rate and regular rhythm.      Pulses: Normal pulses.      Heart sounds: No murmur heard.  Pulmonary:      Effort: Pulmonary effort is normal. No respiratory distress.      Breath sounds: Normal breath sounds. No wheezing or rales.   Abdominal:      Palpations: Abdomen is soft. There is no mass.      Tenderness: There is no abdominal tenderness. There is no right CVA tenderness, left CVA tenderness or guarding.      Hernia: No hernia is present.   Genitourinary:     Rectum: No anal fissure or external hemorrhoid. Normal anal tone.      Comments: Performed external examination without any signs of rectal bleeding, no dried blood noted.  Musculoskeletal:         General: No swelling. Normal range of motion.      Cervical back: Normal range of motion and neck supple.      Right lower leg: No edema.      Left lower leg: No edema.   Skin:     General: Skin is warm and dry.      Capillary Refill: Capillary refill takes less than 2 seconds.   Neurological:      General: No focal deficit present.      Mental Status: He is alert and oriented to person, place, and time.   Psychiatric:         Mood and Affect: Mood normal.         Behavior: Behavior normal.         ED Medications  Medications - No data to display    Diagnostic Studies  Results Reviewed       None                   No orders to display         Procedures  Procedures      ED Course                             SBIRT 22yo+      Flowsheet Row Most Recent Value   Initial Alcohol Screen: US AUDIT-C     1. How often do you have a drink containing alcohol? 0 Filed at: 05/10/2024 2027   2. How  many drinks containing alcohol do you have on a typical day you are drinking?  0 Filed at: 05/10/2024 2027   3a. Male UNDER 65: How often do you have five or more drinks on one occasion? 0 Filed at: 05/10/2024 2027   3b. FEMALE Any Age, or MALE 65+: How often do you have 4 or more drinks on one occassion? 0 Filed at: 05/10/2024 2027   Audit-C Score 0 Filed at: 05/10/2024 2027   STACEY: How many times in the past year have you...    Used an illegal drug or used a prescription medication for non-medical reasons? Never Filed at: 05/10/2024 2027                  Medical Decision Making  Medical complexity: Patient presents after scant rectal bleeding after prostate biopsy this afternoon.  This does not seem to be an unexpected complication from this type of procedure, and patient is not anticoagulated, not exhibiting any signs of anemia, has not having any tachycardia or shortness of breath symptoms, is not lightheaded, and does not have any ongoing bleeding.  At this time no indication for further lab work or invasive testing.  Patient's urinary hesitancy that he was experiencing shortly after the procedure seems to have resolved as he had a normal bladder scan after micturition here in the emergency department.  There is no sign of blood or clots in his urine at this time.  Therefore at this time I see no need for further medical intervention.  Patient will watch his symptoms closely at home and come back if there is significant amount of rectal bleeding, or if he is having inability to void, pain in between his shoulder blades, pain in his chest, or shortness of breath.  Otherwise he will follow-up as scheduled with his urology/surgery team, as well as with his primary doctor.    Patient was discharged with stable examination, stable vital signs, no further episodes of rectal bleeding, and voiding spontaneously without retention.          Disposition  Final diagnoses:   Blood on toilet paper   H/O prostate biopsy      Time reflects when diagnosis was documented in both MDM as applicable and the Disposition within this note       Time User Action Codes Description Comment    5/10/2024 10:03 PM Gary Camp [[id number]] Blood on toilet paper     5/10/2024 10:03 PM Gary Camp [Z98.890] H/O prostate biopsy           ED Disposition       ED Disposition   Discharge    Condition   Stable    Date/Time   Fri May 10, 2024 2202    Comment   Tobias Irwin discharge to home/self care.                   Follow-up Information       Follow up With Specialties Details Why Contact Info Additional Information    Jeffry Chandelr,  Family Medicine   [address]  [address]  [facility] [address] [phone]       [facility] Emergency Department Emergency Medicine Go to  If symptoms worsen, As needed [address] [facility] [address]  [phone] [facility] Emergency Department, [address], [place], [address]            Discharge Medication List as of 5/10/2024 10:17 PM        CONTINUE these medications which have NOT CHANGED    Details   Ascorbic Acid (VITAMIN C) 500 MG CAPS Take 1 tablet by mouth daily, Historical Med      Cholecalciferol (VITAMIN D-3) 1000 units CAPS Take by mouth daily, Historical Med      predniSONE 5 mg tablet Currently 2.5 every other day for PMR; or dose as directed by rheumatology, No Print      vitamin E, tocopherol, 400 units capsule Take by mouth daily, Historical Med           No discharge procedures on file.    PDMP Review         Value Time User    PDMP Reviewed  Yes 11/4/2021  2:00 AM ANITRA [place]             ED Provider  Attending physically available and evaluated Tobias Irwin. I managed the patient along with the ED Attending.    Electronically Signed by           Gary Camp MD  05/11/24 0108

## 2024-05-13 ENCOUNTER — VBI (OUTPATIENT)
Dept: FAMILY MEDICINE CLINIC | Facility: CLINIC | Age: 74
End: 2024-05-13

## 2024-05-13 NOTE — TELEPHONE ENCOUNTER
05/13/24 10:34 AM    Patient contacted post ED visit, [facility] department spoke with patient/caregiver and outreach was successful.    Thank you.  Cristiane Vargas  [facility]

## 2024-05-14 PROCEDURE — 88344 IMHCHEM/IMCYTCHM EA MLT ANTB: CPT | Performed by: PATHOLOGY

## 2024-05-14 PROCEDURE — G0416 PROSTATE BIOPSY, ANY MTHD: HCPCS | Performed by: PATHOLOGY

## 2024-05-24 ENCOUNTER — OFFICE VISIT (OUTPATIENT)
Dept: UROLOGY | Facility: MEDICAL CENTER | Age: 74
End: 2024-05-24
Payer: COMMERCIAL

## 2024-05-24 VITALS
HEART RATE: 75 BPM | SYSTOLIC BLOOD PRESSURE: 130 MMHG | BODY MASS INDEX: 21.21 KG/M2 | OXYGEN SATURATION: 98 % | DIASTOLIC BLOOD PRESSURE: 80 MMHG | HEIGHT: 66 IN | WEIGHT: 132 LBS

## 2024-05-24 DIAGNOSIS — N40.1 BPH WITH URINARY OBSTRUCTION: ICD-10-CM

## 2024-05-24 DIAGNOSIS — N13.8 BPH WITH URINARY OBSTRUCTION: ICD-10-CM

## 2024-05-24 DIAGNOSIS — R97.20 ELEVATED PROSTATE SPECIFIC ANTIGEN (PSA): Primary | ICD-10-CM

## 2024-05-24 PROCEDURE — 99213 OFFICE O/P EST LOW 20 MIN: CPT | Performed by: UROLOGY

## 2024-05-24 NOTE — ASSESSMENT & PLAN NOTE
Biopsies were all benign.  Acute inflammation was noted.  This is asymptomatic.  Multiparametric MRI did not reveal any lesion and was felt to represent a PI-RADS 2 gland.  The patient was reassured.  He understands no test is perfect and that follow-up of his PSA is still warranted.  I recommended we recheck a free to total PSA in 1 year.

## 2024-05-24 NOTE — PROGRESS NOTES
Ambulatory Visit  Name: Tobias Irwin      : 1950      MRN: [id number]  Encounter Provider: Akhil Ruano MD  Encounter Date: 2024   Encounter department: [facility] UROLOGY [place]    Assessment & Plan   1. Elevated prostate specific antigen (PSA)  Assessment & Plan:  Biopsies were all benign.  Acute inflammation was noted.  This is asymptomatic.  Multiparametric MRI did not reveal any lesion and was felt to represent a PI-RADS 2 gland.  The patient was reassured.  He understands no test is perfect and that follow-up of his PSA is still warranted.  I recommended we recheck a free to total PSA in 1 year.  Orders:  -     PSA, total and free; Future; Expected date: 2025  2. BPH with urinary obstruction  Assessment & Plan:  AUA symptom score is 0.  He is satisfied with his voiding pattern.  We will plan to follow with watchful waiting.  He will return in 1 year and we will recheck urinalysis prior to that visit.  Orders:  -     Urinalysis with microscopic; Future; Expected date: 2025      History of Present Illness     Tobias Irwin is a 73 y.o. male who presents post biopsy of the prostate for an elevated PSA of 8.34 (2024).  Multiparametric MRI revealed a 53 g prostate and was felt to represent a PI-RADS 2 gland.  Transrectal ultrasound of the prostate with biopsy was performed on May 10.  The procedure was well-tolerated there were no intraoperative complications however he did have rectal bleeding and went to the emergency room postoperatively.  This resolved on its own.  He is presently voiding well and denies rectal bleeding.  There is no gross hematuria or symptoms of infection.  He has no fever.  He returns today to review pathology.    Review of Systems   Constitutional:  Negative for chills, diaphoresis, fatigue and fever.   HENT: Negative.     Eyes: Negative.    Respiratory: Negative.     Cardiovascular: Negative.    Endocrine: Negative.    Genitourinary:      "    See HPI   Musculoskeletal: Negative.    Skin: Negative.    Allergic/Immunologic: Negative.    Neurological: Negative.    Hematological: Negative.    Psychiatric/Behavioral: Negative.         AUA SYMPTOM SCORE      Flowsheet Row Most Recent Value   AUA SYMPTOM SCORE    How often have you had a sensation of not emptying your bladder completely after you finished urinating? 0   How often have you had to urinate again less than two hours after you finished urinating? 0   How often have you found you stopped and started again several times when you urinate? 0   How often have you found it difficult to postpone urination? 0   How often have you had a weak urinary stream? 0   How often have you had to push or strain to begin urination? 0   How many times did you most typically get up to urinate from the time you went to bed at night until the time you got up in the morning? 0   Quality of Life: If you were to spend the rest of your life with your urinary condition just the way it is now, how would you feel about that? 2   AUA SYMPTOM SCORE 0          Objective     /80   Pulse 75   Ht 5' 6\" (1.676 m)   Wt 59.9 kg (132 lb)   SpO2 98%   BMI 21.31 kg/m²   Physical Exam  Vitals reviewed.   Constitutional:       General: He is not in acute distress.     Appearance: Normal appearance. He is well-developed and normal weight. He is not ill-appearing, toxic-appearing or diaphoretic.   HENT:      Head: Normocephalic and atraumatic.   Eyes:      General: No scleral icterus.     Conjunctiva/sclera: Conjunctivae normal.   Cardiovascular:      Rate and Rhythm: Normal rate.   Pulmonary:      Effort: Pulmonary effort is normal.   Abdominal:      General: Abdomen is flat. There is no distension.      Palpations: There is no mass.      Tenderness: There is no abdominal tenderness. There is no right CVA tenderness, left CVA tenderness, guarding or rebound.      Hernia: No hernia is present.   Genitourinary:     Penis: Normal.  "      Testes: Normal.   Musculoskeletal:      Cervical back: Neck supple.   Skin:     General: Skin is warm and dry.   Neurological:      General: No focal deficit present.      Mental Status: He is alert and oriented to person, place, and time.   Psychiatric:         Mood and Affect: Mood normal.         Behavior: Behavior normal.         Thought Content: Thought content normal.         Judgment: Judgment normal.       Results  Lab Results   Component Value Date    PSA 8.34 (H) 03/05/2024    PSA 6.2 (H) 02/17/2023    PSA 5.9 (H) 12/28/2022     Lab Results   Component Value Date    GLUCOSE 114 10/13/2014    CALCIUM 9.3 12/28/2022     10/13/2014    K 4.2 12/28/2022    CO2 28 12/28/2022     12/28/2022    BUN 19 12/28/2022    CREATININE 0.96 12/28/2022     Lab Results   Component Value Date    WBC 7.30 05/24/2022    HGB 14.6 05/24/2022    HCT 45.6 05/24/2022    MCV 88 05/24/2022     05/24/2022       Office Urine Dip  No results found for this or any previous visit (from the past 1 hour(s)).]    Administrative Statements

## 2024-05-24 NOTE — ASSESSMENT & PLAN NOTE
AUA symptom score is 0.  He is satisfied with his voiding pattern.  We will plan to follow with watchful waiting.  He will return in 1 year and we will recheck urinalysis prior to that visit.

## 2024-05-28 ENCOUNTER — OFFICE VISIT (OUTPATIENT)
Dept: FAMILY MEDICINE CLINIC | Facility: CLINIC | Age: 74
End: 2024-05-28
Payer: COMMERCIAL

## 2024-05-28 VITALS
WEIGHT: 133.2 LBS | OXYGEN SATURATION: 98 % | RESPIRATION RATE: 16 BRPM | DIASTOLIC BLOOD PRESSURE: 74 MMHG | TEMPERATURE: 97 F | BODY MASS INDEX: 21.5 KG/M2 | HEART RATE: 65 BPM | SYSTOLIC BLOOD PRESSURE: 110 MMHG

## 2024-05-28 DIAGNOSIS — R97.20 ELEVATED PROSTATE SPECIFIC ANTIGEN (PSA): ICD-10-CM

## 2024-05-28 DIAGNOSIS — J38.00 VOCAL CORD PARALYSIS: ICD-10-CM

## 2024-05-28 DIAGNOSIS — M35.3 PMR (POLYMYALGIA RHEUMATICA) (HCC): ICD-10-CM

## 2024-05-28 DIAGNOSIS — Z00.00 MEDICARE ANNUAL WELLNESS VISIT, SUBSEQUENT: Primary | ICD-10-CM

## 2024-05-28 DIAGNOSIS — Z79.52 CURRENT CHRONIC USE OF SYSTEMIC STEROIDS: ICD-10-CM

## 2024-05-28 DIAGNOSIS — E78.2 MIXED HYPERLIPIDEMIA: ICD-10-CM

## 2024-05-28 PROBLEM — M25.559 HIP PAIN: Status: RESOLVED | Noted: 2018-09-15 | Resolved: 2024-05-28

## 2024-05-28 PROBLEM — Z87.19 HISTORY OF PANCREATITIS: Status: RESOLVED | Noted: 2021-11-04 | Resolved: 2024-05-28

## 2024-05-28 PROBLEM — F41.1 GENERALIZED ANXIETY DISORDER: Status: RESOLVED | Noted: 2019-09-03 | Resolved: 2024-05-28

## 2024-05-28 PROCEDURE — 99213 OFFICE O/P EST LOW 20 MIN: CPT | Performed by: FAMILY MEDICINE

## 2024-05-28 PROCEDURE — G0439 PPPS, SUBSEQ VISIT: HCPCS | Performed by: FAMILY MEDICINE

## 2024-05-28 NOTE — ASSESSMENT & PLAN NOTE
Last cholesterol was 215 with HDL 52, , declines adding a statin, does have 25% 10-year cardiovascular risk.  He does exercise routinely, watches healthy diet, blood pressure under control, is not diabetic, does not smoke

## 2024-05-28 NOTE — ASSESSMENT & PLAN NOTE
Long-term low-dose prednisone regarding history of PMR, sees [facility] rheumatology yearly, saw them recently.  Continues on prednisone 2.5 mg daily, he will be doing DEXA scan in August, await redo blood work, watch glucose on prednisone
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
## 2024-05-28 NOTE — ASSESSMENT & PLAN NOTE
Had negative biopsy of prostate, had some bleeding afterwards and was seen at the ER, that resolved.  He will redo PSA yearly with urology, saw them in May 2024

## 2024-05-28 NOTE — PROGRESS NOTES
Jeffry Chandler D.O.  [facility]  [facility]  [address]  [address]  [place], [address]    MEDICARE SUBSEQUENT VISIT NOTE       NAME: Tobias Irwin  AGE: 73 y.o. SEX: male  : 1950   MRN: [id number]    DATE: 2024  TIME: 12:16 PM    Assessment and Plan         Medicare Wellness Counseling/ Discussion      Patient Instructions   Reviewed health history along with medications.  He continues to do very well, continues to exercise routinely, uses rowing machine 3 times weekly, plans to increase his cycling.    1. Medicare annual wellness visit, subsequent  2. Mixed hyperlipidemia  Assessment & Plan:  Last cholesterol was 215 with HDL 52, , declines adding a statin, does have 25% 10-year cardiovascular risk.  He does exercise routinely, watches healthy diet, blood pressure under control, is not diabetic, does not smoke  Orders:  -     Lipid panel  -     Comprehensive metabolic panel  3. PMR (polymyalgia rheumatica) (HCC)  Assessment & Plan:  Onset around , long-term low-dose prednisone- saw KAYKAY Romero  Orders:  -     CBC  4. Vocal cord paralysis  Assessment & Plan:  Stable  5. Current chronic use of systemic steroids  Assessment & Plan:  Long-term low-dose prednisone regarding history of PMR, sees [facility] rheumatology yearly, saw them recently.  Continues on prednisone 2.5 mg daily, he will be doing DEXA scan in August, await redo blood work, watch glucose on prednisone  Orders:  -     CBC  -     Comprehensive metabolic panel  6. Elevated prostate specific antigen (PSA) w/ NEG BX  Assessment & Plan:  Had negative biopsy of prostate, had some bleeding afterwards and was seen at the ER, that resolved.  He will redo PSA yearly with urology, saw them in May 2024      I would like him to do fasting lipids, CBC, CMP.        Immunization History   Administered Date(s) Administered    INFLUENZA 2018    Influenza, high dose seasonal 0.7 mL  "09/20/2018, 10/07/2019    Pneumococcal Conjugate 13-Valent 01/16/2017    Pneumococcal Polysaccharide PPV23 09/20/2018    Zoster 06/09/2005       Discussed Vaccines,    Pneumococcal vax is up to date  He does not do yearly Flu shot.  He felt he reacted in the past.  Tdap/tetanus shot is due, can be done at pharmacy  (done every 10 yrs for superficial cuts, every 5 yrs for deep wounds)  Can also do 'shingles' shot, Shingrix at pharmacy.  Covid vaccine declined    Non-smoker    Regarding Colon Cancer screening, he had a negative colonoscopy in 2010, GI had given him slip to do redo screening with Cologuard, does have box at home.     We discussed end of life planning, does have a  \"LIVING WILL\"     Regarding Hepatitis C Screening,   previously perfomed and was negative    Glaucoma screening is up-to-date. Sees Derm at times    Continue routine exercise, healthy diet.    We will see him again in 12 months, sooner as needed.    Advance Directives   What are advance directives?  Advance directives are legal documents that state your wishes and plans for medical care. These plans are made ahead of time in case you lose your ability to make decisions for yourself. Advance directives can apply to any medical decision, such as the treatments you want, and if you want to donate organs.   What are the types of advance directives?  There are many types of advance directives, and each state has rules about how to use them. You may choose a combination of any of the following:  Living will:  This is a written record of the treatment you want. You can also choose which treatments you do not want, which to limit, and which to stop at a certain time. This includes surgery, medicine, IV fluid, and tube feedings.   Durable power of  for healthcare (DPAHC):  This is a written record that states who you want to make healthcare choices for you when you are unable to make them for yourself. This person, called a proxy, is usually a " family member or a friend. You may choose more than 1 proxy.  Do not resuscitate (DNR) order:  A DNR order is used in case your heart stops beating or you stop breathing. It is a request not to have certain forms of treatment, such as CPR. A DNR order may be included in other types of advance directives.  Medical directive:  This covers the care that you want if you are in a coma, near death, or unable to make decisions for yourself. You can list the treatments you want for each condition. Treatment may include pain medicine, surgery, blood transfusions, dialysis, IV or tube feedings, and a ventilator (breathing machine).  Values history:  This document has questions about your views, beliefs, and how you feel and think about life. This information can help others choose the care that you would choose.  Why are advance directives important?  An advance directive helps you control your care. Although spoken wishes may be used, it is better to have your wishes written down. Spoken wishes can be misunderstood, or not followed. Treatments may be given even if you do not want them. An advance directive may make it easier for your family to make difficult choices about your care.                   Chief Complaint     Chief Complaint   Patient presents with    Medicare Wellness Visit    Follow-up       History of Present Illness     Tobias Irwin is a 73 y.o.-year-old male who is in today for a routine annual wellness, he has been feeling well overall, did undergo workup with urology for elevated PSA, fortunately biopsy was negative for cancer.    He continues to exercise routinely without issue, uses rowing machine 3 times weekly, cycles.    Does continue to see rheumatology at least yearly, continues on low-dose prednisone regarding history PMR    Review of Systems     Review of Systems   Constitutional:  Negative for appetite change, fatigue, fever and unexpected weight change (down 10 lbs x 1 yr -  good appetite).    HENT:  Negative for sore throat, trouble swallowing and voice change (chronic).    Respiratory:  Negative for cough, chest tightness and shortness of breath.    Cardiovascular:  Negative for chest pain, palpitations and leg swelling.   Gastrointestinal:  Negative for abdominal pain, blood in stool, nausea and vomiting.        No acid reflux     No change in bowel   Genitourinary:  Negative for dysuria and hematuria.   Neurological:  Negative for dizziness, syncope, light-headedness and headaches.   Psychiatric/Behavioral:  Negative for behavioral problems, confusion and sleep disturbance. The patient is not nervous/anxious.        Active Problem List     Patient Active Problem List   Diagnosis    Arthralgia of multiple joints    BPH with urinary obstruction    DDD (degenerative disc disease), cervical    Elevated prostate specific antigen (PSA)    Mixed hyperlipidemia    History of subarachnoid hemorrhage 2010    PMR (polymyalgia rheumatica) ([facility])    Bunion    Crystal arthropathy of ankle and foot    Osteoarthritis of knee    Shoulder joint pain    Disability of walking    Kidney stone    Vocal cord paralysis    Current chronic use of systemic steroids    History of rheumatic fever as a child       Past Medical History:  Past Medical History:   Diagnosis Date    Abdominal pain     Abscess of right thigh     Last Assessed: 7/29/2016    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    BPH with obstruction/lower urinary tract symptoms     Closed compression fracture of sacrum ([facility])     Contusion of lung     Current chronic use of steroids     off 9/14    Elevated PSA     Generalized anxiety disorder 09/03/2019    Hip pain 09/15/2018    History of pancreatitis 11/04/2021    Incomplete bladder emptying     Inguinal hernia     Last Assessed: 11/7/2014    Kidney stone     Nephrolithiasis     Nocturia     Pneumothorax, right     Last Assessed: 12/30/2016    Polymyalgia rheumatica ([facility]) 2013    Rheumatic  "fever     on Pcn x yrs    Rib fracture     Right flank pain     Subdural hematoma (HCC) 02/2010    Vocal cord paralysis     Weak urinary stream        Past Surgical History:  Past Surgical History:   Procedure Laterality Date    COLONOSCOPY  2010    Normal.  Biopsy showed collagenous colitis by     ENDOTRACHEAL INTUBATION EMERGENT      FLEXIBLE SIGMOIDOSCOPY  2012    Normal biopsy showed normal tissue by Dr. Boyd    INGUINAL HERNIA REPAIR Right     THROAT SURGERY      throat Larynx Vocal Cord Mobility - twice    TRACHEOSTOMY      Emergency       Family History:  Family History   Problem Relation Age of Onset    Diabetes Mother     Lung cancer Mother     Nephrolithiasis Father     Stomach cancer Maternal Grandmother     Cancer Family     Diabetes Family     Urolithiasis Family        Social History:  Social History     Tobacco Use    Smoking status: Never    Smokeless tobacco: Never   Substance Use Topics    Alcohol use: Yes     Comment: Drinks socially; \"a few beers a week\".     Social Determinants of Health     Tobacco Use: Low Risk  (5/28/2024)    Patient History     Smoking Tobacco Use: Never     Smokeless Tobacco Use: Never     Passive Exposure: Not on file   Alcohol Use: Not At Risk (5/28/2024)    AUDIT-C     Frequency of Alcohol Consumption: 2-4 times a month     Average Number of Drinks: 1 or 2     Frequency of Binge Drinking: Never   Financial Resource Strain: Not on file   Food Insecurity: No Food Insecurity (5/28/2024)    Hunger Vital Sign     Worried About Running Out of Food in the Last Year: Never true     Ran Out of Food in the Last Year: Never true   Transportation Needs: No Transportation Needs (5/28/2024)    PRAPARE - Transportation     Lack of Transportation (Medical): No     Lack of Transportation (Non-Medical): No   Physical Activity: Not on file   Stress: Not on file   Social Connections: Not on file   Intimate Partner Violence: Not on file   Depression: Not at risk (5/28/2024)    " PHQ-2     PHQ-2 Score: 0   Housing Stability: Low Risk  (5/28/2024)    Housing Stability Vital Sign     Unable to Pay for Housing in the Last Year: No     Number of Times Moved in the Last Year: 1     Homeless in the Last Year: No   Utilities: Not At Risk (5/28/2024)    [facility] Utilities     Threatened with loss of utilities: No   Health Literacy: Not on file        Objective     Vitals:    05/28/24 1011   BP: 110/74   BP Location: Left arm   Patient Position: Sitting   Cuff Size: Standard   Pulse: 65   Resp: 16   Temp: (!) 97 °F (36.1 °C)   TempSrc: Tympanic   SpO2: 98%   Weight: 60.4 kg (133 lb 3.2 oz)     Body mass index is 21.5 kg/m².    BP Readings from Last 3 Encounters:   05/28/24 110/74   05/24/24 130/80   05/10/24 131/71       Wt Readings from Last 3 Encounters:   05/28/24 60.4 kg (133 lb 3.2 oz)   05/24/24 59.9 kg (132 lb)   05/10/24 61.3 kg (135 lb 3.2 oz)       Physical Exam  Constitutional:       General: He is not in acute distress.     Appearance: Normal appearance. He is well-developed. He is not ill-appearing.   HENT:      Right Ear: Tympanic membrane normal.      Left Ear: Tympanic membrane normal.      Mouth/Throat:      Pharynx: Oropharynx is clear.   Eyes:      General: No scleral icterus.  Neck:      Vascular: No carotid bruit.   Cardiovascular:      Rate and Rhythm: Normal rate and regular rhythm.      Heart sounds: Normal heart sounds. No murmur heard.  Pulmonary:      Effort: Pulmonary effort is normal.      Comments: With his chronic vocal cord dysfunction he does have some upper respiratory rhonchi, no wheezing, no rales, no shortness of breath.  Abdominal:      Palpations: Abdomen is soft.      Tenderness: There is no abdominal tenderness.   Musculoskeletal:      Right lower leg: No edema.      Left lower leg: No edema.   Lymphadenopathy:      Cervical: No cervical adenopathy.   Skin:     Coloration: Skin is not jaundiced.   Neurological:      Mental Status: He is alert and oriented to  person, place, and time. Mental status is at baseline.   Psychiatric:         Mood and Affect: Mood normal.         Behavior: Behavior normal.         ALLERGIES:  Allergies   Allergen Reactions    Influenza Vaccines Dizziness    Zoloft [Sertraline]        Current Medications     Current Outpatient Medications   Medication Sig Dispense Refill    Ascorbic Acid (VITAMIN C) 500 MG CAPS Take 1 tablet by mouth daily      Cholecalciferol (VITAMIN D-3) 1000 units CAPS Take by mouth daily      predniSONE 5 mg tablet Currently 2.5 every other day for PMR; or dose as directed by rheumatology (Patient taking differently: Take 2.5 mg by mouth daily Currently 2.5 every other day for PMR; or dose as directed by rheumatology) 30 tablet 0    vitamin E, tocopherol, 400 units capsule Take by mouth daily       No current facility-administered medications for this visit.             Annual Wellness Visit Questionnaire   Tobias is here for his Subsequent Wellness visit.     Health Risk Assessment:   Patient rates overall health as very good. Patient feels that their physical health rating is same. Patient is satisfied with their life. Eyesight was rated as same. Hearing was rated as same. Patient feels that their emotional and mental health rating is same. Patients states they are sometimes angry. Patient states they are never, rarely unusually tired/fatigued. Pain experienced in the last 7 days has been none. Patient states that he has experienced no weight loss or gain in last 6 months.     Depression Screening:   PHQ-2 Score: 0  PHQ-9 Score: 0      Fall Risk Screening:   In the past year, patient has experienced: no history of falling in past year      Home Safety:  Patient does not have trouble with stairs inside or outside of their home. Patient has working smoke alarms and has working carbon monoxide detector. Home safety hazards include: none.     Nutrition:   Current diet is Regular.     Medications:   Patient is currently taking  over-the-counter supplements. OTC medications include: see medication list. Patient is able to manage medications.     Activities of Daily Living (ADLs)/Instrumental Activities of Daily Living (IADLs):   Walk and transfer into and out of bed and chair?: Yes  Dress and groom yourself?: Yes    Bathe or shower yourself?: Yes    Feed yourself? Yes  Do your laundry/housekeeping?: Yes  Manage your money, pay your bills and track your expenses?: Yes  Make your own meals?: Yes    Do your own shopping?: Yes    Previous Hospitalizations:   Any hospitalizations or ED visits within the last 12 months?: Yes    How many hospitalizations have you had in the last year?: 1-2    Advance Care Planning:   Living will: No    Durable POA for healthcare: No    Advanced directive: No    ACP document given: Yes      PREVENTIVE SCREENINGS      Cardiovascular Screening:    General: Screening Not Indicated and History Lipid Disorder      Diabetes Screening:     General: Screening Current      Prostate Cancer Screening:    General: Screening Current      Osteoporosis Screening:    General: Screening Current      Abdominal Aortic Aneurysm (AAA) Screening:    Risk factors include: age between 65-76 yo        Lung Cancer Screening:     General: Screening Not Indicated      Hepatitis C Screening:    General: Screening Current    Screening, Brief Intervention, and Referral to Treatment (SBIRT)    Screening  Typical number of drinks in a day: 0  Typical number of drinks in a week: 1  Interpretation: Low risk drinking behavior.    AUDIT-C Screenin) How often did you have a drink containing alcohol in the past year? 2 to 4 times a month  2) How many drinks did you have on a typical day when you were drinking in the past year? 1 to 2  3) How often did you have 6 or more drinks on one occasion in the past year? never    AUDIT-C Score: 2  Interpretation: Score 0-3 (male): Negative screen for alcohol misuse    Social Determinants of Health     Food  Insecurity: No Food Insecurity (5/28/2024)    Hunger Vital Sign     Worried About Running Out of Food in the Last Year: Never true     Ran Out of Food in the Last Year: Never true   Transportation Needs: No Transportation Needs (5/28/2024)    PRAPARE - Transportation     Lack of Transportation (Medical): No     Lack of Transportation (Non-Medical): No   Housing Stability: Low Risk  (5/28/2024)    Housing Stability Vital Sign     Unable to Pay for Housing in the Last Year: No     Number of Times Moved in the Last Year: 1     Homeless in the Last Year: No   Utilities: Not At Risk (5/28/2024)    [facility] Utilities     Threatened with loss of utilities: No     Jeffry Chandler DO

## 2024-05-28 NOTE — PATIENT INSTRUCTIONS
Reviewed health history along with medications.  He continues to do very well, continues to exercise routinely, uses rowing machine 3 times weekly, plans to increase his cycling.    1. Medicare annual wellness visit, subsequent  2. Mixed hyperlipidemia  Assessment & Plan:  Last cholesterol was 215 with HDL 52, , declines adding a statin, does have 25% 10-year cardiovascular risk.  He does exercise routinely, watches healthy diet, blood pressure under control, is not diabetic, does not smoke  Orders:  -     Lipid panel  -     Comprehensive metabolic panel  3. PMR (polymyalgia rheumatica) (HCC)  Assessment & Plan:  Onset around 2016, long-term low-dose prednisone- saw KAYKAY Romero  Orders:  -     CBC  4. Vocal cord paralysis  Assessment & Plan:  Stable  5. Current chronic use of systemic steroids  Assessment & Plan:  Long-term low-dose prednisone regarding history of PMR, sees [facility] rheumatology yearly, saw them recently.  Continues on prednisone 2.5 mg daily, he will be doing DEXA scan in August, await redo blood work, watch glucose on prednisone  Orders:  -     CBC  -     Comprehensive metabolic panel  6. Elevated prostate specific antigen (PSA) w/ NEG BX  Assessment & Plan:  Had negative biopsy of prostate, had some bleeding afterwards and was seen at the ER, that resolved.  He will redo PSA yearly with urology, saw them in May 2024      I would like him to do fasting lipids, CBC, CMP.        Immunization History   Administered Date(s) Administered    INFLUENZA 09/20/2018    Influenza, high dose seasonal 0.7 mL 09/20/2018, 10/07/2019    Pneumococcal Conjugate 13-Valent 01/16/2017    Pneumococcal Polysaccharide PPV23 09/20/2018    Zoster 06/09/2005       Discussed Vaccines,    Pneumococcal vax is up to date  He does not do yearly Flu shot.  He felt he reacted in the past.  Tdap/tetanus shot is due, can be done at pharmacy  (done every 10 yrs for superficial cuts, every 5 yrs for deep wounds)  Can also do  "'shingles' shot, Shingrix at pharmacy.  Covid vaccine declined    Non-smoker    Regarding Colon Cancer screening, he had a negative colonoscopy in 2010, GI had given him slip to do redo screening with Cologuard, does have box at home.     We discussed end of life planning, does have a  \"LIVING WILL\"     Regarding Hepatitis C Screening,   previously perfomed and was negative    Glaucoma screening is up-to-date. Sees Derm at times    Continue routine exercise, healthy diet.    We will see him again in 12 months, sooner as needed.    Advance Directives   What are advance directives?  Advance directives are legal documents that state your wishes and plans for medical care. These plans are made ahead of time in case you lose your ability to make decisions for yourself. Advance directives can apply to any medical decision, such as the treatments you want, and if you want to donate organs.   What are the types of advance directives?  There are many types of advance directives, and each state has rules about how to use them. You may choose a combination of any of the following:  Living will:  This is a written record of the treatment you want. You can also choose which treatments you do not want, which to limit, and which to stop at a certain time. This includes surgery, medicine, IV fluid, and tube feedings.   Durable power of  for healthcare (DPAHC):  This is a written record that states who you want to make healthcare choices for you when you are unable to make them for yourself. This person, called a proxy, is usually a family member or a friend. You may choose more than 1 proxy.  Do not resuscitate (DNR) order:  A DNR order is used in case your heart stops beating or you stop breathing. It is a request not to have certain forms of treatment, such as CPR. A DNR order may be included in other types of advance directives.  Medical directive:  This covers the care that you want if you are in a coma, near death, or " unable to make decisions for yourself. You can list the treatments you want for each condition. Treatment may include pain medicine, surgery, blood transfusions, dialysis, IV or tube feedings, and a ventilator (breathing machine).  Values history:  This document has questions about your views, beliefs, and how you feel and think about life. This information can help others choose the care that you would choose.  Why are advance directives important?  An advance directive helps you control your care. Although spoken wishes may be used, it is better to have your wishes written down. Spoken wishes can be misunderstood, or not followed. Treatments may be given even if you do not want them. An advance directive may make it easier for your family to make difficult choices about your care.

## 2024-05-29 ENCOUNTER — APPOINTMENT (OUTPATIENT)
Dept: LAB | Facility: MEDICAL CENTER | Age: 74
End: 2024-05-29
Payer: COMMERCIAL

## 2024-05-29 LAB
ALBUMIN SERPL BCP-MCNC: 4.1 G/DL (ref 3.5–5)
ALP SERPL-CCNC: 43 U/L (ref 34–104)
ALT SERPL W P-5'-P-CCNC: 12 U/L (ref 7–52)
ANION GAP SERPL CALCULATED.3IONS-SCNC: 5 MMOL/L (ref 4–13)
AST SERPL W P-5'-P-CCNC: 15 U/L (ref 13–39)
BILIRUB SERPL-MCNC: 0.48 MG/DL (ref 0.2–1)
BUN SERPL-MCNC: 18 MG/DL (ref 5–25)
CALCIUM SERPL-MCNC: 9.1 MG/DL (ref 8.4–10.2)
CHLORIDE SERPL-SCNC: 108 MMOL/L (ref 96–108)
CHOLEST SERPL-MCNC: 231 MG/DL
CO2 SERPL-SCNC: 30 MMOL/L (ref 21–32)
CREAT SERPL-MCNC: 0.8 MG/DL (ref 0.6–1.3)
ERYTHROCYTE [DISTWIDTH] IN BLOOD BY AUTOMATED COUNT: 16.4 % (ref 11.6–15.1)
GFR SERPL CREATININE-BSD FRML MDRD: 87 ML/MIN/1.73SQ M
GLUCOSE P FAST SERPL-MCNC: 93 MG/DL (ref 65–99)
HCT VFR BLD AUTO: 42.8 % (ref 36.5–49.3)
HDLC SERPL-MCNC: 57 MG/DL
HGB BLD-MCNC: 13.8 G/DL (ref 12–17)
LDLC SERPL CALC-MCNC: 160 MG/DL (ref 0–100)
MCH RBC QN AUTO: 28.9 PG (ref 26.8–34.3)
MCHC RBC AUTO-ENTMCNC: 32.2 G/DL (ref 31.4–37.4)
MCV RBC AUTO: 90 FL (ref 82–98)
NONHDLC SERPL-MCNC: 174 MG/DL
PLATELET # BLD AUTO: 201 THOUSANDS/UL (ref 149–390)
PMV BLD AUTO: 11.9 FL (ref 8.9–12.7)
POTASSIUM SERPL-SCNC: 4.5 MMOL/L (ref 3.5–5.3)
PROT SERPL-MCNC: 6.2 G/DL (ref 6.4–8.4)
RBC # BLD AUTO: 4.77 MILLION/UL (ref 3.88–5.62)
SODIUM SERPL-SCNC: 143 MMOL/L (ref 135–147)
TRIGL SERPL-MCNC: 72 MG/DL
WBC # BLD AUTO: 5.33 THOUSAND/UL (ref 4.31–10.16)

## 2024-05-29 PROCEDURE — 36415 COLL VENOUS BLD VENIPUNCTURE: CPT | Performed by: FAMILY MEDICINE

## 2024-05-29 PROCEDURE — 80061 LIPID PANEL: CPT | Performed by: FAMILY MEDICINE

## 2024-05-29 PROCEDURE — 85027 COMPLETE CBC AUTOMATED: CPT | Performed by: FAMILY MEDICINE

## 2024-05-29 PROCEDURE — 80053 COMPREHEN METABOLIC PANEL: CPT | Performed by: FAMILY MEDICINE

## 2025-01-03 ENCOUNTER — OFFICE VISIT (OUTPATIENT)
Dept: RHEUMATOLOGY | Facility: CLINIC | Age: 75
End: 2025-01-03

## 2025-01-03 VITALS
SYSTOLIC BLOOD PRESSURE: 128 MMHG | DIASTOLIC BLOOD PRESSURE: 74 MMHG | WEIGHT: 133 LBS | HEIGHT: 66 IN | HEART RATE: 62 BPM | BODY MASS INDEX: 21.38 KG/M2 | OXYGEN SATURATION: 99 %

## 2025-01-03 DIAGNOSIS — M81.0 AGE-RELATED OSTEOPOROSIS WITHOUT CURRENT PATHOLOGICAL FRACTURE: ICD-10-CM

## 2025-01-03 DIAGNOSIS — Z79.52 CURRENT CHRONIC USE OF SYSTEMIC STEROIDS: ICD-10-CM

## 2025-01-03 DIAGNOSIS — M35.3 PMR (POLYMYALGIA RHEUMATICA) (HCC): Primary | ICD-10-CM

## 2025-01-03 RX ORDER — KETOROLAC TROMETHAMINE 5 MG/ML
SOLUTION OPHTHALMIC
COMMUNITY

## 2025-01-03 RX ORDER — PREDNISONE 2.5 MG/1
2.5 TABLET ORAL DAILY
Qty: 30 TABLET | Refills: 6 | Status: SHIPPED | OUTPATIENT
Start: 2025-01-03

## 2025-01-03 NOTE — ASSESSMENT & PLAN NOTE
Reviewed records per Dr Romero    PMR: baseline dose 2.5 - 5 mg daily since 2016    No GCA symptoms    Prior w/u RF, CCP normal    Orders:    predniSONE 2.5 mg tablet; Take 1 tablet (2.5 mg total) by mouth daily

## 2025-01-03 NOTE — ASSESSMENT & PLAN NOTE
Bone health: DXA 8/24 T score, h/o chronic steroid therapy. We discussed adding bisphosphonate vs prolia (declines for now). Continue calcium/D supplements    Orders:    predniSONE 2.5 mg tablet; Take 1 tablet (2.5 mg total) by mouth daily

## 2025-01-03 NOTE — PROGRESS NOTES
Name: Tobias Irwin      : 1950      MRN: [id number]  Encounter Provider: Unruly Laura MD  Encounter Date: 1/3/2025   Encounter department: [facility]HEUMATOLOGY [facility]  :  Assessment & Plan  PMR (polymyalgia rheumatica) ([facility])    Reviewed records per Dr Romero    PMR: baseline dose 2.5 - 5 mg daily since 2016    No GCA symptoms    Prior w/u RF, CCP normal    Orders:    predniSONE 2.5 mg tablet; Take 1 tablet (2.5 mg total) by mouth daily    Age-related osteoporosis without current pathological fracture    Bone health:  T score, h/o chronic steroid therapy. We discussed adding bisphosphonate vs prolia (declines for now). Continue calcium/D supplements    Orders:    predniSONE 2.5 mg tablet; Take 1 tablet (2.5 mg total) by mouth daily    Current chronic use of systemic steroids    Bone health:  T score, h/o chronic steroid therapy. We discussed adding bisphosphonate vs prolia (declines for now). Continue calcium/D supplements    Orders:    predniSONE 2.5 mg tablet; Take 1 tablet (2.5 mg total) by mouth daily        History of Present Illness   HPI  Tobias Irwin is a 74 y.o. male who presents for evaluation polymyalgia rheumatica.       This is a Rheumatology Consult seen at the request of Dr. Chandler      HPI: Tobias Irwin is a 73 y/o male who presents for further evaluation PMR. He has past medical history PMR, osteoporosis, anxiety, BPH    Patient transferring care per outside rheumatologist Dr Romero    Onset of symptoms back in  when he developed pain and stiffness b/l shoulders and hips    Denies headaches    He was started on oral steroids with excellent response    Has had flare ups at  doses less than 2.5 - 5 mg daily and that has been his baseline dose for years    Bone health:  T score, h/o chronic steroid therapy. We discussed adding bisphosphonate vs prolia (declines for now). Continue calcium/D  supplements    --------------------------------------------------------------------------------------------------------        ROS:      - for: Fevers, Chills or sweats.  No HAs or scalp tenderness.  No jaw claudication.  No acute visual or eye changes.  No dry eyes.  No auditory complaints.  No oral lesions or ulcers.  No dry mouth.  No sore throat or cough.  No chest pains or palpitations.  No shortness of breath, dyspnea on exertion or wheezing.  No hemotpysis.  No abdominal pain, GERD symptoms, diarrhea or constipation.  No urinary complaints.  No numbness, tingling or weakness.  No rashes.    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    BPH with obstruction/lower urinary tract symptoms     Closed compression fracture of sacrum ([facility])     Contusion of lung     Current chronic use of steroids     off 9/14    Elevated PSA     Generalized anxiety disorder 09/03/2019    Hip pain 09/15/2018    History of pancreatitis 11/04/2021    Incomplete bladder emptying     Inguinal hernia     Last Assessed: 11/7/2014    Kidney stone     Nephrolithiasis     Nocturia     Pneumothorax, right     Last Assessed: 12/30/2016    Polymyalgia rheumatica (HCC) 2013    Rheumatic fever     on Pcn x yrs    Rib fracture     Right flank pain     Subdural hematoma ([facility]) 02/2010    Vocal cord paralysis     Weak urinary stream            Past Surgical History    Past Surgical History:   Procedure Laterality Date    COLONOSCOPY  2010    Normal.  Biopsy showed collagenous colitis by     ENDOTRACHEAL INTUBATION EMERGENT      FLEXIBLE SIGMOIDOSCOPY  2012    Normal biopsy showed normal tissue by Dr. Boyd     "INGUINAL HERNIA REPAIR Right     THROAT SURGERY      throat Larynx Vocal Cord Mobility - twice    TRACHEOSTOMY      Emergency           Family History    Family History   Problem Relation Age of Onset    Diabetes Mother     Lung cancer Mother     Nephrolithiasis Father     Stomach cancer Maternal Grandmother     Cancer Family     Diabetes Family     Urolithiasis Family             Social History    Social History     Tobacco Use    Smoking status: Never    Smokeless tobacco: Never   Vaping Use    Vaping status: Never Used   Substance Use Topics    Alcohol use: Yes     Comment: Drinks socially; \"a few beers a week\".    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[employer])    Allergies    Allergies   Allergen Reactions    Influenza Vaccines Dizziness    Zoloft [Sertraline]          Medications    Current Outpatient Medications   Medication Instructions    Ascorbic Acid (VITAMIN C) 500 MG CAPS 1 tablet, Daily    Cholecalciferol (VITAMIN D-3) 1000 units CAPS Daily    ketorolac (ACULAR) 0.5 % ophthalmic solution INSTILL 1 DROP INTO OPERATIVE EYE MORNING OF LASER, THEN 4 TIMES DAILY X4 DAYS    predniSONE 5 mg tablet Currently 2.5 every other day for PMR; or dose as directed by rheumatology    vitamin E, tocopherol, 400 units capsule Daily        ________________________________________________________________________          Results Review    Component      Latest Ref Rng 9/22/2020 10/29/2021   CYCLIC CITRULLINATED PEPTIDE ANTIBODY      0 - 19 units 3     RHEUMATOID FACTOR      Negative  Negative     C-REACTIVE PROTEIN      <3.0 mg/L  <3.0    Sed Rate      0 - 19 mm/hour  2        Review of Systems       Objective   /74   Pulse 62   Ht 5' 6\" (1.676 m)   Wt 60.3 kg (133 lb)   SpO2 99%   BMI 21.47 kg/m²      Physical Exam    GEN: AAO, No apparent distress.  Patient is well developed.  HEENT:  Pupils are equal, round and reactive.  Sclera are clear.  Fundoscopic exam is normal.  External ears are without lesions.  " Oral pharynx is clear of ulcers or other lesions.  MMM.   NECK:  Supple.  There is no adenopathy appreciable in anterior or posterior cervical chains or supraclavicularly.  JVP is normal.    HEART: Regular rate and rhythm.  There is no appreciable murmur, gallop or rub.  LUNGS: Clear to auscultation.  ABD:  Soft, without tenderness, rebound or guarding.  No appreciable organomegally.  NEURO: Speech and cognition are normal.  Strength is 5/5 throughout.  Tone is normal.  DTRs are 2/4 at the knees, ankles and elbows.  Gait is normal.  SKIN: There are no rashes or lesions    MUSCULOSKELETAL:   No synovitis noted          I have spent a total time of 64 minutes in caring for this patient on the day of the visit/encounter including Diagnostic results, Risk factor reductions, Impressions, Counseling / Coordination of care, Documenting in the medical record, Reviewing / ordering tests, medicine, procedures  , Obtaining or reviewing history  , and Communicating with other healthcare professionals .

## 2025-04-30 ENCOUNTER — TELEPHONE (OUTPATIENT)
Age: 75
End: 2025-04-30

## 2025-04-30 NOTE — TELEPHONE ENCOUNTER
Pt called stating he had missed call from [phone] no documentation found I see he was last seen 05/24/24 and does not have scheduled annual follow up I asked if he would like to schedule de declined.

## 2025-05-23 NOTE — ASSESSMENT & PLAN NOTE
PSA not completed yet.  Will follow-up once resulted  His biopsies of all been benign.    Multiparametric MRI did not reveal any lesions and PI-RADS 2.  PSA: Will obtain within this week  Lab Results   Component Value Date    PSA 8.34 (H) 03/05/2024    PSA 6.2 (H) 02/17/2023    PSA 5.9 (H) 12/28/2022

## 2025-05-23 NOTE — PROGRESS NOTES
Name: Tobias Irwin      : 1950      MRN: [id number]  Encounter Provider: ANITRA Lazaro  Encounter Date: 2025   Encounter department: [facility] UROLOGY [place]  :  Assessment & Plan  BPH with urinary obstruction  Patient reports no urological concerns.  Patient is happy with voiding pattern.  Urinalysis has not been completed yet will follow-up once resulted  Will follow-up in 1 year       Elevated prostate specific antigen (PSA)  PSA not completed yet.  Will follow-up once resulted  His biopsies of all been benign.    Multiparametric MRI did not reveal any lesions and PI-RADS 2.  PSA: Will obtain within this week  Lab Results   Component Value Date    PSA 8.34 (H) 2024    PSA 6.2 (H) 2023    PSA 5.9 (H) 2022                History of Present Illness   Tobias Irwin is a 74 y.o. male who presents for 1 year follow-up.  Patient managed by Dr. Ruano.  Patient has history of elevated PSA and had prostate biopsy 2024 revealed 53 g prostate and PI-RADS 2.  Transrectal ultrasound biopsy was done May 10  Patient happy with urinary pattern.  Denies hematuria, dysuria, urgency, frequency  Review of Systems   Constitutional:  Negative for chills and fever.   HENT:  Negative for ear pain and sore throat.    Eyes:  Negative for pain and visual disturbance.   Respiratory:  Negative for cough and shortness of breath.    Cardiovascular:  Negative for chest pain and palpitations.   Gastrointestinal:  Negative for abdominal pain and vomiting.   Genitourinary:  Negative for difficulty urinating, dysuria, hematuria and urgency.   Musculoskeletal:  Negative for arthralgias and back pain.   Skin:  Negative for color change and rash.   Neurological:  Negative for seizures and syncope.   All other systems reviewed and are negative.         Objective   There were no vitals taken for this visit.    Physical Exam  Vitals reviewed.   Constitutional:       Appearance: Normal  appearance.     Cardiovascular:      Rate and Rhythm: Normal rate.   Pulmonary:      Effort: Pulmonary effort is normal.     Skin:     General: Skin is warm.     Neurological:      General: No focal deficit present.      Mental Status: He is oriented to person, place, and time.     Psychiatric:         Mood and Affect: Mood normal.         Behavior: Behavior normal.           Results   Lab Results   Component Value Date    PSA 8.34 (H) 03/05/2024    PSA 6.2 (H) 02/17/2023    PSA 5.9 (H) 12/28/2022     Lab Results   Component Value Date    GLUCOSE 114 10/13/2014    CALCIUM 9.1 05/29/2024     10/13/2014    K 4.5 05/29/2024    CO2 30 05/29/2024     05/29/2024    BUN 18 05/29/2024    CREATININE 0.80 05/29/2024     Lab Results   Component Value Date    WBC 5.33 05/29/2024    HGB 13.8 05/29/2024    HCT 42.8 05/29/2024    MCV 90 05/29/2024     05/29/2024       Office Urine Dip  No results found for this or any previous visit (from the past hour).

## 2025-05-28 ENCOUNTER — OFFICE VISIT (OUTPATIENT)
Dept: UROLOGY | Facility: MEDICAL CENTER | Age: 75
End: 2025-05-28

## 2025-05-28 VITALS
SYSTOLIC BLOOD PRESSURE: 100 MMHG | WEIGHT: 133 LBS | HEART RATE: 67 BPM | BODY MASS INDEX: 21.38 KG/M2 | HEIGHT: 66 IN | DIASTOLIC BLOOD PRESSURE: 60 MMHG | OXYGEN SATURATION: 97 %

## 2025-05-28 DIAGNOSIS — N13.8 BPH WITH URINARY OBSTRUCTION: ICD-10-CM

## 2025-05-28 DIAGNOSIS — R97.20 ELEVATED PROSTATE SPECIFIC ANTIGEN (PSA): ICD-10-CM

## 2025-05-28 DIAGNOSIS — N40.1 BPH WITH URINARY OBSTRUCTION: ICD-10-CM
